# Patient Record
Sex: FEMALE | Race: WHITE | Employment: FULL TIME | URBAN - METROPOLITAN AREA
[De-identification: names, ages, dates, MRNs, and addresses within clinical notes are randomized per-mention and may not be internally consistent; named-entity substitution may affect disease eponyms.]

---

## 2018-07-12 ENCOUNTER — OFFICE VISIT (OUTPATIENT)
Dept: BARIATRICS/WEIGHT MGMT | Age: 38
End: 2018-07-12
Payer: COMMERCIAL

## 2018-07-12 VITALS
WEIGHT: 280 LBS | SYSTOLIC BLOOD PRESSURE: 118 MMHG | BODY MASS INDEX: 46.65 KG/M2 | HEART RATE: 72 BPM | DIASTOLIC BLOOD PRESSURE: 72 MMHG | HEIGHT: 65 IN | RESPIRATION RATE: 20 BRPM

## 2018-07-12 DIAGNOSIS — K21.9 GASTROESOPHAGEAL REFLUX DISEASE WITHOUT ESOPHAGITIS: Primary | ICD-10-CM

## 2018-07-12 DIAGNOSIS — E66.01 MORBID OBESITY (HCC): ICD-10-CM

## 2018-07-12 DIAGNOSIS — R06.83 SNORING: ICD-10-CM

## 2018-07-12 PROCEDURE — 99204 OFFICE O/P NEW MOD 45 MIN: CPT | Performed by: SURGERY

## 2018-07-15 NOTE — PROGRESS NOTES
today.      [] HIV Risk Factors (i.e.) intravenous drug abuser; at risk sexual behavior; received blood products    [] TB Risk Factors (i.e.) Medically underserved, institutional care, foreign born, endemic area; exposure to active case    [] Hepatitis B&C Risk Factors (i.e.) Received blood transfusion prior to 1992; recreational drug use; high risk sexual behaviors; tattoos or body piercings; contact with blood or needle sticks in the workplace    Comprehension    Ability to grasp concepts and respond to questions:   [x] High   [] Medium   [] Low    Motivation    [x] Asks Questions; eager to learn   [] Needs education   [] Extreme anxiety    [] uncooperative   [] Denies need for education    English Speaking Ability    [x] Speaks English well   [x] Reads English well   [x] Understands spoken Letty Anaya    [] Understands written English   [] No need for interpretive support      [] Might benefit from interpretive support   []  required for all services     REVIEW OF SYSTEMS:     Do you feel sleepy during the day? [x] Yes     [] No  Do you get short of breath when walking up two flights of stairs? [x] Yes     [] No  Do you get chest pains when walking up two flights of stairs? [] Yes     [x] No  Do you suffer from back pain? [x] Yes     [] No  Do you suffer from knee pain? [x] Yes     [] No    Do you or have you had any of the following?   Cardiovascular YES NO Respiratory YES NO   High Blood Pressure   []   [x] COPD   []   [x]   Heart Attack   []   [x] TB/Positive skin Test   []   [x]   Congestive Heart Failure   []   [x] Obstructive Sleep Apnea   []   [x]   Coronary Artery Disease   []   [x] Asthma   []   [x]   Circulation Problems   []   [x]      Activity Intolerance   [x]   [] Gastrointestinal YES NO   Peripheral Vascular Disease   []   [x] Gastric Problems   []   [x]        Colorectal problems   []   [x]   Hematological YES NO Ulcer disease   []   [x]   Bleeding Tendencies   [] [x] Liver disease   []   [x]   Blood Transfusion last 30d   []   [x] Gallstones   []   [x]   Anemia   []   [x] Refulx or Heartburn   [x]   []   Blood Clots   []   [x]      High Cholesterol   []   [x] Muscoloskeletal YES NO   High Triglycerides   []   [x] Joint Limitations   []   [x]      Muscle Weakness   []   [x]   Eyes, Ears, Nose, Throat YES NO Multiple Sclerosis   []   [x]   Cataracts   []   [x] Arthritis   []   [x]   Glasses   [x]   []      Blurred Vision   []   [x] Cancer   []   [x]   Hearing Aids   []   [x] Type:     Ringing in Ears   []   [x]      Difficulty Swallowing   []   [x] Encodrine YES NO      Diabetes   []   [x]   Neurological YES NO Thyroid   []   [x]   Stroke   []   [x]      Seizure   []   [x] Psychiatric Disorder YES NO   Dizziness/Blackouts/Fainting   []   [x] Depression   [x]   []   Memory Impairement   []   [x] Bipolar   []   [x]   Parkinson's   []   [x] Anxiety disorder   []   [x]           Genitourinary/Gyn YES NO Skin Intact   [x]   []   Urinary Infection   []   [x]      Stones   []   [x] Sleep   YES NO   Kidney Disease   []   [x] Excessive daytime sleepiness   [x]   []   Incontinent   []   [x] Snoring   [x]   []   Irregular menstrual cycles   [x]   [] Unrefreshed sleep   [x]   []   Possibly Pregnant? []     [x] Other:      Date of LMP:   Preferred location:       PREVIOUS ANESTHESIA  Has any family member had a problem with anesthesia in the past?  [x] No   [] Yes  If yes, describe:  Have you had a problem with anesthesia in the past?                 [x] No   [] Yes  If yes, describe:  Do you have any difficulty moving your head side-to-side? [x] No   [] Yes  Do you have difficulty opening or closing your jaw? [x] No   [] Yes       PRESENT ILLNESS:     Weight Parameters  Weight 280 lb (127 kg)   Height 5' 5\" (1.651 m)   BMI Body mass index is 46.59 kg/m².    IBW     EBW               FALLS ASSESSMENT    [x] LOW RISK FOR FALLS    [] MODERATE RISK FOR FALLS    [] Difficulty walking/selfcare    [] Falls in the past 2 months    [] Suspicion of Clinician    [] Other:      SMOKING CESSATION     [x] Not needed     [] Instructed to stop smoking    [] Pamphlet community resources given     VTE SCREEN    [] Family hx DVT/PE  /   [] Personal hx of DVT/PE    [x] Denies any family or personal hx of DVT/PE    Physician Review    [x] Past medical, family, & social history reviewed and discussed with patient. Review of surgery and post-surgical changes (by surgeon for surgical patients only)    [x] Lifelong diet expectations reviewed with patient    [x] Need for lifelong vitamin supplementation reviewed with patient    PHYSICAL EXAMINATION:      /72 (Site: Left Arm, Position: Sitting, Cuff Size: Large Adult)   Pulse 72   Resp 20   Ht 5' 5\" (1.651 m)   Wt 280 lb (127 kg)   BMI 46.59 kg/m²     Constitutional:  Vital signs are normal. The patient appears well-developed   HEENT:      Head: Normocephalic. Atraumatic     Eyes: pupils are equal and reactive. No scleral icterus is present. Neck: No mass and no thyromegaly present. Cardiovascular: Normal rate, regular rhythm, S1 normal and S2 normal.  Bilateral pulses present. Pulmonary/Chest: Effort normal and breath sounds normal. No retractions. Abdominal: Soft. Normal appearance. There is no organomegaly. No tenderness. There is no rigidity, no rebound, no guarding and no Stein's sign. Musculoskeletal:      Right lower leg: Normal. No tenderness and no edema. Left lower leg: Normal. No tenderness and no edema. Lymphadenopathy:     No cervical adenopathy, No Exrtemity Adenopathy. Neurological: The patient is alert and oriented. Moving all four extremities equally, sensation grossly intact bilateral.  Skin: Skin is warm, dry and intact. Psychiatric: The patient has a normal mood and affect.  Speech is normal and behavior is normal. Judgment and thought content normal. Cognition and memory are normal. RECOMMENDATIONS:     We spent a great deal of time discussing the risks and benefits of Heaven-en-Y Gastric Bypass, including but not limited to injury to intra-abdominal organs, breakdown of the gastric staple line, the need for re-operative therapy,  prolonged hospitalization,  mechanical ventilation,  and death. We discussed the possibility of bleeding, the need for blood transfusions, blood clots, hospital-acquired and intra-abdominal infection, anastomotic stricture, and worsening GERD. And we discussed the need for post-operative visit compliance, behavior modifications and diet changes, protein and vitamin supplementation, as well as routine scheduled and dedicated exercise. I instructed the patient to utilize the exercise log that will be given to them at their fist dietician appointment. We discussed the potential weight loss benefit of approximately 60-70% of her excess body weight at 12-18 months post-op, as well as the possibility of insufficient weight loss or weight gain after 2 years post-operative time. PLAN:       Diagnosis Orders   1. Gastroesophageal reflux disease without esophagitis  PTH, Intact    Magnesium    Lipid Panel    Iron and TIBC    Ferritin    Comprehensive Metabolic Panel    CBC Auto Differential    Hemoglobin A1C    Zinc    Vitamin B12 & Folate    Vitamin D 25 Hydroxy    Vitamin B1    Vitamin A    T4, Free    TSH without Reflex    Urine Drug Screen    Ambulatory referral to Psychiatry    Nicotine, Blood   2. Snoring  Comprehensive Metabolic Panel    CBC Auto Differential    Apnea Link Screening (THIS IS NOT A HOME SLEEP STUDY)   3.  Morbid obesity (HCC)  PTH, Intact    Magnesium    Lipid Panel    Iron and TIBC    Ferritin    Comprehensive Metabolic Panel    CBC Auto Differential    Hemoglobin A1C    Zinc    Vitamin B12 & Folate    Vitamin D 25 Hydroxy    Vitamin B1    Vitamin A    T4, Free    TSH without Reflex    Urine Drug Screen    Ambulatory referral to Psychiatry Nicotine, Blood    Apnea Link Screening (THIS IS NOT A HOME SLEEP STUDY)          Initial Testing     Primary Procedure: Heaven-en-Y Gastric Bypass     Labwork: Initial Pre-surgical Lab Tests (CMP, TSH, Fasting Lipid Profile, Mg, Zinc, Vit B1 (whole blood), Vit B12, 25-OH Vit D, Fe,  Ferritin,  Folate), Urine drug and alcohol screen  and Negative serum nicotine prior to submission for pre-auth    Endoscopic Studies: Upper GI Endoscopy for GERD which has been untreated. Psychological Assessment: Psychological Evaluation and Clearance    Nutrition Assessment: Bariatric Nutrition Assessment and Clearance    Pulmonary Evaluation: Obstructive Sleep Apnea Evaluation, Copy of Previous Sleep Study and CPAP Settings    Other  Consultations: PCP clearance    Physician Supervised Diet and Exercise required by the patients insurance company: 6 months.       Surgical Diet requirement:  2 weeks    Final Testing  Screening Chest Xray  and EKG within 6 months of date of surgery    Labwork:  Final Lab Tests  within 3 months of date of surgery (CBC, PT/PTT, BMP)    Electronically signed by Justice Nevarez DO on 7/15/2018 at 6:43 PM

## 2018-07-26 ENCOUNTER — NURSE ONLY (OUTPATIENT)
Dept: BARIATRICS/WEIGHT MGMT | Age: 38
End: 2018-07-26

## 2018-07-26 NOTE — PROGRESS NOTES
has participated in weight loss medications. Nutrition History  Patient is not lactose intolerant. Have you ever had problems tolerating a multivitamin or mineral supplement? Yes, when she was pregnant   Have you ever been diagnosed with a vitamin or mineral deficiency? no   Patient does not have food allergies. Patient does not have Worship/cultural food concerns. Patient dines out to a sit down restaurant 3-4 times per month. Patient dines out to a fast food restaurant 4 times per year. Patient does not have grazing. Patient occasionally have night eating. Patient does have a history of emotional eating. Patient occasionally  have a history of  eating out of boredom. Patient does not binge on foods  It does not take an unusually large amount of food for the patient to feel comfortably full. Most days the patient eats until female  is comfortable. Drinks throughout the day: regular pop (on occasion), coffee (1 cup), water, unsweetened tea (diet snapple / hot tea) and celi aid   Do you have regular meal times no     24 hour recall/food frequency: has been scanned into chart unless completed below. Surgery  Patient's greatest concern about having surgery is: dying, looking pale and sick. How will you know when you've been successful? Wants to be able to shop for clothes freely  Patient has attended an information session where the long term changes of MBS were presented. Rating on a scale of 0-10 with 0 being none and 10 being the highest you have ever felt rate each of the three areas:    ability How confident are you that you can change now? 10   willingness How important is it for you to change at this time? 10   Readiness How ready are you to change at this time? 10             Assessment:  Nutritional Needs:  Men: 1500kcal daily minimum     Women 1200kcal daily minimum.      60-80gm of protein daily  PES Statement:  Obesity related to a complex combination of decreased energy needs, disordered eating patterns, physical inactivity, and increased psychological/life stress as evidenced by BMI Body mass index is 46.26 kg/m². and inability to maintain a significant amount of weight loss through conventional weight loss interventions. Problem list:  [facility]  Medical Nutrition Therapy   Metabolic and Bariatric Surgery              []     Sugary non carbonated  beverages        []     Dining out        []     No activity plan        []     Supportive therapy indicated         []    Diet   Carbonated beverages       []      Sugary carbonated beverages        [x]    Erratic meal times        []   Excessive sugar intake        []       alcohol        []        smoking      []  Not attending appt. On time and as scheduled      [x]  Caffeine intake (coffee or tea)  Coffee and tea   Initial assessment date 7-12-18    Wt 280lb       Goals    All goals were planned with and agreed on by the patient. Goal Card  Name                                                                                                                           Humphrey Hamman Schober  1. I will read the entire patient educational binder provided to me prior to my second appointment at [facility]. 2. I will make my psychological evaluation appointment prior to my second appointment at [facility]. 3. I will bring this tracking tool to every appointment with a health care provider at [facility]. 4. I will eliminate all nicotine, tobacco and e-cigarettes prior to surgery. 5. I will limit alcoholic beverages prior to surgery to 1 mixed drink or glass of wine (4-6oz). 6. I will limit dining out including fast food to 3 times a week prior to surgery. 7. I will eliminate sugary beverages prior to surgery. 8. I will eliminate carbonated beverages prior to surgery. 9. I will eliminate drinking with a straw prior to surgery. 10. I will limit caffeinated beverages prior to my surgery to [address] daily. 11. I will eliminate cold cereals prepared with milk prior to surgery. 12. I will do a 5 minute reflection    13. I will food journal daily (If I dont find this helpful after one month I may discontinue the behavior with the understanding that it will be important to my health to do this for the first three months following surgery). 14. I will exercise daily for 10-30  minutes daily 24 days per month or more as tolerated. I will keep a daily log of my physical activity each day. 15. I will determine my optimal supplement plan. 16. I will purchase my supplements. 17. I will begin taking supplements according to my plan. 18. I will eat 8-11 servings of lean protein daily following the guidelines for meal planning in the patient educational binder provided to me. 19. I will eat every 3-5 hours for all meals for one day each week on a day of my choosing. 20. I will maintain my fluid intake of at least 64oz daily. 21. I will follow the 15/30/15 rule at least one day each week for all meals I am allowed to have a small 4oz beverage as needed at meal times. ( 15-30-15-do not drink 15minutes prior to a meal, take 30minutes to eat your meal and dont drink 15 minutes after your meal)    22. I will eat around my plate at all meals at least one day each week on a day of my choosing. Please write down the greatest motivator that brought you to us today  I want to manage my weight because                                appt # na oa What is your next step?   G# 1 2 3 4 5 6 7 8 9   0    1            0    2            0    3             x   4                5             x   6                7                8                9                10             x   11                12                13            0    14                15                16                17 18                19                20                21                22                23                24                 25                    Do you understand your goals? y    Do you have the information you need to achieve your goals? y    Do you have any questions  right now? n        Plan    Exercise for Health 15 easy exercises to do at home with 6 activity logs were provided to the patient with verbal and written instructions on how to carry this out. Goal number 14 was provided to the patient on this visit please see above. Will follow up each month and provide support as patient begins to add physical activity to life style. Monitor and review goals adjust as needed. Follow up monthly supervised diet and exercise.        Ricardo Rivera

## 2018-07-27 VITALS — WEIGHT: 278 LBS | BODY MASS INDEX: 46.26 KG/M2

## 2018-08-20 ENCOUNTER — OFFICE VISIT (OUTPATIENT)
Dept: BARIATRICS/WEIGHT MGMT | Age: 38
End: 2018-08-20
Payer: COMMERCIAL

## 2018-08-20 VITALS
HEIGHT: 65 IN | RESPIRATION RATE: 22 BRPM | WEIGHT: 281 LBS | HEART RATE: 68 BPM | SYSTOLIC BLOOD PRESSURE: 128 MMHG | BODY MASS INDEX: 46.82 KG/M2 | DIASTOLIC BLOOD PRESSURE: 80 MMHG

## 2018-08-20 DIAGNOSIS — G89.29 CHRONIC BACK PAIN, UNSPECIFIED BACK LOCATION, UNSPECIFIED BACK PAIN LATERALITY: ICD-10-CM

## 2018-08-20 DIAGNOSIS — Z86.59 HISTORY OF DEPRESSION: ICD-10-CM

## 2018-08-20 DIAGNOSIS — K21.9 GASTROESOPHAGEAL REFLUX DISEASE WITHOUT ESOPHAGITIS: ICD-10-CM

## 2018-08-20 DIAGNOSIS — M54.9 CHRONIC BACK PAIN, UNSPECIFIED BACK LOCATION, UNSPECIFIED BACK PAIN LATERALITY: ICD-10-CM

## 2018-08-20 DIAGNOSIS — E66.01 OBESITY, CLASS III, BMI 40-49.9 (MORBID OBESITY) (HCC): ICD-10-CM

## 2018-08-20 PROBLEM — E66.813 OBESITY, CLASS III, BMI 40-49.9 (MORBID OBESITY) (HCC): Status: ACTIVE | Noted: 2018-08-20

## 2018-08-20 PROCEDURE — 99213 OFFICE O/P EST LOW 20 MIN: CPT | Performed by: NURSE PRACTITIONER

## 2018-08-20 NOTE — PROGRESS NOTES
Medical Weight Management Progress Note    Subjective     Patient being seen for medically supervised weight loss for the chronic conditions of GERD, Chronic Back Pain, Hx Depression. She is working on the behavior changes discussed at the initial appointment. Patient continues on diet plan. Physical activity includes yoga, biking, and swimming. Weight gain of 1 lb since last visit. Needs to schedule/complete medical testing. No current issues. Working toward bariatric surgery:    [] Sleeve Gastrectomy                                                           [x] Heaven-en-Y Gastric Bypass    Allergies:  No Known Allergies    Past Medical History:     Past Medical History:   Diagnosis Date    Back pain     Depression     Migraines    . Past Surgical History:  Past Surgical History:   Procedure Laterality Date    PARTIAL HYSTERECTOMY         Family History:  Family History   Problem Relation Age of Onset    Arthritis Mother     Depression Mother     Heart Disease Father        Social History:  Social History     Social History    Marital status:      Spouse name: N/A    Number of children: N/A    Years of education: N/A     Occupational History    Not on file. Social History Main Topics    Smoking status: Never Smoker    Smokeless tobacco: Never Used    Alcohol use Yes    Drug use: Yes     Types: Marijuana      Comment: use marijuana sometimes     Sexual activity: Not on file     Other Topics Concern    Not on file     Social History Narrative    No narrative on file       Current Medications:  No current outpatient prescriptions on file. No current facility-administered medications for this visit. Vital Signs:  /80 (Site: Right Arm, Position: Sitting, Cuff Size: Large Adult)   Pulse 68   Resp 22   Ht 5' 5\" (1.651 m)   Wt 281 lb (127.5 kg)   BMI 46.76 kg/m²     BMI/Height/Weight:  Body mass index is 46.76 kg/m².       Review of Systems - A review of

## 2018-08-20 NOTE — PROGRESS NOTES
Medical Nutrition Therapy   Metabolic and Bariatric Surgery         Supervised diet and exercise preparation  Visit 1 out of 6  Pt reports:      Pt currently following structured meal plan 8pro/3veg/2fr/6 starch/3fat from education binder diet for weight management. Reviewed with pt. Vitals: Wt Readings from Last 3 Encounters:   08/20/18 281 lb (127.5 kg)   07/27/18 278 lb (126.1 kg)   07/12/18 280 lb (127 kg)     gained 1 lbs over one month      Nutrition Assessment:   PES: Knowledge deficit related to healthy behaviors that support weight management post weight loss surgery as evidenced by Body mass index is 46.76 kg/m². Nutrition Assessment of Goal Attainment:  TREATMENT GOALS:    1. Pt  Completed 4 out of 4 goals. 2.TREATMENT GOALS FOR UPCOMING WEEK: continue all previous goals and add: # 23    All goals were planned with and agreed on by the patient. Goal Card  Name                                                                                                                           Arlen Benne Schober  1. I will read the entire patient educational binder provided to me prior to my second appointment at [facility]. 2. I will make my psychological evaluation appointment prior to my second appointment at [facility]. 3. I will bring this tracking tool to every appointment with a health care provider at [facility]. 4. I will eliminate all nicotine, tobacco and e-cigarettes prior to surgery. 5. I will limit alcoholic beverages prior to surgery to 1 mixed drink or glass of wine (4-6oz). 6. I will limit dining out including fast food to 3 times a week prior to surgery. 7. I will eliminate sugary beverages prior to surgery. 8. I will eliminate carbonated beverages prior to surgery. 9. I will eliminate drinking with a straw prior to surgery. 10. I will limit caffeinated beverages prior to my surgery to [address] daily. 11. I will eliminate cold cereals prepared with milk prior to surgery. 12. I will do a 5 minute reflection    13. I will food journal daily (If I dont find this helpful after one month I may discontinue the behavior with the understanding that it will be important to my health to do this for the first three months following surgery). 14. I will exercise daily for 10-30  minutes daily 24 days per month or more as tolerated. I will keep a daily log of my physical activity each day. 15. I will determine my optimal supplement plan. 16. I will purchase my supplements. 17. I will begin taking supplements according to my plan. 18. I will eat 8-11 servings of lean protein daily following the guidelines for meal planning in the patient educational binder provided to me. 19. I will eat every 3-5 hours for all meals for one day each week on a day of my choosing. 20. I will maintain my fluid intake of at least 64oz daily. 21. I will follow the 15/30/15 rule at least one day each week for all meals I am allowed to have a small 4oz beverage as needed at meal times. ( 15-30-15-do not drink 15minutes prior to a meal, take 30minutes to eat your meal and dont drink 15 minutes after your meal)    22. I will eat around my plate at all meals at least one day each week on a day of my choosing. Please write down the greatest motivator that brought you to us today  I want to manage my weight because                                appt # na oa What is your next step?   G# 1 2 3 4 5 6 7 8 9   0  x  1 75           0  x  2 100           1    3 100            x   4             x   5             x   6             x   7             x   8                9             x   10             x   11                12                13            1    14 100               15                16                17                18                19                20

## 2018-08-27 DIAGNOSIS — R06.83 SNORING: ICD-10-CM

## 2018-08-27 DIAGNOSIS — G47.33 OSA (OBSTRUCTIVE SLEEP APNEA): Primary | ICD-10-CM

## 2018-08-27 DIAGNOSIS — E66.01 MORBID OBESITY (HCC): ICD-10-CM

## 2018-09-10 ENCOUNTER — OFFICE VISIT (OUTPATIENT)
Dept: BARIATRICS/WEIGHT MGMT | Age: 38
End: 2018-09-10
Payer: COMMERCIAL

## 2018-09-10 ENCOUNTER — NURSE ONLY (OUTPATIENT)
Dept: BARIATRICS/WEIGHT MGMT | Age: 38
End: 2018-09-10

## 2018-09-10 VITALS
BODY MASS INDEX: 47.15 KG/M2 | SYSTOLIC BLOOD PRESSURE: 120 MMHG | RESPIRATION RATE: 22 BRPM | DIASTOLIC BLOOD PRESSURE: 68 MMHG | WEIGHT: 283 LBS | HEART RATE: 74 BPM | HEIGHT: 65 IN

## 2018-09-10 DIAGNOSIS — G89.29 CHRONIC BACK PAIN, UNSPECIFIED BACK LOCATION, UNSPECIFIED BACK PAIN LATERALITY: ICD-10-CM

## 2018-09-10 DIAGNOSIS — E66.01 OBESITY, CLASS III, BMI 40-49.9 (MORBID OBESITY) (HCC): ICD-10-CM

## 2018-09-10 DIAGNOSIS — K21.9 GASTROESOPHAGEAL REFLUX DISEASE WITHOUT ESOPHAGITIS: Primary | ICD-10-CM

## 2018-09-10 DIAGNOSIS — Z86.59 HISTORY OF DEPRESSION: ICD-10-CM

## 2018-09-10 DIAGNOSIS — M54.9 CHRONIC BACK PAIN, UNSPECIFIED BACK LOCATION, UNSPECIFIED BACK PAIN LATERALITY: ICD-10-CM

## 2018-09-10 PROCEDURE — 99213 OFFICE O/P EST LOW 20 MIN: CPT | Performed by: NURSE PRACTITIONER

## 2018-09-10 RX ORDER — MELOXICAM 15 MG/1
TABLET ORAL
Status: ON HOLD | COMMUNITY
Start: 2012-05-31 | End: 2019-04-16 | Stop reason: HOSPADM

## 2018-09-10 RX ORDER — CYCLOBENZAPRINE HCL 10 MG
TABLET ORAL
COMMUNITY
Start: 2017-07-18 | End: 2019-04-22 | Stop reason: SDUPTHER

## 2018-09-10 RX ORDER — ALPRAZOLAM 0.5 MG/1
TABLET ORAL
COMMUNITY
Start: 2015-07-15 | End: 2020-06-05

## 2018-09-10 RX ORDER — TIZANIDINE HYDROCHLORIDE 4 MG/1
CAPSULE, GELATIN COATED ORAL
COMMUNITY
Start: 2015-12-09 | End: 2018-12-03

## 2018-09-10 NOTE — PROGRESS NOTES
12. I will do a 5 minute reflection    13. I will food journal daily (If I dont find this helpful after one month I may discontinue the behavior with the understanding that it will be important to my health to do this for the first three months following surgery). 14. I will exercise daily for 10-30  minutes daily 24 days per month or more as tolerated. I will keep a daily log of my physical activity each day. 15. I will determine my optimal supplement plan. 16. I will purchase my supplements. 17. I will begin taking supplements according to my plan. 18. I will eat 8-11 servings of lean protein daily following the guidelines for meal planning in the patient educational binder provided to me. 19. I will eat every 3-5 hours for all meals for one day each week on a day of my choosing. 20. I will maintain my fluid intake of at least 64oz daily. 21. I will follow the 15/30/15 rule at least one day each week for all meals I am allowed to have a small 4oz beverage as needed at meal times. ( 15-30-15-do not drink 15minutes prior to a meal, take 30minutes to eat your meal and dont drink 15 minutes after your meal)    22. I will eat around my plate at all meals at least one day each week on a day of my choosing. Please write down the greatest motivator that brought you to us today  I want to manage my weight because                                appt # na oa What is your next step?   G# 1 2 3 4 5 6 7 8 9   0  x  1 75 100          0  x  2 100           2    3 100 100           x   4             x   5             x   6             x   7             x   8                9             x   10             x   11                12                13            2    14 100 100          2    15                16                17                18                19                20

## 2018-09-10 NOTE — PROGRESS NOTES
medications for this visit. Vital Signs:  /68 (Site: Right Upper Arm, Position: Sitting, Cuff Size: Large Adult)   Pulse 74   Resp 22   Ht 5' 5\" (1.651 m)   Wt 283 lb (128.4 kg)   BMI 47.09 kg/m²     BMI/Height/Weight:  Body mass index is 47.09 kg/m². Review of Systems - A review of systems was performed. All was negative unless otherwise documented in HPI. Constitutional: Negative for fever, chills and diaphoresis. HENT: Negative for hearing loss and trouble swallowing. Eyes: Negative for photophobia and visual disturbance. Respiratory: Negative for cough, shortness of breath and wheezing. Cardiovascular: Negative for chest pain and palpitations. Gastrointestinal: Negative for nausea, vomiting, abdominal pain, diarrhea, constipation, blood in stool and abdominal distention. Endocrine: Negative for polydipsia, polyphagia and polyuria. Genitourinary: Negative for dysuria, frequency, hematuria and difficulty urinating. Musculoskeletal: Negative for myalgias, joint swelling. Skin: Negative for pallor and rash. Neurological: Negative for dizziness, tremors, light-headedness and headaches. Psychiatric/Behavioral: Negative for sleep disturbance and dysphoric mood. Objective:      Physical Exam   Vital signs reviewed. General: Well-developed and well-nourished. No acute distress. Skin: Warm, dry and intact. HEENT: Normocephalic. EOMs intact. Conjunctivae normal. Neck supple. Cardiovascular: Normal rate, regular rhythm. No murmur. Pulmonary/Chest: Normal effort. Lungs clear to auscultation. No rales, rhonchi or wheezing. Abdominal: Positive bowel sounds. Soft, nontender. Nondistended. Musculoskeletal: Movement x4. No edema. Neurological: Gait normal. Alert and oriented to person, place, and time. Psychiatric: Normal mood and affect. Speech and behavior normal. Judgment and thought content normal. Cognition and memory intact.      Assessment:       Diagnosis

## 2018-10-08 ENCOUNTER — OFFICE VISIT (OUTPATIENT)
Dept: BARIATRICS/WEIGHT MGMT | Age: 38
End: 2018-10-08
Payer: COMMERCIAL

## 2018-10-08 VITALS
WEIGHT: 285 LBS | RESPIRATION RATE: 20 BRPM | HEART RATE: 68 BPM | BODY MASS INDEX: 47.48 KG/M2 | SYSTOLIC BLOOD PRESSURE: 122 MMHG | DIASTOLIC BLOOD PRESSURE: 68 MMHG | HEIGHT: 65 IN

## 2018-10-08 DIAGNOSIS — K21.9 GASTROESOPHAGEAL REFLUX DISEASE WITHOUT ESOPHAGITIS: Primary | ICD-10-CM

## 2018-10-08 DIAGNOSIS — M54.9 CHRONIC BACK PAIN, UNSPECIFIED BACK LOCATION, UNSPECIFIED BACK PAIN LATERALITY: ICD-10-CM

## 2018-10-08 DIAGNOSIS — G89.29 CHRONIC BACK PAIN, UNSPECIFIED BACK LOCATION, UNSPECIFIED BACK PAIN LATERALITY: ICD-10-CM

## 2018-10-08 DIAGNOSIS — Z86.59 HISTORY OF DEPRESSION: ICD-10-CM

## 2018-10-08 DIAGNOSIS — E66.01 OBESITY, CLASS III, BMI 40-49.9 (MORBID OBESITY) (HCC): ICD-10-CM

## 2018-10-08 PROCEDURE — 99213 OFFICE O/P EST LOW 20 MIN: CPT | Performed by: NURSE PRACTITIONER

## 2018-10-15 ENCOUNTER — HOSPITAL ENCOUNTER (OUTPATIENT)
Dept: SLEEP CENTER | Facility: CLINIC | Age: 38
Discharge: HOME | End: 2018-10-17
Payer: COMMERCIAL

## 2018-10-15 DIAGNOSIS — G47.33 OSA (OBSTRUCTIVE SLEEP APNEA): ICD-10-CM

## 2018-10-15 PROCEDURE — 95810 POLYSOM 6/> YRS 4/> PARAM: CPT

## 2018-10-16 VITALS — RESPIRATION RATE: 20 BRPM | HEIGHT: 65 IN | HEART RATE: 75 BPM | BODY MASS INDEX: 46.65 KG/M2 | WEIGHT: 280 LBS

## 2018-10-16 ASSESSMENT — SLEEP AND FATIGUE QUESTIONNAIRES
HOW LIKELY ARE YOU TO NOD OFF OR FALL ASLEEP WHILE SITTING INACTIVE IN A PUBLIC PLACE: 0
HOW LIKELY ARE YOU TO NOD OFF OR FALL ASLEEP WHILE SITTING AND TALKING TO SOMEONE: 0
HOW LIKELY ARE YOU TO NOD OFF OR FALL ASLEEP WHEN YOU ARE A PASSENGER IN A CAR FOR AN HOUR WITHOUT A BREAK: 2
HOW LIKELY ARE YOU TO NOD OFF OR FALL ASLEEP WHILE WATCHING TV: 1
ESS TOTAL SCORE: 5
HOW LIKELY ARE YOU TO NOD OFF OR FALL ASLEEP IN A CAR, WHILE STOPPED FOR A FEW MINUTES IN TRAFFIC: 0
HOW LIKELY ARE YOU TO NOD OFF OR FALL ASLEEP WHILE SITTING QUIETLY AFTER LUNCH WITHOUT ALCOHOL: 0
NECK CIRCUMFERENCE (INCHES): 43
HOW LIKELY ARE YOU TO NOD OFF OR FALL ASLEEP WHILE SITTING AND READING: 1
HOW LIKELY ARE YOU TO NOD OFF OR FALL ASLEEP WHILE LYING DOWN TO REST IN THE AFTERNOON WHEN CIRCUMSTANCES PERMIT: 1

## 2018-10-23 LAB — STATUS: NORMAL

## 2018-11-12 ENCOUNTER — HOSPITAL ENCOUNTER (OUTPATIENT)
Dept: SLEEP CENTER | Facility: CLINIC | Age: 38
Discharge: HOME | End: 2018-11-14
Payer: COMMERCIAL

## 2018-11-12 DIAGNOSIS — G47.33 OSA (OBSTRUCTIVE SLEEP APNEA): ICD-10-CM

## 2018-11-12 PROCEDURE — 95811 POLYSOM 6/>YRS CPAP 4/> PARM: CPT

## 2018-11-13 VITALS
HEART RATE: 77 BPM | OXYGEN SATURATION: 97 % | RESPIRATION RATE: 11 BRPM | WEIGHT: 280 LBS | BODY MASS INDEX: 46.65 KG/M2 | HEIGHT: 65 IN

## 2018-11-15 LAB — STATUS: NORMAL

## 2018-11-16 ENCOUNTER — OFFICE VISIT (OUTPATIENT)
Dept: BARIATRICS/WEIGHT MGMT | Age: 38
End: 2018-11-16
Payer: COMMERCIAL

## 2018-11-16 VITALS
SYSTOLIC BLOOD PRESSURE: 122 MMHG | RESPIRATION RATE: 22 BRPM | BODY MASS INDEX: 47.48 KG/M2 | HEART RATE: 78 BPM | DIASTOLIC BLOOD PRESSURE: 82 MMHG | WEIGHT: 285 LBS | HEIGHT: 65 IN

## 2018-11-16 DIAGNOSIS — G47.33 OSA (OBSTRUCTIVE SLEEP APNEA): Primary | ICD-10-CM

## 2018-11-16 DIAGNOSIS — Z86.59 HISTORY OF DEPRESSION: ICD-10-CM

## 2018-11-16 DIAGNOSIS — G89.29 CHRONIC BACK PAIN, UNSPECIFIED BACK LOCATION, UNSPECIFIED BACK PAIN LATERALITY: ICD-10-CM

## 2018-11-16 DIAGNOSIS — G47.33 OSA (OBSTRUCTIVE SLEEP APNEA): ICD-10-CM

## 2018-11-16 DIAGNOSIS — K21.9 GASTROESOPHAGEAL REFLUX DISEASE WITHOUT ESOPHAGITIS: Primary | ICD-10-CM

## 2018-11-16 DIAGNOSIS — M54.9 CHRONIC BACK PAIN, UNSPECIFIED BACK LOCATION, UNSPECIFIED BACK PAIN LATERALITY: ICD-10-CM

## 2018-11-16 DIAGNOSIS — E66.01 OBESITY, CLASS III, BMI 40-49.9 (MORBID OBESITY) (HCC): ICD-10-CM

## 2018-11-16 PROCEDURE — 99213 OFFICE O/P EST LOW 20 MIN: CPT | Performed by: NURSE PRACTITIONER

## 2018-11-16 NOTE — PROGRESS NOTES
Medical Nutrition Therapy   Metabolic and Bariatric Surgery         Supervised diet and exercise preparation  Visit 4 out of 6  Pt reports:      Pt currently following structured meal plan low carbohydrate diet for weight management. Reviewed with pt. Vitals: Wt Readings from Last 3 Encounters:   11/16/18 285 lb (129.3 kg)   11/13/18 280 lb (127 kg)   10/16/18 280 lb (127 kg)     stable / unchanged      Nutrition Assessment:   PES: Knowledge deficit related to healthy behaviors that support weight management post weight loss surgery as evidenced by Body mass index is 47.43 kg/m². Nutrition Assessment of Goal Attainment:  TREATMENT GOALS:    1. Pt  Completed 3 out of 3 goals. 2.TREATMENT GOALS FOR UPCOMING WEEK: continue all previous goals and add: # 19, 22     All goals were planned with and agreed on by the patient. Goal Card  Name                                                                                                                           Stephan Bohr Schober  1. I will read the entire patient educational binder provided to me prior to my second appointment at [facility]. 2. I will make my psychological evaluation appointment prior to my second appointment at [facility]. 3. I will bring this tracking tool to every appointment with a health care provider at [facility]. 4. I will eliminate all nicotine, tobacco and e-cigarettes prior to surgery. 5. I will limit alcoholic beverages prior to surgery to 1 mixed drink or glass of wine (4-6oz). 6. I will limit dining out including fast food to 3 times a week prior to surgery. 7. I will eliminate sugary beverages prior to surgery. 8. I will eliminate carbonated beverages prior to surgery. 9. I will eliminate drinking with a straw prior to surgery. 10. I will limit caffeinated beverages prior to my surgery to [address] daily. 11. I will eliminate cold cereals prepared with milk prior to surgery.     12. I will do a 5 minute reflection
tablet as needed       No current facility-administered medications for this visit. Vital Signs:  /82 (Site: Right Upper Arm, Position: Sitting, Cuff Size: Large Adult)   Pulse 78   Resp 22   Ht 5' 5\" (1.651 m)   Wt 285 lb (129.3 kg)   BMI 47.43 kg/m²     BMI/Height/Weight:  Body mass index is 47.43 kg/m². Review of Systems - A review of systems was performed. All was negative unless otherwise documented in HPI. Constitutional: Negative for fever, chills and diaphoresis. HENT: Negative for hearing loss and trouble swallowing. Eyes: Negative for photophobia and visual disturbance. Respiratory: Negative for cough, shortness of breath and wheezing. Cardiovascular: Negative for chest pain and palpitations. Gastrointestinal: Negative for nausea, vomiting, abdominal pain, diarrhea, constipation, blood in stool and abdominal distention. Endocrine: Negative for polydipsia, polyphagia and polyuria. Genitourinary: Negative for dysuria, frequency, hematuria and difficulty urinating. Musculoskeletal: Negative for myalgias, joint swelling. Skin: Negative for pallor and rash. Neurological: Negative for dizziness, tremors, light-headedness and headaches. Psychiatric/Behavioral: Negative for sleep disturbance and dysphoric mood. Objective:      Physical Exam   Vital signs reviewed. General: Well-developed and well-nourished. No acute distress. Skin: Warm, dry and intact. HEENT: Normocephalic. EOMs intact. Conjunctivae normal. Neck supple. Cardiovascular: Normal rate, regular rhythm. No murmur. Pulmonary/Chest: Normal effort. Lungs clear to auscultation. No rales, rhonchi or wheezing. Abdominal: Positive bowel sounds. Soft, nontender. Nondistended. Musculoskeletal: Movement x4. No edema. Neurological: Gait normal. Alert and oriented to person, place, and time. Psychiatric: Normal mood and affect.  Speech and behavior normal. Judgment and thought content normal.

## 2018-11-23 ENCOUNTER — ANESTHESIA EVENT (OUTPATIENT)
Dept: ENDOSCOPY | Age: 38
End: 2018-11-23
Payer: COMMERCIAL

## 2018-11-23 ENCOUNTER — HOSPITAL ENCOUNTER (OUTPATIENT)
Age: 38
Setting detail: OUTPATIENT SURGERY
Discharge: HOME | End: 2018-11-23
Attending: SURGERY | Admitting: SURGERY
Payer: COMMERCIAL

## 2018-11-23 ENCOUNTER — ANESTHESIA (OUTPATIENT)
Dept: ENDOSCOPY | Age: 38
End: 2018-11-23
Payer: COMMERCIAL

## 2018-11-23 VITALS
TEMPERATURE: 97.7 F | DIASTOLIC BLOOD PRESSURE: 82 MMHG | OXYGEN SATURATION: 96 % | HEART RATE: 75 BPM | RESPIRATION RATE: 17 BRPM | SYSTOLIC BLOOD PRESSURE: 121 MMHG

## 2018-11-23 VITALS
SYSTOLIC BLOOD PRESSURE: 104 MMHG | OXYGEN SATURATION: 93 % | DIASTOLIC BLOOD PRESSURE: 54 MMHG | RESPIRATION RATE: 17 BRPM

## 2018-11-23 PROCEDURE — 7100000010 HC PHASE II RECOVERY - FIRST 15 MIN: Performed by: SURGERY

## 2018-11-23 PROCEDURE — 6360000002 HC RX W HCPCS: Performed by: NURSE ANESTHETIST, CERTIFIED REGISTERED

## 2018-11-23 PROCEDURE — 3700000001 HC ADD 15 MINUTES (ANESTHESIA): Performed by: SURGERY

## 2018-11-23 PROCEDURE — 87077 CULTURE AEROBIC IDENTIFY: CPT

## 2018-11-23 PROCEDURE — 2709999900 HC NON-CHARGEABLE SUPPLY: Performed by: SURGERY

## 2018-11-23 PROCEDURE — 7100000011 HC PHASE II RECOVERY - ADDTL 15 MIN: Performed by: SURGERY

## 2018-11-23 PROCEDURE — 3609012400 HC EGD TRANSORAL BIOPSY SINGLE/MULTIPLE: Performed by: SURGERY

## 2018-11-23 PROCEDURE — 2500000003 HC RX 250 WO HCPCS: Performed by: NURSE ANESTHETIST, CERTIFIED REGISTERED

## 2018-11-23 PROCEDURE — 3700000000 HC ANESTHESIA ATTENDED CARE: Performed by: SURGERY

## 2018-11-23 PROCEDURE — 43239 EGD BIOPSY SINGLE/MULTIPLE: CPT | Performed by: SURGERY

## 2018-11-23 PROCEDURE — 88305 TISSUE EXAM BY PATHOLOGIST: CPT

## 2018-11-23 PROCEDURE — 2580000003 HC RX 258: Performed by: SURGERY

## 2018-11-23 RX ORDER — SODIUM CHLORIDE 9 MG/ML
INJECTION, SOLUTION INTRAVENOUS CONTINUOUS
Status: DISCONTINUED | OUTPATIENT
Start: 2018-11-23 | End: 2018-11-23 | Stop reason: HOSPADM

## 2018-11-23 RX ORDER — OMEPRAZOLE 20 MG/1
20 CAPSULE, DELAYED RELEASE ORAL
Qty: 90 CAPSULE | Refills: 1 | Status: SHIPPED | OUTPATIENT
Start: 2018-11-23 | End: 2018-12-03

## 2018-11-23 RX ORDER — PROPOFOL 10 MG/ML
INJECTION, EMULSION INTRAVENOUS PRN
Status: DISCONTINUED | OUTPATIENT
Start: 2018-11-23 | End: 2018-11-23 | Stop reason: SDUPTHER

## 2018-11-23 RX ORDER — OMEPRAZOLE 20 MG/1
20 CAPSULE, DELAYED RELEASE ORAL
Qty: 90 CAPSULE | Refills: 1 | Status: SHIPPED | OUTPATIENT
Start: 2018-11-23 | End: 2018-11-23

## 2018-11-23 RX ORDER — LIDOCAINE HYDROCHLORIDE 10 MG/ML
INJECTION, SOLUTION EPIDURAL; INFILTRATION; INTRACAUDAL; PERINEURAL PRN
Status: DISCONTINUED | OUTPATIENT
Start: 2018-11-23 | End: 2018-11-23 | Stop reason: SDUPTHER

## 2018-11-23 RX ORDER — ONDANSETRON 2 MG/ML
INJECTION INTRAMUSCULAR; INTRAVENOUS PRN
Status: DISCONTINUED | OUTPATIENT
Start: 2018-11-23 | End: 2018-11-23 | Stop reason: SDUPTHER

## 2018-11-23 RX ADMIN — ONDANSETRON 4 MG: 2 INJECTION INTRAMUSCULAR; INTRAVENOUS at 10:24

## 2018-11-23 RX ADMIN — SODIUM CHLORIDE: 9 INJECTION, SOLUTION INTRAVENOUS at 09:22

## 2018-11-23 RX ADMIN — PROPOFOL 150 MG: 10 INJECTION, EMULSION INTRAVENOUS at 10:09

## 2018-11-23 RX ADMIN — LIDOCAINE HYDROCHLORIDE 50 MG: 10 INJECTION, SOLUTION EPIDURAL; INFILTRATION; INTRACAUDAL at 10:09

## 2018-11-23 RX ADMIN — PROPOFOL 350 MG: 10 INJECTION, EMULSION INTRAVENOUS at 10:10

## 2018-11-23 ASSESSMENT — PAIN SCALES - WONG BAKER: WONGBAKER_NUMERICALRESPONSE: 0

## 2018-11-23 ASSESSMENT — LIFESTYLE VARIABLES: SMOKING_STATUS: 0

## 2018-11-23 ASSESSMENT — ENCOUNTER SYMPTOMS: SHORTNESS OF BREATH: 0

## 2018-11-23 ASSESSMENT — PAIN SCALES - GENERAL
PAINLEVEL_OUTOF10: 0
PAINLEVEL_OUTOF10: 0

## 2018-11-23 NOTE — H&P
is no rigidity, no rebound, no guarding and no Stein's sign. Musculoskeletal:        Right lower leg: Normal. No tenderness and no edema. Left lower leg: Normal. No tenderness and no edema. Lymphadenopathy:     No cervical adenopathy, No Exrtemity Adenopathy. Neurological: The patient is alert and oriented. Skin: Skin is warm, dry and intact. Psychiatric: The patient has a normal mood and affect.  Speech is normal and behavior is normal. Judgment and thought content normal. Cognition and memory are normal.     Assessment     Patient Active Problem List   Diagnosis    Gastroesophageal reflux disease without esophagitis    History of depression    Obesity, Class III, BMI 40-49.9 (morbid obesity) ([facility] [address]    Chronic back pain    CURLY (obstructive sleep apnea)       Plan   EGD    [facility],   11/23/18

## 2018-11-24 LAB
DIRECT EXAM: NEGATIVE
Lab: NORMAL
SPECIMEN DESCRIPTION: NORMAL
STATUS: NORMAL

## 2018-11-26 LAB — SURGICAL PATHOLOGY REPORT: NORMAL

## 2018-12-03 ENCOUNTER — OFFICE VISIT (OUTPATIENT)
Dept: PULMONOLOGY | Age: 38
End: 2018-12-03
Payer: COMMERCIAL

## 2018-12-03 ENCOUNTER — APPOINTMENT (OUTPATIENT)
Dept: GENERAL RADIOLOGY | Age: 38
End: 2018-12-03
Payer: COMMERCIAL

## 2018-12-03 ENCOUNTER — HOSPITAL ENCOUNTER (EMERGENCY)
Age: 38
Discharge: HOME | End: 2018-12-03
Attending: EMERGENCY MEDICINE
Payer: COMMERCIAL

## 2018-12-03 VITALS
BODY MASS INDEX: 48.32 KG/M2 | HEIGHT: 65 IN | SYSTOLIC BLOOD PRESSURE: 151 MMHG | OXYGEN SATURATION: 96 % | TEMPERATURE: 98.4 F | DIASTOLIC BLOOD PRESSURE: 86 MMHG | RESPIRATION RATE: 16 BRPM | HEART RATE: 78 BPM | WEIGHT: 290 LBS

## 2018-12-03 VITALS
HEART RATE: 70 BPM | WEIGHT: 292.2 LBS | SYSTOLIC BLOOD PRESSURE: 116 MMHG | DIASTOLIC BLOOD PRESSURE: 78 MMHG | HEIGHT: 65 IN | OXYGEN SATURATION: 97 % | TEMPERATURE: 97.8 F | BODY MASS INDEX: 48.68 KG/M2 | RESPIRATION RATE: 14 BRPM

## 2018-12-03 DIAGNOSIS — S20.211A RIB CONTUSION, RIGHT, INITIAL ENCOUNTER: ICD-10-CM

## 2018-12-03 DIAGNOSIS — E66.01 MORBID OBESITY (HCC): ICD-10-CM

## 2018-12-03 DIAGNOSIS — G47.33 OSA (OBSTRUCTIVE SLEEP APNEA): Primary | ICD-10-CM

## 2018-12-03 DIAGNOSIS — M25.511 ACUTE PAIN OF RIGHT SHOULDER: ICD-10-CM

## 2018-12-03 DIAGNOSIS — S70.11XA CONTUSION OF RIGHT THIGH, INITIAL ENCOUNTER: ICD-10-CM
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
## 2018-12-03 PROCEDURE — 99204 OFFICE O/P NEW MOD 45 MIN: CPT | Performed by: INTERNAL MEDICINE

## 2018-12-03 PROCEDURE — 6360000002 HC RX W HCPCS: Performed by: PHYSICIAN ASSISTANT

## 2018-12-03 PROCEDURE — 99284 EMERGENCY DEPT VISIT MOD MDM: CPT

## 2018-12-03 PROCEDURE — 73030 X-RAY EXAM OF SHOULDER: CPT

## 2018-12-03 PROCEDURE — 96372 THER/PROPH/DIAG INJ SC/IM: CPT

## 2018-12-03 PROCEDURE — 71101 X-RAY EXAM UNILAT RIBS/CHEST: CPT

## 2018-12-03 RX ORDER — KETOROLAC TROMETHAMINE 30 MG/ML
30 INJECTION, SOLUTION INTRAMUSCULAR; INTRAVENOUS ONCE
Status: COMPLETED | OUTPATIENT
Start: 2018-12-03 | End: 2018-12-03

## 2018-12-03 RX ADMIN — KETOROLAC TROMETHAMINE 30 MG: 30 INJECTION, SOLUTION INTRAMUSCULAR at 19:15

## 2018-12-03 ASSESSMENT — SLEEP AND FATIGUE QUESTIONNAIRES
HOW LIKELY ARE YOU TO NOD OFF OR FALL ASLEEP WHEN YOU ARE A PASSENGER IN A CAR FOR AN HOUR WITHOUT A BREAK: 1
HOW LIKELY ARE YOU TO NOD OFF OR FALL ASLEEP WHILE SITTING QUIETLY AFTER LUNCH WITHOUT ALCOHOL: 0
HOW LIKELY ARE YOU TO NOD OFF OR FALL ASLEEP WHILE SITTING AND READING: 1
HOW LIKELY ARE YOU TO NOD OFF OR FALL ASLEEP WHILE SITTING INACTIVE IN A PUBLIC PLACE: 0
HOW LIKELY ARE YOU TO NOD OFF OR FALL ASLEEP WHILE LYING DOWN TO REST IN THE AFTERNOON WHEN CIRCUMSTANCES PERMIT: 1
HOW LIKELY ARE YOU TO NOD OFF OR FALL ASLEEP IN A CAR, WHILE STOPPED FOR A FEW MINUTES IN TRAFFIC: 0
HOW LIKELY ARE YOU TO NOD OFF OR FALL ASLEEP WHILE SITTING AND TALKING TO SOMEONE: 0
ESS TOTAL SCORE: 4
HOW LIKELY ARE YOU TO NOD OFF OR FALL ASLEEP WHILE WATCHING TV: 1

## 2018-12-03 ASSESSMENT — PAIN DESCRIPTION - ORIENTATION: ORIENTATION: RIGHT

## 2018-12-03 ASSESSMENT — PAIN SCALES - GENERAL
PAINLEVEL_OUTOF10: 2
PAINLEVEL_OUTOF10: 5

## 2018-12-03 ASSESSMENT — PAIN DESCRIPTION - LOCATION: LOCATION: BACK;SHOULDER;HIP
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
## 2018-12-21 ENCOUNTER — OFFICE VISIT (OUTPATIENT)
Dept: BARIATRICS/WEIGHT MGMT | Age: 38
End: 2018-12-21
Payer: COMMERCIAL

## 2018-12-21 VITALS
HEART RATE: 74 BPM | RESPIRATION RATE: 22 BRPM | DIASTOLIC BLOOD PRESSURE: 60 MMHG | BODY MASS INDEX: 47.98 KG/M2 | SYSTOLIC BLOOD PRESSURE: 128 MMHG | WEIGHT: 288 LBS | HEIGHT: 65 IN

## 2018-12-21 DIAGNOSIS — G47.33 OSA (OBSTRUCTIVE SLEEP APNEA): Primary | ICD-10-CM

## 2018-12-21 DIAGNOSIS — M54.9 CHRONIC BACK PAIN, UNSPECIFIED BACK LOCATION, UNSPECIFIED BACK PAIN LATERALITY: ICD-10-CM

## 2018-12-21 DIAGNOSIS — G89.29 CHRONIC BACK PAIN, UNSPECIFIED BACK LOCATION, UNSPECIFIED BACK PAIN LATERALITY: ICD-10-CM

## 2018-12-21 DIAGNOSIS — Z86.59 HISTORY OF DEPRESSION: ICD-10-CM

## 2018-12-21 DIAGNOSIS — K21.9 GERD WITHOUT ESOPHAGITIS: ICD-10-CM

## 2018-12-21 DIAGNOSIS — E66.01 OBESITY, CLASS III, BMI 40-49.9 (MORBID OBESITY) (HCC): ICD-10-CM

## 2018-12-21 PROCEDURE — 99213 OFFICE O/P EST LOW 20 MIN: CPT | Performed by: NURSE PRACTITIONER

## 2018-12-21 NOTE — PROGRESS NOTES
Medical Nutrition Therapy   Metabolic and Bariatric Surgery         Supervised diet and exercise preparation  Visit 5 out of 6  Pt reports: found her weight trending up on current diet structure but made some \"tweaks\" which has helped her weight start trending back down. Pt currently following structured meal plan low carbohydrate diet for weight management. Reviewed with pt. Vitals: Wt Readings from Last 3 Encounters:   12/21/18 288 lb (130.6 kg)   12/03/18 290 lb (131.5 kg)   12/03/18 292 lb 3.2 oz (132.5 kg)     gained 3 lbs over 1 month. Nutrition Assessment:   PES: Knowledge deficit related to healthy behaviors that support weight management post weight loss surgery as evidenced by Body mass index is 47.93 kg/m². Nutrition Assessment of Goal Attainment:  TREATMENT GOALS:    1. Pt  Completed 4 out of 4 goals. 2.TREATMENT GOALS FOR UPCOMING WEEK: continue all previous goals and add: # 0    All goals were planned with and agreed on by the patient. Goal Card  Name                                                                                                                           Noland Meiers Schober  1. I will read the entire patient educational binder provided to me prior to my second appointment at [facility]. 2. I will make my psychological evaluation appointment prior to my second appointment at [facility]. 3. I will bring this tracking tool to every appointment with a health care provider at [facility]. 4. I will eliminate all nicotine, tobacco and e-cigarettes prior to surgery. 5. I will limit alcoholic beverages prior to surgery to 1 mixed drink or glass of wine (4-6oz). 6. I will limit dining out including fast food to 3 times a week prior to surgery. 7. I will eliminate sugary beverages prior to surgery. 8. I will eliminate carbonated beverages prior to surgery. 9. I will eliminate drinking with a straw prior to surgery.    10. I will limit caffeinated beverages

## 2019-01-08 ENCOUNTER — HOSPITAL ENCOUNTER (OUTPATIENT)
Age: 39
Discharge: HOME | End: 2019-01-08
Payer: COMMERCIAL

## 2019-01-08 DIAGNOSIS — R06.83 SNORING: ICD-10-CM

## 2019-01-08 DIAGNOSIS — E66.01 MORBID OBESITY (HCC): ICD-10-CM

## 2019-01-08 DIAGNOSIS — K21.9 GASTROESOPHAGEAL REFLUX DISEASE WITHOUT ESOPHAGITIS: ICD-10-CM

## 2019-01-08 LAB
ABSOLUTE EOS #: 0.1 K/UL (ref 0–0.44)
ABSOLUTE IMMATURE GRANULOCYTE: <0.03 K/UL (ref 0–0.3)
ABSOLUTE LYMPH #: 2.4 K/UL (ref 1.1–3.7)
ABSOLUTE MONO #: 0.31 K/UL (ref 0.1–1.2)
ALBUMIN SERPL-MCNC: 4.1 G/DL (ref 3.5–5.2)
ALBUMIN/GLOBULIN RATIO: 1.4 (ref 1–2.5)
ALP BLD-CCNC: 51 U/L (ref 35–104)
ALT SERPL-CCNC: 31 U/L (ref 5–33)
ANION GAP SERPL CALCULATED.3IONS-SCNC: 17 MMOL/L (ref 9–17)
AST SERPL-CCNC: 18 U/L
BASOPHILS # BLD: 0 % (ref 0–2)
BASOPHILS ABSOLUTE: <0.03 K/UL (ref 0–0.2)
BILIRUB SERPL-MCNC: 0.37 MG/DL (ref 0.3–1.2)
BUN BLDV-MCNC: 14 MG/DL (ref 6–20)
BUN/CREAT BLD: ABNORMAL (ref 9–20)
CALCIUM SERPL-MCNC: 9.3 MG/DL (ref 8.6–10.4)
CHLORIDE BLD-SCNC: 106 MMOL/L (ref 98–107)
CHOLESTEROL/HDL RATIO: 5.2
CHOLESTEROL: 208 MG/DL
CO2: 20 MMOL/L (ref 20–31)
CREAT SERPL-MCNC: 0.57 MG/DL (ref 0.5–0.9)
DIFFERENTIAL TYPE: ABNORMAL
EOSINOPHILS RELATIVE PERCENT: 2 % (ref 1–4)
GFR AFRICAN AMERICAN: >60 ML/MIN
GFR NON-AFRICAN AMERICAN: >60 ML/MIN
GFR SERPL CREATININE-BSD FRML MDRD: ABNORMAL ML/MIN/{1.73_M2}
GFR SERPL CREATININE-BSD FRML MDRD: ABNORMAL ML/MIN/{1.73_M2}
GLUCOSE BLD-MCNC: 111 MG/DL (ref 70–99)
HCT VFR BLD CALC: 40.8 % (ref 36.3–47.1)
HDLC SERPL-MCNC: 40 MG/DL
HEMOGLOBIN: 13.2 G/DL (ref 11.9–15.1)
IMMATURE GRANULOCYTES: 0 %
LDL CHOLESTEROL: 147 MG/DL (ref 0–130)
LYMPHOCYTES # BLD: 36 % (ref 24–43)
MAGNESIUM: 2.2 MG/DL (ref 1.6–2.6)
MCH RBC QN AUTO: 28.8 PG (ref 25.2–33.5)
MCHC RBC AUTO-ENTMCNC: 32.4 G/DL (ref 28.4–34.8)
MCV RBC AUTO: 88.9 FL (ref 82.6–102.9)
MONOCYTES # BLD: 5 % (ref 3–12)
NRBC AUTOMATED: 0 PER 100 WBC
PDW BLD-RTO: 11.7 % (ref 11.8–14.4)
PLATELET # BLD: 283 K/UL (ref 138–453)
PLATELET ESTIMATE: ABNORMAL
PMV BLD AUTO: 9.8 FL (ref 8.1–13.5)
POTASSIUM SERPL-SCNC: 4.1 MMOL/L (ref 3.7–5.3)
PTH INTACT: 59.02 PG/ML (ref 15–65)
RBC # BLD: 4.59 M/UL (ref 3.95–5.11)
RBC # BLD: ABNORMAL 10*6/UL
SEG NEUTROPHILS: 57 % (ref 36–65)
SEGMENTED NEUTROPHILS ABSOLUTE COUNT: 3.8 K/UL (ref 1.5–8.1)
SODIUM BLD-SCNC: 143 MMOL/L (ref 135–144)
THYROXINE, FREE: 1.23 NG/DL (ref 0.93–1.7)
TOTAL PROTEIN: 7.1 G/DL (ref 6.4–8.3)
TRIGL SERPL-MCNC: 106 MG/DL
TSH SERPL DL<=0.05 MIU/L-ACNC: 1.29 MIU/L (ref 0.3–5)
VLDLC SERPL CALC-MCNC: ABNORMAL MG/DL (ref 1–30)
WBC # BLD: 6.6 K/UL (ref 3.5–11.3)
WBC # BLD: ABNORMAL 10*3/UL

## 2019-01-08 PROCEDURE — 82728 ASSAY OF FERRITIN: CPT

## 2019-01-08 PROCEDURE — 83735 ASSAY OF MAGNESIUM: CPT

## 2019-01-08 PROCEDURE — 82306 VITAMIN D 25 HYDROXY: CPT

## 2019-01-08 PROCEDURE — 80061 LIPID PANEL: CPT

## 2019-01-08 PROCEDURE — 83036 HEMOGLOBIN GLYCOSYLATED A1C: CPT

## 2019-01-08 PROCEDURE — 84439 ASSAY OF FREE THYROXINE: CPT

## 2019-01-08 PROCEDURE — 84630 ASSAY OF ZINC: CPT

## 2019-01-08 PROCEDURE — 82746 ASSAY OF FOLIC ACID SERUM: CPT

## 2019-01-08 PROCEDURE — 82607 VITAMIN B-12: CPT

## 2019-01-08 PROCEDURE — 83970 ASSAY OF PARATHORMONE: CPT

## 2019-01-08 PROCEDURE — 80053 COMPREHEN METABOLIC PANEL: CPT

## 2019-01-08 PROCEDURE — 84590 ASSAY OF VITAMIN A: CPT

## 2019-01-08 PROCEDURE — G0480 DRUG TEST DEF 1-7 CLASSES: HCPCS

## 2019-01-08 PROCEDURE — 83540 ASSAY OF IRON: CPT

## 2019-01-08 PROCEDURE — 85025 COMPLETE CBC W/AUTO DIFF WBC: CPT

## 2019-01-08 PROCEDURE — 83550 IRON BINDING TEST: CPT

## 2019-01-08 PROCEDURE — 84443 ASSAY THYROID STIM HORMONE: CPT

## 2019-01-08 PROCEDURE — 36415 COLL VENOUS BLD VENIPUNCTURE: CPT

## 2019-01-08 PROCEDURE — 84425 ASSAY OF VITAMIN B-1: CPT

## 2019-01-09 LAB
ESTIMATED AVERAGE GLUCOSE: 100 MG/DL
FERRITIN: 112 UG/L (ref 13–150)
FOLATE: 9.7 NG/ML
HBA1C MFR BLD: 5.1 % (ref 4–6)
IRON SATURATION: 25 % (ref 20–55)
IRON: 76 UG/DL (ref 37–145)
TOTAL IRON BINDING CAPACITY: 309 UG/DL (ref 250–450)
UNSATURATED IRON BINDING CAPACITY: 233 UG/DL (ref 112–347)
VITAMIN B-12: 379 PG/ML (ref 232–1245)
VITAMIN D 25-HYDROXY: 12.3 NG/ML (ref 30–100)

## 2019-01-11 LAB
3-OH-COTININE: <2 NG/ML
COTININE: <2 NG/ML
NICOTINE: <2 NG/ML
RETINYL PALMITATE: 0.02 MG/L (ref 0–0.1)
VITAMIN A LEVEL: 0.54 MG/L (ref 0.3–1.2)
VITAMIN A, INTERP: NORMAL

## 2019-01-12 LAB — VITAMIN B1 WHOLE BLOOD: 88 NMOL/L (ref 70–180)

## 2019-01-13 LAB — ZINC: 72 UG/DL (ref 60–120)

## 2019-01-18 ENCOUNTER — OFFICE VISIT (OUTPATIENT)
Dept: BARIATRICS/WEIGHT MGMT | Age: 39
End: 2019-01-18
Payer: COMMERCIAL

## 2019-01-18 VITALS
WEIGHT: 290 LBS | SYSTOLIC BLOOD PRESSURE: 126 MMHG | DIASTOLIC BLOOD PRESSURE: 72 MMHG | HEART RATE: 80 BPM | HEIGHT: 65 IN | RESPIRATION RATE: 22 BRPM | BODY MASS INDEX: 48.32 KG/M2

## 2019-01-18 DIAGNOSIS — G89.29 CHRONIC BACK PAIN, UNSPECIFIED BACK LOCATION, UNSPECIFIED BACK PAIN LATERALITY: ICD-10-CM

## 2019-01-18 DIAGNOSIS — K21.9 GERD WITHOUT ESOPHAGITIS: ICD-10-CM

## 2019-01-18 DIAGNOSIS — G47.33 OSA (OBSTRUCTIVE SLEEP APNEA): Primary | ICD-10-CM

## 2019-01-18 DIAGNOSIS — E55.9 VITAMIN D DEFICIENCY: ICD-10-CM

## 2019-01-18 DIAGNOSIS — Z86.59 HISTORY OF DEPRESSION: ICD-10-CM

## 2019-01-18 DIAGNOSIS — M54.9 CHRONIC BACK PAIN, UNSPECIFIED BACK LOCATION, UNSPECIFIED BACK PAIN LATERALITY: ICD-10-CM

## 2019-01-18 DIAGNOSIS — E66.01 OBESITY, CLASS III, BMI 40-49.9 (MORBID OBESITY) (HCC): ICD-10-CM

## 2019-01-18 PROCEDURE — 99213 OFFICE O/P EST LOW 20 MIN: CPT | Performed by: NURSE PRACTITIONER

## 2019-01-18 RX ORDER — ERGOCALCIFEROL 1.25 MG/1
50000 CAPSULE ORAL WEEKLY
Qty: 8 CAPSULE | Refills: 0 | Status: ON HOLD | OUTPATIENT
Start: 2019-01-18 | End: 2019-04-16 | Stop reason: HOSPADM

## 2019-02-19 ENCOUNTER — OFFICE VISIT (OUTPATIENT)
Dept: BARIATRICS/WEIGHT MGMT | Age: 39
End: 2019-02-19
Payer: COMMERCIAL

## 2019-02-19 VITALS — BODY MASS INDEX: 46.59 KG/M2 | WEIGHT: 280 LBS

## 2019-02-19 DIAGNOSIS — E66.01 MORBID OBESITY WITH BMI OF 40.0-44.9, ADULT (HCC): Primary | ICD-10-CM

## 2019-02-19 PROCEDURE — 99211 OFF/OP EST MAY X REQ PHY/QHP: CPT | Performed by: SURGERY

## 2019-02-22 ENCOUNTER — TELEPHONE (OUTPATIENT)
Dept: BARIATRICS/WEIGHT MGMT | Age: 39
End: 2019-02-22

## 2019-02-22 NOTE — TELEPHONE ENCOUNTER
Patient's record has been submitted to the insurance company on 2/22/19 for authorization to schedule surgery. Instructions to patient: if patient calls for status on authorization to schedule surgery please instruct patient that it could take up to 30 days for an authorization. Once authorization is received the insurance specialists will contact the patient to schedule their procedure.       Program fee paid in full yes

## 2019-04-01 ENCOUNTER — NURSE ONLY (OUTPATIENT)
Dept: BARIATRICS/WEIGHT MGMT | Age: 39
End: 2019-04-01

## 2019-04-01 NOTE — PROGRESS NOTES
Pt attended Bariatric Surgery pre-op class. Pt verbalized understanding and signed Surgical Patient Agreement. 2 week pre-op diet reviewed. Vitamin, calcium and protein shake use reviewed. Post- op diet Phases 1 to 4 covered. Pt allowed the opportunity to ask questions.       Instructions on ERAS protocol included

## 2019-04-02 ENCOUNTER — HOSPITAL ENCOUNTER (OUTPATIENT)
Dept: PREADMISSION TESTING | Age: 39
Discharge: HOME | End: 2019-04-06
Payer: COMMERCIAL

## 2019-04-02 ENCOUNTER — HOSPITAL ENCOUNTER (OUTPATIENT)
Dept: GENERAL RADIOLOGY | Age: 39
Discharge: HOME | End: 2019-04-04
Payer: COMMERCIAL

## 2019-04-02 VITALS
HEIGHT: 65 IN | WEIGHT: 291.01 LBS | BODY MASS INDEX: 48.48 KG/M2 | RESPIRATION RATE: 16 BRPM | OXYGEN SATURATION: 98 % | DIASTOLIC BLOOD PRESSURE: 71 MMHG | TEMPERATURE: 98.3 F | HEART RATE: 74 BPM | SYSTOLIC BLOOD PRESSURE: 115 MMHG

## 2019-04-02 LAB
ALP BLD-CCNC: 59 U/L (ref 35–104)
ALT SERPL-CCNC: 17 U/L (ref 5–33)
ANION GAP SERPL CALCULATED.3IONS-SCNC: 12 MMOL/L (ref 9–17)
BUN BLDV-MCNC: 15 MG/DL (ref 6–20)
BUN/CREAT BLD: ABNORMAL (ref 9–20)
CALCIUM SERPL-MCNC: 9.2 MG/DL (ref 8.6–10.4)
CHLORIDE BLD-SCNC: 105 MMOL/L (ref 98–107)
CO2: 23 MMOL/L (ref 20–31)
CREAT SERPL-MCNC: 0.49 MG/DL (ref 0.5–0.9)
GFR AFRICAN AMERICAN: >60 ML/MIN
GFR NON-AFRICAN AMERICAN: >60 ML/MIN
GFR SERPL CREATININE-BSD FRML MDRD: ABNORMAL ML/MIN/{1.73_M2}
GFR SERPL CREATININE-BSD FRML MDRD: ABNORMAL ML/MIN/{1.73_M2}
GLUCOSE BLD-MCNC: 106 MG/DL (ref 70–99)
HCT VFR BLD CALC: 39.4 % (ref 36.3–47.1)
HEMOGLOBIN: 13.2 G/DL (ref 11.9–15.1)
INR BLD: 0.9
MCH RBC QN AUTO: 29.5 PG (ref 25.2–33.5)
MCHC RBC AUTO-ENTMCNC: 33.5 G/DL (ref 28.4–34.8)
MCV RBC AUTO: 88.1 FL (ref 82.6–102.9)
NRBC AUTOMATED: 0 PER 100 WBC
PARTIAL THROMBOPLASTIN TIME: 23.9 SEC (ref 20.5–30.5)
PDW BLD-RTO: 12 % (ref 11.8–14.4)
PLATELET # BLD: 284 K/UL (ref 138–453)
PMV BLD AUTO: 9.6 FL (ref 8.1–13.5)
POTASSIUM SERPL-SCNC: 4.1 MMOL/L (ref 3.7–5.3)
PROTHROMBIN TIME: 9.5 SEC (ref 9–12)
RBC # BLD: 4.47 M/UL (ref 3.95–5.11)
SODIUM BLD-SCNC: 140 MMOL/L (ref 135–144)
WBC # BLD: 7 K/UL (ref 3.5–11.3)

## 2019-04-02 PROCEDURE — 85027 COMPLETE CBC AUTOMATED: CPT

## 2019-04-02 PROCEDURE — 85610 PROTHROMBIN TIME: CPT

## 2019-04-02 PROCEDURE — 36415 COLL VENOUS BLD VENIPUNCTURE: CPT

## 2019-04-02 PROCEDURE — G0480 DRUG TEST DEF 1-7 CLASSES: HCPCS

## 2019-04-02 PROCEDURE — 85730 THROMBOPLASTIN TIME PARTIAL: CPT

## 2019-04-02 PROCEDURE — 84460 ALANINE AMINO (ALT) (SGPT): CPT

## 2019-04-02 PROCEDURE — 71046 X-RAY EXAM CHEST 2 VIEWS: CPT

## 2019-04-02 PROCEDURE — 80048 BASIC METABOLIC PNL TOTAL CA: CPT

## 2019-04-02 PROCEDURE — 93005 ELECTROCARDIOGRAM TRACING: CPT

## 2019-04-02 PROCEDURE — 84075 ASSAY ALKALINE PHOSPHATASE: CPT

## 2019-04-02 RX ORDER — SODIUM CHLORIDE, SODIUM LACTATE, POTASSIUM CHLORIDE, CALCIUM CHLORIDE 600; 310; 30; 20 MG/100ML; MG/100ML; MG/100ML; MG/100ML
1000 INJECTION, SOLUTION INTRAVENOUS CONTINUOUS
Status: CANCELLED | OUTPATIENT
Start: 2019-04-02

## 2019-04-02 NOTE — H&P
History and Physical    Pt Name: Yumiko Blandon  MRN: [id number]  YOB: 1980  Date of evaluation: 4/2/2019  Primary Care Physician: Cristina Kee MD  Patient evaluated at the request of  Dr. Gela Novak    Reason for evaluation:   Morbid  obesity    SUBJECTIVE:   History of Chief Complaint:    Monik Keita is a 45 y.o. female who has struggled with her weight for many years Her highest weight has Been 310 lbs approx ,  her lowest weight in the last 5 yrs has been 255 lbs ,  she has tried diet and exercise , yet unable to achieve adequate weight removal .         Past Medical History      has a past medical history of Back pain, Depression, Migraines, and Sleep apnea. Past Surgical History     has a past surgical history that includes partial hysterectomy (cervix not removed) and Upper gastrointestinal endoscopy (N/A, 11/23/2018). Medications  Current Meds:   Current Outpatient Rx   Medication Sig Dispense Refill    vitamin D (ERGOCALCIFEROL) 81141 units CAPS capsule Take 1 capsule by mouth once a week for 8 doses 8 capsule 0    cyclobenzaprine (FLEXERIL) 10 MG tablet 1 tablet as needed      meloxicam (MOBIC) 15 MG tablet 1 tablet(s)      ALPRAZolam (XANAX) 0.5 MG tablet 1 tablet as needed       Continuous Infusions:  PRN Meds:. Allergies  has No Known Allergies. Family History  family history includes Arthritis in her mother; Depression in her mother; Heart Disease in her father.     Family Status   Relation Name Status    Mother  (Not Specified)    Father  (Not Specified)         Social History  Social History     Socioeconomic History    Marital status:      Spouse name: Not on file    Number of children: Not on file    Years of education: Not on file    Highest education level: Not on file   Occupational History    Not on file   Social Needs    Financial resource strain: Not on file    Food insecurity:     Worry: Not on file Dorsal pedal/posterior tibial pulses palpable: Yes     Strength : Normal           Patient Active Problem List   Diagnosis    GERD without esophagitis    History of depression    Obesity, Class III, BMI 40-49.9 (morbid obesity) (HCC)    Chronic back pain    CURLY (obstructive sleep apnea)    Vitamin D deficiency                   Scheduled for: CHARLA Ashby laparoscopic gastric bypass roun-en-Y, liver bx , EGD         FELICITAS Higgins PAC  Electronically signed 4/2/2019 at 8:11 AM       :

## 2019-04-02 NOTE — H&P (VIEW-ONLY)
History and Physical    Pt Name: Josephine Montano  MRN: [id number]  YOB: 1980  Date of evaluation: 4/2/2019  Primary Care Physician: Brenda Ortega MD  Patient evaluated at the request of  Dr. Bj Bacon    Reason for evaluation:   Morbid  obesity    SUBJECTIVE:   History of Chief Complaint:    Yoni Muñoz is a 45 y.o. female who has struggled with her weight for many years Her highest weight has Been 310 lbs approx ,  her lowest weight in the last 5 yrs has been 255 lbs ,  she has tried diet and exercise , yet unable to achieve adequate weight removal .         Past Medical History      has a past medical history of Back pain, Depression, Migraines, and Sleep apnea. Past Surgical History     has a past surgical history that includes partial hysterectomy (cervix not removed) and Upper gastrointestinal endoscopy (N/A, 11/23/2018). Medications  Current Meds:   Current Outpatient Rx   Medication Sig Dispense Refill    vitamin D (ERGOCALCIFEROL) 42026 units CAPS capsule Take 1 capsule by mouth once a week for 8 doses 8 capsule 0    cyclobenzaprine (FLEXERIL) 10 MG tablet 1 tablet as needed      meloxicam (MOBIC) 15 MG tablet 1 tablet(s)      ALPRAZolam (XANAX) 0.5 MG tablet 1 tablet as needed       Continuous Infusions:  PRN Meds:. Allergies  has No Known Allergies. Family History  family history includes Arthritis in her mother; Depression in her mother; Heart Disease in her father.     Family Status   Relation Name Status    Mother  (Not Specified)    Father  (Not Specified)         Social History  Social History     Socioeconomic History    Marital status:      Spouse name: Not on file    Number of children: Not on file    Years of education: Not on file    Highest education level: Not on file   Occupational History    Not on file   Social Needs    Financial resource strain: Not on file    Food insecurity:     Worry: Not on file Inability: Not on file    Transportation needs:     Medical: Not on file     Non-medical: Not on file   Tobacco Use    Smoking status: Never Smoker    Smokeless tobacco: Never Used   Substance and Sexual Activity    Alcohol use: Yes    Drug use: Yes     Types: Marijuana     Comment: use marijuana sometimes     Sexual activity: Not on file   Lifestyle    Physical activity:     Days per week: Not on file     Minutes per session: Not on file    Stress: Not on file   Relationships    Social connections:     Talks on phone: Not on file     Gets together: Not on file     Attends [employer] service: Not on file     Active member of club or organization: Not on file     Attends meetings of clubs or organizations: Not on file     Relationship status: Not on file    Intimate partner violence:     Fear of current or ex partner: Not on file     Emotionally abused: Not on file     Physically abused: Not on file     Forced sexual activity: Not on file   Other Topics Concern    Not on file   Social History Narrative    Not on file                 Hobbies :   [employer] , arts and crafts   OBJECTIVE:   VITALS:  vitals were not taken for this visit. CONSTITUTIONAL: Alert and oriented times 3, no acute distress and cooperative to examination. Friendly and pleasant     SKIN: rash No    HEENT: Head is normocephalic, atraumatic. EOMI, PERRLA    Oral air way :slightly narrow Yes    NECK: neck supple, no lymphadenopathy noted, trachea midline and straight      2+ carotid, no bruit    LUNGS: Chest expands equally bilaterally upon respiration, no accessory muscle used. Ausculation reveals no adventitious breath sounds. CARDIOVASCULAR: \"Heart sounds are normal.  Regular rate and rhythm without murmur,    ABDOMEN: Bowel sounds are present in all four quadrants, morbid obesity      GENATALIA:Deferred. NEUROLOGIC: \"CN II-XII are grossly intact.      EXTREMITIES: Pitting edema:  Slight     Varicose veins: No Dorsal pedal/posterior tibial pulses palpable: Yes     Strength : Normal           Patient Active Problem List   Diagnosis    GERD without esophagitis    History of depression    Obesity, Class III, BMI 40-49.9 (morbid obesity) (HCC)    Chronic back pain    CURLY (obstructive sleep apnea)    Vitamin D deficiency                   Scheduled for: CHARLA Ashby laparoscopic gastric bypass roun-en-Y, liver bx , EGD         FELICITAS Russell PAC  Electronically signed 4/2/2019 at 8:11 AM       :

## 2019-04-02 NOTE — H&P
Anesthesia Focused Assessment    STOP-BANG Sleep Apnea Questionnaire    Obstructive Sleep Apnea:                                                                 Yes     Machine used: Yes            SNORE loudly (heard through closed doors)? Yes      TIRED, fatigued, sleepy during daytime? Yes      OBSERVED stopping breathing during sleep? Yes      High blood PRESSURE being treated? No      BMI over 35? Yes  AGE over 48? No  NECK circumference over 16\"? Yes  GENDER (male)? No                High risk 5-8  Intermediate risk 3-4  Low risk 0-2    Total 3  High risk 5-8  Intermediate risk 3-4  Low risk 0-2      Type 1 DM:                                                                                        No    TYPE 2:                                                                                             No    If yes, insulin dependent? No    Coronary Artery Disease :                                                                No        Active smoker                                                                                   No    Drinks Alcohol                                                                                   No    Dentition: Dentures                                                                            No              Partial                                                                                                 No    Any loose or missing teeth                                                                 No    Defib / AICD / Pacemaker:                                                               No    Renal Failure: No    If Yes, On dialysis?       Hx of anesthesia complications with Patient                               No    Hx of anesthesia complications with family

## 2019-04-03 LAB
EKG ATRIAL RATE: 74 BPM
EKG P AXIS: 44 DEGREES
EKG P-R INTERVAL: 150 MS
EKG Q-T INTERVAL: 390 MS
EKG QRS DURATION: 90 MS
EKG QTC CALCULATION (BAZETT): 432 MS
EKG R AXIS: 56 DEGREES
EKG T AXIS: 51 DEGREES
EKG VENTRICULAR RATE: 74 BPM

## 2019-04-07 LAB
3-OH-COTININE: <2 NG/ML
COTININE: <2 NG/ML
NICOTINE: <2 NG/ML

## 2019-04-12 ENCOUNTER — TELEPHONE (OUTPATIENT)
Dept: BARIATRICS/WEIGHT MGMT | Age: 39
End: 2019-04-12

## 2019-04-12 NOTE — TELEPHONE ENCOUNTER
Pt was scheduled to do consent signing and get neurontin rx. Due to Dr. Drew Perkins being called to surgery pt will sign consents day of surgery. Can you please send Rx for neurontin to patient pharmacy?  For the ERAS protocol

## 2019-04-15 ENCOUNTER — ANESTHESIA EVENT (OUTPATIENT)
Dept: OPERATING ROOM | Age: 39
DRG: 621 | End: 2019-04-15
Payer: COMMERCIAL

## 2019-04-15 NOTE — TELEPHONE ENCOUNTER
Per dr. Skylar Beard please call in Neurontin 300mg 1 tab daily for 2 days, qty 2 no refills to patient pharmacy.

## 2019-04-16 ENCOUNTER — ANESTHESIA (OUTPATIENT)
Dept: OPERATING ROOM | Age: 39
DRG: 621 | End: 2019-04-16
Payer: COMMERCIAL

## 2019-04-16 ENCOUNTER — HOSPITAL ENCOUNTER (INPATIENT)
Age: 39
LOS: 2 days | Discharge: HOME | DRG: 621 | End: 2019-04-18
Attending: SURGERY | Admitting: SURGERY
Payer: COMMERCIAL

## 2019-04-16 VITALS — DIASTOLIC BLOOD PRESSURE: 74 MMHG | TEMPERATURE: 96.4 F | OXYGEN SATURATION: 100 % | SYSTOLIC BLOOD PRESSURE: 136 MMHG

## 2019-04-16 DIAGNOSIS — G89.18 PAIN ASSOCIATED WITH SURGICAL PROCEDURE: Primary | ICD-10-CM

## 2019-04-16 PROBLEM — Z98.84 STATUS POST GASTRIC BYPASS FOR OBESITY: Status: ACTIVE | Noted: 2019-04-16

## 2019-04-16 LAB
ANION GAP SERPL CALCULATED.3IONS-SCNC: 16 MMOL/L (ref 9–17)
BUN BLDV-MCNC: 14 MG/DL (ref 6–20)
BUN/CREAT BLD: ABNORMAL (ref 9–20)
CALCIUM SERPL-MCNC: 8.9 MG/DL (ref 8.6–10.4)
CHLORIDE BLD-SCNC: 105 MMOL/L (ref 98–107)
CO2: 18 MMOL/L (ref 20–31)
CREAT SERPL-MCNC: 0.58 MG/DL (ref 0.5–0.9)
GFR AFRICAN AMERICAN: >60 ML/MIN
GFR NON-AFRICAN AMERICAN: >60 ML/MIN
GFR SERPL CREATININE-BSD FRML MDRD: ABNORMAL ML/MIN/{1.73_M2}
GFR SERPL CREATININE-BSD FRML MDRD: ABNORMAL ML/MIN/{1.73_M2}
GLUCOSE BLD-MCNC: 184 MG/DL (ref 70–99)
HCT VFR BLD CALC: 39.9 % (ref 36.3–47.1)
HEMOGLOBIN: 12.9 G/DL (ref 11.9–15.1)
MCH RBC QN AUTO: 29.9 PG (ref 25.2–33.5)
MCHC RBC AUTO-ENTMCNC: 32.3 G/DL (ref 28.4–34.8)
MCV RBC AUTO: 92.6 FL (ref 82.6–102.9)
NRBC AUTOMATED: 0 PER 100 WBC
PDW BLD-RTO: 12.2 % (ref 11.8–14.4)
PLATELET # BLD: 231 K/UL (ref 138–453)
PMV BLD AUTO: 10.2 FL (ref 8.1–13.5)
POTASSIUM SERPL-SCNC: 3.6 MMOL/L (ref 3.7–5.3)
RBC # BLD: 4.31 M/UL (ref 3.95–5.11)
SODIUM BLD-SCNC: 139 MMOL/L (ref 135–144)
WBC # BLD: 15.5 K/UL (ref 3.5–11.3)

## 2019-04-16 PROCEDURE — 8E0W4CZ ROBOTIC ASSISTED PROCEDURE OF TRUNK REGION, PERCUTANEOUS ENDOSCOPIC APPROACH: ICD-10-PCS | Performed by: SURGERY

## 2019-04-16 PROCEDURE — 2580000003 HC RX 258: Performed by: SURGERY

## 2019-04-16 PROCEDURE — 3600000019 HC SURGERY ROBOT ADDTL 15MIN: Performed by: SURGERY

## 2019-04-16 PROCEDURE — 2500000003 HC RX 250 WO HCPCS: Performed by: SURGERY

## 2019-04-16 PROCEDURE — 7100000000 HC PACU RECOVERY - FIRST 15 MIN: Performed by: SURGERY

## 2019-04-16 PROCEDURE — 43644 LAP GASTRIC BYPASS/ROUX-EN-Y: CPT | Performed by: SURGERY

## 2019-04-16 PROCEDURE — 36415 COLL VENOUS BLD VENIPUNCTURE: CPT

## 2019-04-16 PROCEDURE — 6360000002 HC RX W HCPCS: Performed by: SURGERY

## 2019-04-16 PROCEDURE — 85027 COMPLETE CBC AUTOMATED: CPT

## 2019-04-16 PROCEDURE — S2900 ROBOTIC SURGICAL SYSTEM: HCPCS | Performed by: SURGERY

## 2019-04-16 PROCEDURE — 6360000002 HC RX W HCPCS: Performed by: NURSE ANESTHETIST, CERTIFIED REGISTERED

## 2019-04-16 PROCEDURE — 0D164ZA BYPASS STOMACH TO JEJUNUM, PERCUTANEOUS ENDOSCOPIC APPROACH: ICD-10-PCS | Performed by: SURGERY

## 2019-04-16 PROCEDURE — 3700000000 HC ANESTHESIA ATTENDED CARE: Performed by: SURGERY

## 2019-04-16 PROCEDURE — 7100000001 HC PACU RECOVERY - ADDTL 15 MIN: Performed by: SURGERY

## 2019-04-16 PROCEDURE — 2709999900 HC NON-CHARGEABLE SUPPLY: Performed by: SURGERY

## 2019-04-16 PROCEDURE — 0DJ08ZZ INSPECTION OF UPPER INTESTINAL TRACT, VIA NATURAL OR ARTIFICIAL OPENING ENDOSCOPIC: ICD-10-PCS | Performed by: SURGERY

## 2019-04-16 PROCEDURE — 6360000002 HC RX W HCPCS: Performed by: ANESTHESIOLOGY

## 2019-04-16 PROCEDURE — 80048 BASIC METABOLIC PNL TOTAL CA: CPT

## 2019-04-16 PROCEDURE — 3700000001 HC ADD 15 MINUTES (ANESTHESIA): Performed by: SURGERY

## 2019-04-16 PROCEDURE — 94760 N-INVAS EAR/PLS OXIMETRY 1: CPT

## 2019-04-16 PROCEDURE — 1200000000 HC SEMI PRIVATE

## 2019-04-16 PROCEDURE — 2720000010 HC SURG SUPPLY STERILE: Performed by: SURGERY

## 2019-04-16 PROCEDURE — 6360000002 HC RX W HCPCS

## 2019-04-16 PROCEDURE — 2700000000 HC OXYGEN THERAPY PER DAY

## 2019-04-16 PROCEDURE — 2500000003 HC RX 250 WO HCPCS: Performed by: NURSE ANESTHETIST, CERTIFIED REGISTERED

## 2019-04-16 PROCEDURE — 2580000003 HC RX 258: Performed by: ANESTHESIOLOGY

## 2019-04-16 PROCEDURE — 6370000000 HC RX 637 (ALT 250 FOR IP): Performed by: SURGERY

## 2019-04-16 PROCEDURE — 3600000009 HC SURGERY ROBOT BASE: Performed by: SURGERY

## 2019-04-16 PROCEDURE — 76937 US GUIDE VASCULAR ACCESS: CPT

## 2019-04-16 RX ORDER — FENTANYL CITRATE 50 UG/ML
50 INJECTION, SOLUTION INTRAMUSCULAR; INTRAVENOUS EVERY 5 MIN PRN
Status: COMPLETED | OUTPATIENT
Start: 2019-04-16 | End: 2019-04-16

## 2019-04-16 RX ORDER — MIDAZOLAM HYDROCHLORIDE 1 MG/ML
1 INJECTION INTRAMUSCULAR; INTRAVENOUS EVERY 10 MIN PRN
Status: DISCONTINUED | OUTPATIENT
Start: 2019-04-16 | End: 2019-04-16 | Stop reason: HOSPADM

## 2019-04-16 RX ORDER — GLYCOPYRROLATE 1 MG/5 ML
SYRINGE (ML) INTRAVENOUS PRN
Status: DISCONTINUED | OUTPATIENT
Start: 2019-04-16 | End: 2019-04-16 | Stop reason: SDUPTHER

## 2019-04-16 RX ORDER — OXYCODONE HYDROCHLORIDE AND ACETAMINOPHEN 5; 325 MG/1; MG/1
2 TABLET ORAL EVERY 4 HOURS PRN
Status: DISCONTINUED | OUTPATIENT
Start: 2019-04-16 | End: 2019-04-16

## 2019-04-16 RX ORDER — FENTANYL CITRATE 50 UG/ML
25 INJECTION, SOLUTION INTRAMUSCULAR; INTRAVENOUS EVERY 5 MIN PRN
Status: DISCONTINUED | OUTPATIENT
Start: 2019-04-16 | End: 2019-04-16 | Stop reason: HOSPADM

## 2019-04-16 RX ORDER — SCOLOPAMINE TRANSDERMAL SYSTEM 1 MG/1
1 PATCH, EXTENDED RELEASE TRANSDERMAL
Status: DISCONTINUED | OUTPATIENT
Start: 2019-04-16 | End: 2019-04-18 | Stop reason: HOSPADM

## 2019-04-16 RX ORDER — CEFAZOLIN SODIUM 1 G/3ML
INJECTION, POWDER, FOR SOLUTION INTRAMUSCULAR; INTRAVENOUS PRN
Status: DISCONTINUED | OUTPATIENT
Start: 2019-04-16 | End: 2019-04-16 | Stop reason: SDUPTHER

## 2019-04-16 RX ORDER — ONDANSETRON 2 MG/ML
4 INJECTION INTRAMUSCULAR; INTRAVENOUS EVERY 6 HOURS PRN
Status: DISCONTINUED | OUTPATIENT
Start: 2019-04-16 | End: 2019-04-18 | Stop reason: HOSPADM

## 2019-04-16 RX ORDER — MIDAZOLAM HYDROCHLORIDE 1 MG/ML
2 INJECTION INTRAMUSCULAR; INTRAVENOUS
Status: CANCELLED | OUTPATIENT
Start: 2019-04-16 | End: 2019-04-16

## 2019-04-16 RX ORDER — HYDROCODONE BITARTRATE AND ACETAMINOPHEN 5; 325 MG/1; MG/1
2 TABLET ORAL EVERY 4 HOURS PRN
Status: DISCONTINUED | OUTPATIENT
Start: 2019-04-16 | End: 2019-04-18 | Stop reason: HOSPADM

## 2019-04-16 RX ORDER — SODIUM CHLORIDE 0.9 % (FLUSH) 0.9 %
10 SYRINGE (ML) INJECTION PRN
Status: DISCONTINUED | OUTPATIENT
Start: 2019-04-16 | End: 2019-04-16 | Stop reason: HOSPADM

## 2019-04-16 RX ORDER — DEXAMETHASONE SODIUM PHOSPHATE 10 MG/ML
INJECTION INTRAMUSCULAR; INTRAVENOUS PRN
Status: DISCONTINUED | OUTPATIENT
Start: 2019-04-16 | End: 2019-04-16 | Stop reason: SDUPTHER

## 2019-04-16 RX ORDER — SODIUM CHLORIDE, SODIUM LACTATE, POTASSIUM CHLORIDE, CALCIUM CHLORIDE 600; 310; 30; 20 MG/100ML; MG/100ML; MG/100ML; MG/100ML
INJECTION, SOLUTION INTRAVENOUS CONTINUOUS
Status: CANCELLED | OUTPATIENT
Start: 2019-04-16

## 2019-04-16 RX ORDER — HEPARIN SODIUM 5000 [USP'U]/ML
INJECTION, SOLUTION INTRAVENOUS; SUBCUTANEOUS
Status: COMPLETED
Start: 2019-04-16 | End: 2019-04-16

## 2019-04-16 RX ORDER — BUPIVACAINE HYDROCHLORIDE 5 MG/ML
INJECTION, SOLUTION EPIDURAL; INTRACAUDAL PRN
Status: DISCONTINUED | OUTPATIENT
Start: 2019-04-16 | End: 2019-04-16 | Stop reason: ALTCHOICE

## 2019-04-16 RX ORDER — ONDANSETRON 2 MG/ML
4 INJECTION INTRAMUSCULAR; INTRAVENOUS ONCE
Status: CANCELLED | OUTPATIENT
Start: 2019-04-16 | End: 2019-04-16

## 2019-04-16 RX ORDER — MAGNESIUM HYDROXIDE 1200 MG/15ML
LIQUID ORAL CONTINUOUS PRN
Status: COMPLETED | OUTPATIENT
Start: 2019-04-16 | End: 2019-04-16

## 2019-04-16 RX ORDER — LIDOCAINE HYDROCHLORIDE 10 MG/ML
1 INJECTION, SOLUTION EPIDURAL; INFILTRATION; INTRACAUDAL; PERINEURAL
Status: DISCONTINUED | OUTPATIENT
Start: 2019-04-16 | End: 2019-04-16 | Stop reason: HOSPADM

## 2019-04-16 RX ORDER — HEPARIN SODIUM 5000 [USP'U]/ML
5000 INJECTION, SOLUTION INTRAVENOUS; SUBCUTANEOUS ONCE
Status: COMPLETED | OUTPATIENT
Start: 2019-04-16 | End: 2019-04-16

## 2019-04-16 RX ORDER — SODIUM CHLORIDE 0.9 % (FLUSH) 0.9 %
10 SYRINGE (ML) INJECTION EVERY 12 HOURS SCHEDULED
Status: DISCONTINUED | OUTPATIENT
Start: 2019-04-16 | End: 2019-04-16 | Stop reason: HOSPADM

## 2019-04-16 RX ORDER — KETAMINE HYDROCHLORIDE 10 MG/ML
INJECTION, SOLUTION INTRAMUSCULAR; INTRAVENOUS PRN
Status: DISCONTINUED | OUTPATIENT
Start: 2019-04-16 | End: 2019-04-16 | Stop reason: SDUPTHER

## 2019-04-16 RX ORDER — LIDOCAINE HYDROCHLORIDE ANHYDROUS AND DEXTROSE MONOHYDRATE .4; 5 G/100ML; G/100ML
1 INJECTION, SOLUTION INTRAVENOUS CONTINUOUS
Status: DISCONTINUED | OUTPATIENT
Start: 2019-04-16 | End: 2019-04-16

## 2019-04-16 RX ORDER — LIDOCAINE HYDROCHLORIDE 10 MG/ML
INJECTION, SOLUTION EPIDURAL; INFILTRATION; INTRACAUDAL; PERINEURAL PRN
Status: DISCONTINUED | OUTPATIENT
Start: 2019-04-16 | End: 2019-04-16 | Stop reason: SDUPTHER

## 2019-04-16 RX ORDER — SODIUM CHLORIDE 0.9 % (FLUSH) 0.9 %
10 SYRINGE (ML) INJECTION PRN
Status: CANCELLED | OUTPATIENT
Start: 2019-04-16

## 2019-04-16 RX ORDER — SODIUM CHLORIDE, SODIUM LACTATE, POTASSIUM CHLORIDE, CALCIUM CHLORIDE 600; 310; 30; 20 MG/100ML; MG/100ML; MG/100ML; MG/100ML
INJECTION, SOLUTION INTRAVENOUS CONTINUOUS
Status: DISCONTINUED | OUTPATIENT
Start: 2019-04-16 | End: 2019-04-18 | Stop reason: HOSPADM

## 2019-04-16 RX ORDER — SODIUM CHLORIDE 0.9 % (FLUSH) 0.9 %
10 SYRINGE (ML) INJECTION EVERY 12 HOURS SCHEDULED
Status: CANCELLED | OUTPATIENT
Start: 2019-04-16

## 2019-04-16 RX ORDER — HEPARIN SODIUM 5000 [USP'U]/ML
5000 INJECTION, SOLUTION INTRAVENOUS; SUBCUTANEOUS EVERY 8 HOURS SCHEDULED
Status: DISCONTINUED | OUTPATIENT
Start: 2019-04-16 | End: 2019-04-18 | Stop reason: HOSPADM

## 2019-04-16 RX ORDER — KETOROLAC TROMETHAMINE 15 MG/ML
15 INJECTION, SOLUTION INTRAMUSCULAR; INTRAVENOUS ONCE
Status: COMPLETED | OUTPATIENT
Start: 2019-04-16 | End: 2019-04-16

## 2019-04-16 RX ORDER — SODIUM CHLORIDE 0.9 % (FLUSH) 0.9 %
10 SYRINGE (ML) INJECTION PRN
Status: DISCONTINUED | OUTPATIENT
Start: 2019-04-16 | End: 2019-04-18 | Stop reason: HOSPADM

## 2019-04-16 RX ORDER — ONDANSETRON 2 MG/ML
4 INJECTION INTRAMUSCULAR; INTRAVENOUS
Status: DISCONTINUED | OUTPATIENT
Start: 2019-04-16 | End: 2019-04-16 | Stop reason: HOSPADM

## 2019-04-16 RX ORDER — ROCURONIUM BROMIDE 10 MG/ML
INJECTION, SOLUTION INTRAVENOUS PRN
Status: DISCONTINUED | OUTPATIENT
Start: 2019-04-16 | End: 2019-04-16 | Stop reason: SDUPTHER

## 2019-04-16 RX ORDER — PROMETHAZINE HYDROCHLORIDE 12.5 MG/1
12.5 TABLET ORAL EVERY 6 HOURS PRN
Qty: 20 TABLET | Refills: 0 | Status: SHIPPED | OUTPATIENT
Start: 2019-04-16 | End: 2020-06-05

## 2019-04-16 RX ORDER — PROPOFOL 10 MG/ML
INJECTION, EMULSION INTRAVENOUS PRN
Status: DISCONTINUED | OUTPATIENT
Start: 2019-04-16 | End: 2019-04-16 | Stop reason: SDUPTHER

## 2019-04-16 RX ORDER — PROMETHAZINE HYDROCHLORIDE 25 MG/ML
12.5 INJECTION, SOLUTION INTRAMUSCULAR; INTRAVENOUS EVERY 6 HOURS PRN
Status: DISCONTINUED | OUTPATIENT
Start: 2019-04-16 | End: 2019-04-18 | Stop reason: HOSPADM

## 2019-04-16 RX ORDER — HYDROCODONE BITARTRATE AND ACETAMINOPHEN 5; 325 MG/1; MG/1
1 TABLET ORAL EVERY 4 HOURS PRN
Status: DISCONTINUED | OUTPATIENT
Start: 2019-04-16 | End: 2019-04-18 | Stop reason: HOSPADM

## 2019-04-16 RX ORDER — SODIUM CHLORIDE, SODIUM LACTATE, POTASSIUM CHLORIDE, CALCIUM CHLORIDE 600; 310; 30; 20 MG/100ML; MG/100ML; MG/100ML; MG/100ML
INJECTION, SOLUTION INTRAVENOUS CONTINUOUS
Status: DISCONTINUED | OUTPATIENT
Start: 2019-04-16 | End: 2019-04-16

## 2019-04-16 RX ORDER — SODIUM CHLORIDE 0.9 % (FLUSH) 0.9 %
10 SYRINGE (ML) INJECTION EVERY 12 HOURS SCHEDULED
Status: DISCONTINUED | OUTPATIENT
Start: 2019-04-16 | End: 2019-04-18 | Stop reason: HOSPADM

## 2019-04-16 RX ORDER — SODIUM CHLORIDE, SODIUM LACTATE, POTASSIUM CHLORIDE, CALCIUM CHLORIDE 600; 310; 30; 20 MG/100ML; MG/100ML; MG/100ML; MG/100ML
1000 INJECTION, SOLUTION INTRAVENOUS CONTINUOUS
Status: DISCONTINUED | OUTPATIENT
Start: 2019-04-16 | End: 2019-04-16

## 2019-04-16 RX ORDER — NEOSTIGMINE METHYLSULFATE 5 MG/5 ML
SYRINGE (ML) INTRAVENOUS PRN
Status: DISCONTINUED | OUTPATIENT
Start: 2019-04-16 | End: 2019-04-16 | Stop reason: SDUPTHER

## 2019-04-16 RX ORDER — DIPHENHYDRAMINE HYDROCHLORIDE 50 MG/ML
INJECTION INTRAMUSCULAR; INTRAVENOUS PRN
Status: DISCONTINUED | OUTPATIENT
Start: 2019-04-16 | End: 2019-04-16 | Stop reason: SDUPTHER

## 2019-04-16 RX ORDER — ONDANSETRON 2 MG/ML
INJECTION INTRAMUSCULAR; INTRAVENOUS PRN
Status: DISCONTINUED | OUTPATIENT
Start: 2019-04-16 | End: 2019-04-16 | Stop reason: SDUPTHER

## 2019-04-16 RX ORDER — OXYCODONE HYDROCHLORIDE AND ACETAMINOPHEN 5; 325 MG/1; MG/1
1 TABLET ORAL EVERY 4 HOURS PRN
Qty: 40 TABLET | Refills: 0 | Status: SHIPPED | OUTPATIENT
Start: 2019-04-16 | End: 2019-04-17 | Stop reason: HOSPADM

## 2019-04-16 RX ORDER — MIDAZOLAM HYDROCHLORIDE 1 MG/ML
INJECTION INTRAMUSCULAR; INTRAVENOUS PRN
Status: DISCONTINUED | OUTPATIENT
Start: 2019-04-16 | End: 2019-04-16 | Stop reason: SDUPTHER

## 2019-04-16 RX ORDER — OXYCODONE HYDROCHLORIDE AND ACETAMINOPHEN 5; 325 MG/1; MG/1
1 TABLET ORAL EVERY 4 HOURS PRN
Status: DISCONTINUED | OUTPATIENT
Start: 2019-04-16 | End: 2019-04-16

## 2019-04-16 RX ADMIN — PHENYLEPHRINE HYDROCHLORIDE 200 MCG: 10 INJECTION INTRAVENOUS at 09:12

## 2019-04-16 RX ADMIN — LIDOCAINE HYDROCHLORIDE: 10 INJECTION, SOLUTION EPIDURAL; INFILTRATION; INTRACAUDAL; PERINEURAL at 11:30

## 2019-04-16 RX ADMIN — FENTANYL CITRATE 50 MCG: 50 INJECTION, SOLUTION INTRAMUSCULAR; INTRAVENOUS at 11:33

## 2019-04-16 RX ADMIN — PHENYLEPHRINE HYDROCHLORIDE 100 MCG: 10 INJECTION INTRAVENOUS at 07:54

## 2019-04-16 RX ADMIN — PHENYLEPHRINE HYDROCHLORIDE 100 MCG: 10 INJECTION INTRAVENOUS at 07:51

## 2019-04-16 RX ADMIN — PHENYLEPHRINE HYDROCHLORIDE 100 MCG: 10 INJECTION INTRAVENOUS at 08:33

## 2019-04-16 RX ADMIN — SODIUM CHLORIDE, POTASSIUM CHLORIDE, SODIUM LACTATE AND CALCIUM CHLORIDE: 600; 310; 30; 20 INJECTION, SOLUTION INTRAVENOUS at 06:32

## 2019-04-16 RX ADMIN — PROMETHAZINE HYDROCHLORIDE 12.5 MG: 25 INJECTION INTRAMUSCULAR; INTRAVENOUS at 13:31

## 2019-04-16 RX ADMIN — PHENYLEPHRINE HYDROCHLORIDE 100 MCG: 10 INJECTION INTRAVENOUS at 08:18

## 2019-04-16 RX ADMIN — FAMOTIDINE 20 MG: 10 INJECTION, SOLUTION INTRAVENOUS at 13:38

## 2019-04-16 RX ADMIN — PHENYLEPHRINE HYDROCHLORIDE 200 MCG: 10 INJECTION INTRAVENOUS at 07:57

## 2019-04-16 RX ADMIN — Medication 2 G: at 07:29

## 2019-04-16 RX ADMIN — PHENYLEPHRINE HYDROCHLORIDE 200 MCG: 10 INJECTION INTRAVENOUS at 10:01

## 2019-04-16 RX ADMIN — PHENYLEPHRINE HYDROCHLORIDE 200 MCG: 10 INJECTION INTRAVENOUS at 08:39

## 2019-04-16 RX ADMIN — Medication 12.5 MG: at 08:03

## 2019-04-16 RX ADMIN — KETAMINE HYDROCHLORIDE 65 MG: 10 INJECTION INTRAMUSCULAR; INTRAVENOUS at 07:12

## 2019-04-16 RX ADMIN — KETOROLAC TROMETHAMINE 15 MG: 15 INJECTION, SOLUTION INTRAMUSCULAR; INTRAVENOUS at 13:51

## 2019-04-16 RX ADMIN — PHENYLEPHRINE HYDROCHLORIDE 200 MCG: 10 INJECTION INTRAVENOUS at 08:51

## 2019-04-16 RX ADMIN — HEPARIN SODIUM 5000 UNITS: 5000 INJECTION, SOLUTION INTRAVENOUS; SUBCUTANEOUS at 06:28

## 2019-04-16 RX ADMIN — ONDANSETRON 4 MG: 2 INJECTION, SOLUTION INTRAMUSCULAR; INTRAVENOUS at 10:07

## 2019-04-16 RX ADMIN — CEFAZOLIN 1000 MG: 330 INJECTION, POWDER, FOR SOLUTION INTRAMUSCULAR; INTRAVENOUS at 07:30

## 2019-04-16 RX ADMIN — PROMETHAZINE HYDROCHLORIDE 12.5 MG: 25 INJECTION INTRAMUSCULAR; INTRAVENOUS at 21:24

## 2019-04-16 RX ADMIN — SODIUM CHLORIDE, POTASSIUM CHLORIDE, SODIUM LACTATE AND CALCIUM CHLORIDE: 600; 310; 30; 20 INJECTION, SOLUTION INTRAVENOUS at 08:54

## 2019-04-16 RX ADMIN — MIDAZOLAM HYDROCHLORIDE 2 MG: 1 INJECTION, SOLUTION INTRAMUSCULAR; INTRAVENOUS at 07:10

## 2019-04-16 RX ADMIN — Medication 3 G: at 13:52

## 2019-04-16 RX ADMIN — ROCURONIUM BROMIDE 50 MG: 10 INJECTION INTRAVENOUS at 07:12

## 2019-04-16 RX ADMIN — DEXAMETHASONE SODIUM PHOSPHATE 10 MG: 10 INJECTION INTRAMUSCULAR; INTRAVENOUS at 07:38

## 2019-04-16 RX ADMIN — Medication 3 G: at 21:24

## 2019-04-16 RX ADMIN — HYDROMORPHONE HYDROCHLORIDE 0.5 MG: 1 INJECTION, SOLUTION INTRAMUSCULAR; INTRAVENOUS; SUBCUTANEOUS at 13:30

## 2019-04-16 RX ADMIN — FENTANYL CITRATE 50 MCG: 50 INJECTION, SOLUTION INTRAMUSCULAR; INTRAVENOUS at 11:45

## 2019-04-16 RX ADMIN — FENTANYL CITRATE 50 MCG: 50 INJECTION, SOLUTION INTRAMUSCULAR; INTRAVENOUS at 10:55

## 2019-04-16 RX ADMIN — PHENYLEPHRINE HYDROCHLORIDE 200 MCG: 10 INJECTION INTRAVENOUS at 09:43

## 2019-04-16 RX ADMIN — HYDROMORPHONE HYDROCHLORIDE 1 MG: 1 INJECTION, SOLUTION INTRAMUSCULAR; INTRAVENOUS; SUBCUTANEOUS at 21:24

## 2019-04-16 RX ADMIN — LIDOCAINE HYDROCHLORIDE 30 ML/HR: 10 INJECTION, SOLUTION EPIDURAL; INFILTRATION; INTRACAUDAL; PERINEURAL at 07:22

## 2019-04-16 RX ADMIN — ROCURONIUM BROMIDE 10 MG: 10 INJECTION INTRAVENOUS at 09:28

## 2019-04-16 RX ADMIN — METRONIDAZOLE 500 MG: 500 INJECTION, SOLUTION INTRAVENOUS at 07:33

## 2019-04-16 RX ADMIN — ONDANSETRON 4 MG: 2 INJECTION INTRAMUSCULAR; INTRAVENOUS at 17:10

## 2019-04-16 RX ADMIN — HEPARIN SODIUM 5000 UNITS: 5000 INJECTION INTRAVENOUS; SUBCUTANEOUS at 21:25

## 2019-04-16 RX ADMIN — PROPOFOL 200 MG: 10 INJECTION, EMULSION INTRAVENOUS at 07:12

## 2019-04-16 RX ADMIN — ROCURONIUM BROMIDE 10 MG: 10 INJECTION INTRAVENOUS at 08:46

## 2019-04-16 RX ADMIN — PROPOFOL 100 MG: 10 INJECTION, EMULSION INTRAVENOUS at 07:14

## 2019-04-16 RX ADMIN — PHENYLEPHRINE HYDROCHLORIDE 200 MCG: 10 INJECTION INTRAVENOUS at 08:09

## 2019-04-16 RX ADMIN — HEPARIN SODIUM 5000 UNITS: 5000 INJECTION INTRAVENOUS; SUBCUTANEOUS at 06:28

## 2019-04-16 RX ADMIN — LIDOCAINE HYDROCHLORIDE 50 MG: 10 INJECTION, SOLUTION EPIDURAL; INFILTRATION; INTRACAUDAL; PERINEURAL at 07:12

## 2019-04-16 RX ADMIN — FENTANYL CITRATE 50 MCG: 50 INJECTION, SOLUTION INTRAMUSCULAR; INTRAVENOUS at 11:10

## 2019-04-16 RX ADMIN — HYDROMORPHONE HYDROCHLORIDE 1 MG: 1 INJECTION, SOLUTION INTRAMUSCULAR; INTRAVENOUS; SUBCUTANEOUS at 17:11

## 2019-04-16 RX ADMIN — Medication 5 MG: at 10:08

## 2019-04-16 RX ADMIN — ROCURONIUM BROMIDE 10 MG: 10 INJECTION INTRAVENOUS at 08:08

## 2019-04-16 RX ADMIN — FAMOTIDINE 20 MG: 10 INJECTION, SOLUTION INTRAVENOUS at 21:25

## 2019-04-16 RX ADMIN — Medication 0.8 MG: at 10:08

## 2019-04-16 RX ADMIN — KETAMINE HYDROCHLORIDE 5 MCG/KG/MIN: 50 INJECTION INTRAMUSCULAR; INTRAVENOUS at 07:22

## 2019-04-16 ASSESSMENT — PAIN SCALES - GENERAL
PAINLEVEL_OUTOF10: 10
PAINLEVEL_OUTOF10: 9
PAINLEVEL_OUTOF10: 10
PAINLEVEL_OUTOF10: 5
PAINLEVEL_OUTOF10: 10
PAINLEVEL_OUTOF10: 6
PAINLEVEL_OUTOF10: 4
PAINLEVEL_OUTOF10: 7
PAINLEVEL_OUTOF10: 5
PAINLEVEL_OUTOF10: 10

## 2019-04-16 ASSESSMENT — PULMONARY FUNCTION TESTS
PIF_VALUE: 32
PIF_VALUE: 24
PIF_VALUE: 32
PIF_VALUE: 23
PIF_VALUE: 32
PIF_VALUE: 31
PIF_VALUE: 32
PIF_VALUE: 33
PIF_VALUE: 23
PIF_VALUE: 33
PIF_VALUE: 13
PIF_VALUE: 31
PIF_VALUE: 33
PIF_VALUE: 30
PIF_VALUE: 21
PIF_VALUE: 31
PIF_VALUE: 32
PIF_VALUE: 32
PIF_VALUE: 24
PIF_VALUE: 36
PIF_VALUE: 5
PIF_VALUE: 32
PIF_VALUE: 32
PIF_VALUE: 27
PIF_VALUE: 23
PIF_VALUE: 23
PIF_VALUE: 32
PIF_VALUE: 30
PIF_VALUE: 3
PIF_VALUE: 32
PIF_VALUE: 23
PIF_VALUE: 35
PIF_VALUE: 32
PIF_VALUE: 33
PIF_VALUE: 7
PIF_VALUE: 33
PIF_VALUE: 33
PIF_VALUE: 32
PIF_VALUE: 33
PIF_VALUE: 32
PIF_VALUE: 34
PIF_VALUE: 32
PIF_VALUE: 32
PIF_VALUE: 33
PIF_VALUE: 31
PIF_VALUE: 25
PIF_VALUE: 33
PIF_VALUE: 32
PIF_VALUE: 32
PIF_VALUE: 31
PIF_VALUE: 17
PIF_VALUE: 34
PIF_VALUE: 33
PIF_VALUE: 32
PIF_VALUE: 31
PIF_VALUE: 31
PIF_VALUE: 23
PIF_VALUE: 33
PIF_VALUE: 32
PIF_VALUE: 34
PIF_VALUE: 33
PIF_VALUE: 32
PIF_VALUE: 33
PIF_VALUE: 32
PIF_VALUE: 34
PIF_VALUE: 32
PIF_VALUE: 15
PIF_VALUE: 22
PIF_VALUE: 32
PIF_VALUE: 33
PIF_VALUE: 24
PIF_VALUE: 34
PIF_VALUE: 32
PIF_VALUE: 24
PIF_VALUE: 2
PIF_VALUE: 32
PIF_VALUE: 32
PIF_VALUE: 24
PIF_VALUE: 13
PIF_VALUE: 33
PIF_VALUE: 25
PIF_VALUE: 26
PIF_VALUE: 30
PIF_VALUE: 32
PIF_VALUE: 33
PIF_VALUE: 33
PIF_VALUE: 16
PIF_VALUE: 23
PIF_VALUE: 33
PIF_VALUE: 32
PIF_VALUE: 32
PIF_VALUE: 31
PIF_VALUE: 32
PIF_VALUE: 0
PIF_VALUE: 32
PIF_VALUE: 32
PIF_VALUE: 24
PIF_VALUE: 32
PIF_VALUE: 24
PIF_VALUE: 31
PIF_VALUE: 32
PIF_VALUE: 33
PIF_VALUE: 33
PIF_VALUE: 32
PIF_VALUE: 23
PIF_VALUE: 33
PIF_VALUE: 4
PIF_VALUE: 33
PIF_VALUE: 5
PIF_VALUE: 33
PIF_VALUE: 23
PIF_VALUE: 32
PIF_VALUE: 32
PIF_VALUE: 31
PIF_VALUE: 30
PIF_VALUE: 24
PIF_VALUE: 32
PIF_VALUE: 32
PIF_VALUE: 33
PIF_VALUE: 16
PIF_VALUE: 31
PIF_VALUE: 33
PIF_VALUE: 32
PIF_VALUE: 23
PIF_VALUE: 32
PIF_VALUE: 33
PIF_VALUE: 32
PIF_VALUE: 31
PIF_VALUE: 34
PIF_VALUE: 25
PIF_VALUE: 33
PIF_VALUE: 31
PIF_VALUE: 32
PIF_VALUE: 33
PIF_VALUE: 35
PIF_VALUE: 23
PIF_VALUE: 22
PIF_VALUE: 10
PIF_VALUE: 32
PIF_VALUE: 34
PIF_VALUE: 32
PIF_VALUE: 33
PIF_VALUE: 32
PIF_VALUE: 23
PIF_VALUE: 32
PIF_VALUE: 25
PIF_VALUE: 17
PIF_VALUE: 5
PIF_VALUE: 33
PIF_VALUE: 31
PIF_VALUE: 28
PIF_VALUE: 24
PIF_VALUE: 23
PIF_VALUE: 29
PIF_VALUE: 34
PIF_VALUE: 23
PIF_VALUE: 31
PIF_VALUE: 23
PIF_VALUE: 24
PIF_VALUE: 24
PIF_VALUE: 31
PIF_VALUE: 23
PIF_VALUE: 32
PIF_VALUE: 24
PIF_VALUE: 23
PIF_VALUE: 33
PIF_VALUE: 24
PIF_VALUE: 32
PIF_VALUE: 33
PIF_VALUE: 32
PIF_VALUE: 30
PIF_VALUE: 33
PIF_VALUE: 32
PIF_VALUE: 35
PIF_VALUE: 23
PIF_VALUE: 32
PIF_VALUE: 35
PIF_VALUE: 7
PIF_VALUE: 32
PIF_VALUE: 30
PIF_VALUE: 32
PIF_VALUE: 23
PIF_VALUE: 32
PIF_VALUE: 32
PIF_VALUE: 24
PIF_VALUE: 32
PIF_VALUE: 27
PIF_VALUE: 32
PIF_VALUE: 33
PIF_VALUE: 32
PIF_VALUE: 32

## 2019-04-16 ASSESSMENT — ENCOUNTER SYMPTOMS: SHORTNESS OF BREATH: 0

## 2019-04-16 ASSESSMENT — PAIN DESCRIPTION - LOCATION
LOCATION: ABDOMEN
LOCATION: ABDOMEN

## 2019-04-16 ASSESSMENT — PAIN - FUNCTIONAL ASSESSMENT: PAIN_FUNCTIONAL_ASSESSMENT: 0-10

## 2019-04-16 ASSESSMENT — PAIN DESCRIPTION - DESCRIPTORS
DESCRIPTORS: PRESSURE
DESCRIPTORS: CONSTANT

## 2019-04-16 ASSESSMENT — LIFESTYLE VARIABLES: SMOKING_STATUS: 0

## 2019-04-16 ASSESSMENT — PAIN DESCRIPTION - PAIN TYPE
TYPE: SURGICAL PAIN
TYPE: ACUTE PAIN;SURGICAL PAIN

## 2019-04-16 ASSESSMENT — PAIN DESCRIPTION - ORIENTATION: ORIENTATION: ANTERIOR

## 2019-04-16 ASSESSMENT — PAIN DESCRIPTION - FREQUENCY
FREQUENCY: CONTINUOUS
FREQUENCY: CONTINUOUS

## 2019-04-16 ASSESSMENT — PAIN DESCRIPTION - ONSET
ONSET: ON-GOING
ONSET: ON-GOING

## 2019-04-16 NOTE — CARE COORDINATION
Case Management Initial Discharge Plan  Imelda Darrius Susan,             Met with:patient to discuss discharge plans. Information verified: address, contacts, phone number, , insurance Yes  PCP: Minnie Delgadillo MD  Date of last visit:     Insurance Provider: [employer] [facility]    Discharge Planning    Living Arrangements:  Spouse/Significant Other   Support Systems:  Family Members, Spouse/Significant Other    Home has 1 stories  4 stairs to climb to get into front door, 0stairs to climb to reach second floor  Location of bedroom/bathroom in home     Patient able to perform ADL's:Independent    Current Services (outpatient & in home)   DME equipment:   DME provider:     Pharmacy: [facility] Assistance Purchasing Medications:  No  Does patient want to participate in local refill/ meds to beds program?  Yes    Potential Assistance Needed:  N/A    Patient agreeable to home care: No  [place] of choice provided:  n/a    Prior SNF/Rehab Placement and Facility:   Agreeable to SNF/Rehab: No  [place] of choice provided: n/a   Evaluation: n/a    Expected Discharge date:  19  Patient expects to be discharged to: Independent  Follow Up Appointment: Best Day/ Time: Monday AM    Transportation provider: Family  Transportation arrangements needed for discharge: No    Readmission Risk              Risk of Unplanned Readmission:        7             Does patient have a readmission risk score greater than 14?: No  If yes, follow-up appointment must be made within 7 days of discharge.      Discharge Plan: Home independent with spouse          Electronically signed by Sarah Will RN on 19 at 2:49 PM

## 2019-04-16 NOTE — OP NOTE
4/16/2019     Procedure:  1.  Laparoscopic Heaven-en-Y gastric bypass - Robotic Assisted  2.  Upper GI endoscopy     Preoperative diagnoses:  Obstructive Sleep Apnea  GERD  Morbid Obesity, BMI 47 kg/m2     Postoperative diagnoses:  Same     Anesthesia: General     Surgeon: Anjum Yanes     Assistant:  Pablo Porter DO     Estimated blood loss:  17 cc     Preoperative medications: Heparin, SCD's, Ancef/Flagyl     Indications for procedure:     The patient is a pleasant 38 y. o. female with morbid obesity. Florence Griffiths have a BMI of Body mass index is 47 kg/m².  They've completed medical risk stratification and are scheduled for a Heaven-en-Y gastric bypass.     Consent: The patient was seen and evaluated in the office setting where the procedure was explained to the patient and all questions were answered.  Discussion was held between Dr. Oumou Lennon and the patient. Hollace Duverney alternatives to the proposed procedure including but not limited to medical observation under the care of a physician exercise programs and other weight reductive operative procedures were explained to the patient.  Risks of the proposed procedure including but not limited to hemorrhage requiring transfusion, infection, nerve injury, conversion to open procedure, need for further surgery, anastomotic disruption or leak, anastomotic stricture, pneumonia, pulmonary embolus, airway complications and death were explained to the patient.  The patient understands the above alternatives and risks and has electively chosen laparoscopic Heaven-en-Y gastric bypass and wishes to proceed with the procedure.     Description of procedure:   The patient was taken to the operating room and placed in supine position.  After successful induction of general endotracheal anesthesia by the anesthesia department a Paez catheter and orogastric tube was placed to decompress the bladder and stomach respectively.  The patient was placed in split leg lithotomy position and care was taken to pad the exposed extremities.  Then was prepped and draped in normal sterile fashion.  A 12 mm Optiview trocar was placed in the left sub-costal position in the midclavicular line and access to the abdominal cavity was obtained under visualization.  Pneumoperitoneum was then established without complications.  After evaluation of the abdominal contents from this trocar position 5 other ports were placed under direct visualization.  One at the umbilicus, one 2-3 cm above the umbilicus, one at the subxiphoid area, one in the right subcostal margin in the midclavicular line, and the last one further lateral to the original Optiview port.  A solution of 0.5% Marcaine was used to infiltrate the subcutaneous tissues prior to trocar placement.  The robot docked. Jac Cerise was no hiatal hernia.      After decompression of the stomach with the orogastric tube, the orogastric tube and any esophageal probes were removed from the patient.  This was reviewed with the anesthesia team.  The stomach was grasped using a forceps and retracted caudally to expose the phrenoesophageal ligament.  This was taken down with hook cautery.  A blunt palpation probe was used to expose the esophagus and left bundle of the right alistair.  At this point we released our retraction on the stomach and measured for 5-7 cm from the angle of His along the lesser curvature.  We made a window along the lesser curvature through the pars flaccida with the Vessel Sealer and began the dissection.  This allowed entry into the lesser sac.  Once the lesser sac was entered a 60 mm x 2.5 mm KAYLA stapler was used to come across the neurovascular bundle.  A 60 mm x 3.5 mm stapler then passed across the stomach and fired.  60 mm × 3.5 mm staple loads were used to progress cephalad toward the angle of His.   A 30 mL gastric pouch was created.  After this was done the Orvil anvil for the EEA stapler was placed transorally.  This anvil was attached to an [address] [place] [address] orogastric tube and was placed by anesthesia without complications.  The orogastric tube was seen in the gastric pouch.  The scissors was used to make a gastrotomy in the gastric pouch and the orogastric tube was pulled out of the gastric pouch.  At this point the orogastric tube was detached from the anvil and removed, leaving the anvil in the gastric pouch.     Having successfully completed our small gastric pouch and anvil placement, we turned our attention to dividing the greater omentum.  This was accomplished with the Vessel Sealer.  After this we identified the ligament of Treitz and the inferior mesenteric vein, and went 60 cm distal.  A 3-0 silk suture was then placed to ozzy the proximal bowel and then we divided the bowel using a 60 mm × 2.5 mm KAYLA stapler.  The mesentery was also transected using a Vessel Sealer.     After this was done we brought the efferent limb all the way up to the gastric pouch in between our previously created defect in the greater omentum.  The staple line and the jejunum was opened using cautery and after enlarging the lateral trocar site the 25 mm EEA stapler was introduced into the abdomen and into the open jejunum.  Using the EEA stapler we performed our gastrojejunostomy.  The stapler was removed and 2 complete donuts were identified.  The open ended jejunum was closed using a 60 mm x 2.5 mm KAYLA stapler and bowel continuity reestablished.  This amputated piece of jejunum was placed in an Endopouch and removed from the abdomen.  Of note the mesentery was thick and heavy.     We then measured distal on the small bowel and oriented our bowel 125 cm to create the jejunojejunostomy.   I could not go further as the mesentery became very heavy.  An enterotomy was created in each limb with the Harmonic scalpel and using the triple stapling technique created a side-to-side jejunojejunostomy.  60 mm x 2.5 mm staple loads were utilized with 45 mm of the staple load.  The opening was evaluated for hemostasis and care was taken to make sure the posterior wall of the anastomosis was free.  This was directly visualized under laparoscopic visualization.  The remaining free defect was closed with one or more firings of a 60 mm x 2.5 mm linear stapler.  Numerous sutures of 3-0 Silk sutures were used to close the defect of the mesentery at the jejunojejunostomy.  The piece of small bowel from the triple staple technique was then withdrawn from the abdomen.  The staple line was noted to be intact and to have captured serosa the entire length of the anastomosis.       Having restored bowel continuity we placed multiple interrupted sutures to reinforce the gastrojejunal anastomosis.  Interrupted 3-0 Vicryl sutures were placed laterally, posterior laterally and anteriorly so that we could circumferentially reinforce the anastomosis.  The bowel was pink and viable, and there was no tension on the anastomosis.  Having completed our reinforced sutures we then proceeded with checking the anastomosis for leakage.     After completing the gastrojejunostomy, upper GI endoscopy was performed in order to evaluate the integrity of the anastomosis.  The Olympus gastroscope was introduced through the mouth, through the esophagus and into the small gastric pouch.  The anastomosis was noted to be patent widely.  The lateral gastric staple line and gastrojejunal anastomosis were hemostatic. The scope passed through the anastomosis into the jejunum into both limbs without obstruction.  The anastomosis was submerged under irrigation placed in the abdomen.  There was no evidence of air extravasation on the intraoperative operative leak test performed.  The air was evacuated and the scope removed from the patient.  A 19 perforated Sunny drain was left posterior to the anastomosis and brought out through the right upper quadrant incision.  This was secured with a 2-0 silk suture.       The abdomen was then copiously irrigated and checked for hemostasis.  The effluent was evacuated from the abdomen and then we then turned our attention to closure of the trocar sites.  At this point prior to evacuation of the pneumoperitoneum we closed all our 12 mm ports with laparoscopic suture fascial closure device using 0 Vicryl.       The pneumoperitoneum was then evacuated.  The wounds were irrigated and found to be hemostatic.  The skin was closed using 4-0 Vicryl in a running subcuticular fashion.  Steri-Strips were applied to the wounds the patient was awakened from anesthesia extubated and taken to recovery in stable condition.     The patient and her  updated to all operative findings.

## 2019-04-16 NOTE — PROGRESS NOTES
Pt returned from  Surgery half asleep pt states pain 10/10  Attempted to walk pt per pt s request pt too unsteady to walk pt put back to bed. Pts mom here 115pm and stated pt in pain  pts mom following writer from room to room waiting for meds to be verified.  Meds verified pt given phenergan dilaudid pepcid toradol pt now sound asleep in chair

## 2019-04-16 NOTE — PROGRESS NOTES
Percocet PRN. 4. Nausea control with Zofran and Phenergan PRN  5. Encourage ambulation and IS usages. 6. DVT prophylaxis with Heparin TID starting at 10 PM.  7. GI prophylaxis with Pepcid. 8. Plan for esophagogram on POD#1.   9. Will re-assess in AM.    Electronically signed by Dougie Trinidad DO  on 4/16/2019 at 2:04 PM

## 2019-04-16 NOTE — PROGRESS NOTES
Smoking Cessation - topics covered   []  Health Risks  []  Benefits of Quitting   []  Smoking Cessation  [x]  Patient has no history of tobacco use  []  Patient is former smoker. [x]  No need for tobacco cessation education. []  Booklet given  []  Patient verbalizes understanding. []  Patient denies need for tobacco cessation education. []  Unable to meet with patient today. Will follow up as able.   Debora Hua  1:12 PM

## 2019-04-16 NOTE — BRIEF OP NOTE
Brief Postoperative Note  ______________________________________________________________    Patient: Lauren Maurer  YOB: 1980  MRN: [id number]  Date of Procedure: 4/16/2019    Pre-Op Diagnosis: MORBID OBESITY, OBSTRUCTIVE SLEEP APNEA, GERD    Post-Op Diagnosis: Same       Procedure(s):  XI ROBOTIC LAPAROSCOPIC GASTRIC BYPASS BRIAN-EN-Y, LIVER BIOPSY, EGD - GI SCHEDULED    Anesthesia: Regional, General    Surgeon(s):  Mary Mckenna DO    Assistant: Josias Velasquez, PGY-5    Estimated Blood Loss (mL): 53JF    Complications: None    Specimens:   * No specimens in log *    Implants:  * No implants in log *      Drains:   [REMOVED] Urethral Catheter Double-lumen;Straight-tip; Non-latex 16 fr (Removed)       Findings: neg air leak test with EGD at [address] [place] [address] anastomosis.     Josias Velasquez DO  Date: 4/16/2019  Time: 9:57 AM

## 2019-04-16 NOTE — ANESTHESIA POSTPROCEDURE EVALUATION
Department of Anesthesiology  Postprocedure Note    Patient: Tanner Parada  MRN: [id number]  YOB: 1980  Date of evaluation: 4/16/2019  Time:  12:40 PM     Procedure Summary     Date:  04/16/19 Room / Location:  [address] OR    Anesthesia Start:  0708 Anesthesia Stop:  0537    Procedure:  XI ROBOTIC LAPAROSCOPIC GASTRIC BYPASS BRIAN-EN-Y, LIVER BIOPSY, EGD - GI SCHEDULED (N/A Abdomen) Diagnosis:  (MORBID OBESITY, OBSTRUCTIVE SLEEP APNEA, GERD)    Surgeon:  Orrie Severs, DO Responsible Provider:  Chito Keita MD    Anesthesia Type:  general, regional ASA Status:  3          Anesthesia Type: general, regional    Bambi Phase I: Bambi Score: 9    Bambi Phase II:      Last vitals: Reviewed and per EMR flowsheets.        Anesthesia Post Evaluation    Patient location during evaluation: PACU  Patient participation: complete - patient participated  Level of consciousness: awake  Pain score: 3  Airway patency: patent  Nausea & Vomiting: no vomiting and no nausea  Complications: no  Cardiovascular status: blood pressure returned to baseline  Respiratory status: acceptable  Hydration status: euvolemic

## 2019-04-16 NOTE — ANESTHESIA PRE PROCEDURE
Department of Anesthesiology  Preprocedure Note       Name:  Ez Porter   Age:  45 y.o.  :  1980                                          MRN:  [id number]         Date:  2019      Surgeon: Ana Arndt):  Jackie Brenner DO    Procedure: XI ROBOTIC LAPAROSCOPIC GASTRIC BYPASS BRIAN-EN-Y, LIVER BIOPSY, EGD - GI SCHEDULED (N/A )    Medications prior to admission:   Prior to Admission medications    Medication Sig Start Date End Date Taking? Authorizing Provider   vitamin D (ERGOCALCIFEROL) 74115 units CAPS capsule Take 1 capsule by mouth once a week for 8 doses 19  MATTI Villa - CNP   cyclobenzaprine (FLEXERIL) 10 MG tablet 1 tablet as needed 17   Historical Provider, MD   meloxicam (MOBIC) 15 MG tablet 1 tablet(s) 12   Historical Provider, MD   ALPRAZolam [facility]) 0.5 MG tablet 1 tablet as needed 7/15/15   Historical Provider, MD       Current medications:    No current facility-administered medications for this visit. No current outpatient medications on file.      Facility-Administered Medications Ordered in Other Visits   Medication Dose Route Frequency Provider Last Rate Last Dose    ceFAZolin (ANCEF) 3 g in dextrose 5 % 100 mL IVPB  3 g Intravenous Once Jackie Brenner DO        metronidazole (FLAGYL) 500 mg in NaCl 100 mL IVPB premix  500 mg Intravenous Once Jackie Brenner DO        lactated ringers infusion   Intravenous Continuous Wagner Mercado  mL/hr at 19 9123      sodium chloride flush 0.9 % injection 10 mL  10 mL Intravenous 2 times per day Wagner Mercado MD        sodium chloride flush 0.9 % injection 10 mL  10 mL Intravenous PRN Wagner Mercado MD        lidocaine PF 1 % injection 1 mL  1 mL Intradermal Once PRN Ivelisse Campuzano MD        fentaNYL (SUBLIMAZE) injection 25 mcg  25 mcg Intravenous Q5 Min PRN Ivelisse Campuzano MD        midazolam (VERSED) injection 1 mg  1 mg Intravenous Q10 Min PRN Wagner Mercado MD  lactated ringers infusion 1,000 mL  1,000 mL Intravenous Continuous Dariana Rollins MD        lidocaine (CARDIAC INFUSION) 2 g in dextrose 5% 500 mL infusion  1 mg/min Intravenous Continuous Bree Barakat DO        ketamine (KETALAR) 100 mg in dextrose 5 % 100 mL infusion  2 mcg/kg/min Intravenous Continuous Bree Barakat DO           Allergies:  No Known Allergies    Problem List:    Patient Active Problem List   Diagnosis Code    GERD without esophagitis K21.9    History of depression Z86.59    Obesity, Class III, BMI 40-49.9 (morbid obesity) ([address] E66.01    Chronic back pain M54.9, G89.29    CURLY (obstructive sleep apnea) G47.33    Vitamin D deficiency E55.9       Past Medical History:        Diagnosis Date    Back pain     Depression     Migraines     Sleep apnea        Past Surgical History:        Procedure Laterality Date    HYSTERECTOMY      PARTIAL HYSTERECTOMY      UPPER GASTROINTESTINAL ENDOSCOPY N/A 11/23/2018    EGD BIOPSY performed by Bree Barakat DO at [facility]       Social History:    Social History     Tobacco Use    Smoking status: Never Smoker    Smokeless tobacco: Never Used   Substance Use Topics    Alcohol use: Yes                                Counseling given: Not Answered      Vital Signs (Current): There were no vitals filed for this visit.                                            BP Readings from Last 3 Encounters:   04/16/19 (!) 123/57   04/02/19 115/71   01/18/19 126/72       NPO Status:                                                                                 BMI:   Wt Readings from Last 3 Encounters:   04/16/19 288 lb (130.6 kg)   04/02/19 291 lb 0.1 oz (132 kg)   02/19/19 280 lb (127 kg)     There is no height or weight on file to calculate BMI.    CBC:   Lab Results   Component Value Date    WBC 7.0 04/02/2019    RBC 4.47 04/02/2019    HGB 13.2 04/02/2019    HCT 39.4 04/02/2019    MCV 88.1 04/02/2019    RDW 12.0 04/02/2019    PLT

## 2019-04-16 NOTE — PROGRESS NOTES
Pt  Instructed on how to use incentive spirometer and to use 10 times every hr  And that she will use it at home also pt returned demo to writer  and  Verbalized understanding   Pt also instructed how to empty drain and stripe drain pt verbalized understanding pt ambulated in the hallway with writer

## 2019-04-16 NOTE — PROGRESS NOTES
Pt states percocet makes her vomit pt states has taken norco before  Dr Barrie Mooney and norco ordered

## 2019-04-17 ENCOUNTER — APPOINTMENT (OUTPATIENT)
Dept: GENERAL RADIOLOGY | Age: 39
DRG: 621 | End: 2019-04-17
Attending: SURGERY
Payer: COMMERCIAL

## 2019-04-17 LAB
ANION GAP SERPL CALCULATED.3IONS-SCNC: 8 MMOL/L (ref 9–17)
BUN BLDV-MCNC: 9 MG/DL (ref 6–20)
BUN/CREAT BLD: ABNORMAL (ref 9–20)
CALCIUM SERPL-MCNC: 8.6 MG/DL (ref 8.6–10.4)
CHLORIDE BLD-SCNC: 103 MMOL/L (ref 98–107)
CO2: 25 MMOL/L (ref 20–31)
CREAT SERPL-MCNC: 0.48 MG/DL (ref 0.5–0.9)
GFR AFRICAN AMERICAN: >60 ML/MIN
GFR NON-AFRICAN AMERICAN: >60 ML/MIN
GFR SERPL CREATININE-BSD FRML MDRD: ABNORMAL ML/MIN/{1.73_M2}
GFR SERPL CREATININE-BSD FRML MDRD: ABNORMAL ML/MIN/{1.73_M2}
GLUCOSE BLD-MCNC: 132 MG/DL (ref 70–99)
HCT VFR BLD CALC: 35 % (ref 36.3–47.1)
HEMOGLOBIN: 11.5 G/DL (ref 11.9–15.1)
MCH RBC QN AUTO: 30 PG (ref 25.2–33.5)
MCHC RBC AUTO-ENTMCNC: 32.9 G/DL (ref 28.4–34.8)
MCV RBC AUTO: 91.4 FL (ref 82.6–102.9)
NRBC AUTOMATED: 0 PER 100 WBC
PDW BLD-RTO: 12.2 % (ref 11.8–14.4)
PLATELET # BLD: 227 K/UL (ref 138–453)
PMV BLD AUTO: 10.4 FL (ref 8.1–13.5)
POTASSIUM SERPL-SCNC: 4.3 MMOL/L (ref 3.7–5.3)
RBC # BLD: 3.83 M/UL (ref 3.95–5.11)
SODIUM BLD-SCNC: 136 MMOL/L (ref 135–144)
WBC # BLD: 11.4 K/UL (ref 3.5–11.3)

## 2019-04-17 PROCEDURE — 74220 X-RAY XM ESOPHAGUS 1CNTRST: CPT

## 2019-04-17 PROCEDURE — 6360000004 HC RX CONTRAST MEDICATION: Performed by: SURGERY

## 2019-04-17 PROCEDURE — 6370000000 HC RX 637 (ALT 250 FOR IP): Performed by: SURGERY

## 2019-04-17 PROCEDURE — 6360000002 HC RX W HCPCS: Performed by: SURGERY

## 2019-04-17 PROCEDURE — 2500000003 HC RX 250 WO HCPCS: Performed by: SURGERY

## 2019-04-17 PROCEDURE — 2580000003 HC RX 258: Performed by: SURGERY

## 2019-04-17 PROCEDURE — 36415 COLL VENOUS BLD VENIPUNCTURE: CPT

## 2019-04-17 PROCEDURE — 80048 BASIC METABOLIC PNL TOTAL CA: CPT

## 2019-04-17 PROCEDURE — BD11ZZZ FLUOROSCOPY OF ESOPHAGUS: ICD-10-PCS | Performed by: RADIOLOGY

## 2019-04-17 PROCEDURE — 1200000000 HC SEMI PRIVATE

## 2019-04-17 PROCEDURE — 85027 COMPLETE CBC AUTOMATED: CPT

## 2019-04-17 PROCEDURE — 94762 N-INVAS EAR/PLS OXIMTRY CONT: CPT

## 2019-04-17 RX ORDER — HYDROCODONE BITARTRATE AND ACETAMINOPHEN 5; 325 MG/1; MG/1
1 TABLET ORAL EVERY 6 HOURS PRN
Qty: 40 TABLET | Refills: 0 | Status: SHIPPED | OUTPATIENT
Start: 2019-04-17 | End: 2019-04-24

## 2019-04-17 RX ADMIN — SODIUM CHLORIDE, POTASSIUM CHLORIDE, SODIUM LACTATE AND CALCIUM CHLORIDE: 600; 310; 30; 20 INJECTION, SOLUTION INTRAVENOUS at 13:38

## 2019-04-17 RX ADMIN — HYDROMORPHONE HYDROCHLORIDE 1 MG: 1 INJECTION, SOLUTION INTRAMUSCULAR; INTRAVENOUS; SUBCUTANEOUS at 05:25

## 2019-04-17 RX ADMIN — IOHEXOL 30 ML: 240 INJECTION, SOLUTION INTRATHECAL; INTRAVASCULAR; INTRAVENOUS; ORAL at 09:20

## 2019-04-17 RX ADMIN — HYDROCODONE BITARTRATE AND ACETAMINOPHEN 2 TABLET: 5; 325 TABLET ORAL at 22:03

## 2019-04-17 RX ADMIN — HEPARIN SODIUM 5000 UNITS: 5000 INJECTION INTRAVENOUS; SUBCUTANEOUS at 06:30

## 2019-04-17 RX ADMIN — ONDANSETRON 4 MG: 2 INJECTION INTRAMUSCULAR; INTRAVENOUS at 02:28

## 2019-04-17 RX ADMIN — HEPARIN SODIUM 5000 UNITS: 5000 INJECTION INTRAVENOUS; SUBCUTANEOUS at 14:59

## 2019-04-17 RX ADMIN — ONDANSETRON 4 MG: 2 INJECTION INTRAMUSCULAR; INTRAVENOUS at 08:20

## 2019-04-17 RX ADMIN — FAMOTIDINE 20 MG: 10 INJECTION, SOLUTION INTRAVENOUS at 08:15

## 2019-04-17 RX ADMIN — FAMOTIDINE 20 MG: 10 INJECTION, SOLUTION INTRAVENOUS at 22:04

## 2019-04-17 RX ADMIN — HYDROCODONE BITARTRATE AND ACETAMINOPHEN 2 TABLET: 5; 325 TABLET ORAL at 14:30

## 2019-04-17 RX ADMIN — HEPARIN SODIUM 5000 UNITS: 5000 INJECTION INTRAVENOUS; SUBCUTANEOUS at 22:04

## 2019-04-17 RX ADMIN — HYDROMORPHONE HYDROCHLORIDE 1 MG: 1 INJECTION, SOLUTION INTRAMUSCULAR; INTRAVENOUS; SUBCUTANEOUS at 11:55

## 2019-04-17 RX ADMIN — SODIUM CHLORIDE, POTASSIUM CHLORIDE, SODIUM LACTATE AND CALCIUM CHLORIDE: 600; 310; 30; 20 INJECTION, SOLUTION INTRAVENOUS at 05:26

## 2019-04-17 RX ADMIN — HYDROCODONE BITARTRATE AND ACETAMINOPHEN 2 TABLET: 5; 325 TABLET ORAL at 18:52

## 2019-04-17 RX ADMIN — HYDROMORPHONE HYDROCHLORIDE 1 MG: 1 INJECTION, SOLUTION INTRAMUSCULAR; INTRAVENOUS; SUBCUTANEOUS at 02:28

## 2019-04-17 RX ADMIN — SODIUM CHLORIDE, POTASSIUM CHLORIDE, SODIUM LACTATE AND CALCIUM CHLORIDE: 600; 310; 30; 20 INJECTION, SOLUTION INTRAVENOUS at 22:12

## 2019-04-17 RX ADMIN — HYDROCODONE BITARTRATE AND ACETAMINOPHEN 2 TABLET: 5; 325 TABLET ORAL at 08:20

## 2019-04-17 ASSESSMENT — PAIN DESCRIPTION - PROGRESSION
CLINICAL_PROGRESSION: GRADUALLY IMPROVING

## 2019-04-17 ASSESSMENT — PAIN DESCRIPTION - PAIN TYPE
TYPE: SURGICAL PAIN;ACUTE PAIN
TYPE: SURGICAL PAIN

## 2019-04-17 ASSESSMENT — PAIN - FUNCTIONAL ASSESSMENT
PAIN_FUNCTIONAL_ASSESSMENT: PREVENTS OR INTERFERES SOME ACTIVE ACTIVITIES AND ADLS

## 2019-04-17 ASSESSMENT — PAIN DESCRIPTION - ORIENTATION
ORIENTATION: ANTERIOR

## 2019-04-17 ASSESSMENT — PAIN SCALES - GENERAL
PAINLEVEL_OUTOF10: 7
PAINLEVEL_OUTOF10: 5
PAINLEVEL_OUTOF10: 9
PAINLEVEL_OUTOF10: 7
PAINLEVEL_OUTOF10: 8
PAINLEVEL_OUTOF10: 4
PAINLEVEL_OUTOF10: 4
PAINLEVEL_OUTOF10: 5
PAINLEVEL_OUTOF10: 7
PAINLEVEL_OUTOF10: 10
PAINLEVEL_OUTOF10: 5
PAINLEVEL_OUTOF10: 6
PAINLEVEL_OUTOF10: 8
PAINLEVEL_OUTOF10: 7

## 2019-04-17 ASSESSMENT — PAIN DESCRIPTION - ONSET
ONSET: ON-GOING

## 2019-04-17 ASSESSMENT — PAIN DESCRIPTION - FREQUENCY
FREQUENCY: CONTINUOUS

## 2019-04-17 ASSESSMENT — PAIN DESCRIPTION - LOCATION
LOCATION: ABDOMEN

## 2019-04-17 ASSESSMENT — PAIN DESCRIPTION - DESCRIPTORS
DESCRIPTORS: CONSTANT

## 2019-04-17 NOTE — PROGRESS NOTES
Surgery Progress Note            PATIENT NAME: Malcom Creed Schober     TODAY'S DATE: 4/17/2019, 5:26 AM      SUBJECTIVE:    Pt seen and examined at bedside. Tolerable pain. Tolerating bariatric CLD. Ambulating. Denies N/V/F/C. Voiding without difficulty. No other complaints noted. OBJECTIVE:   VITALS:  /66   Pulse 84   Temp 98.7 °F (37.1 °C) (Oral)   Resp 16   Ht 5' 5\" (1.651 m)   Wt 288 lb (130.6 kg)   SpO2 99%   BMI 47.93 kg/m²      INTAKE/OUTPUT:      Intake/Output Summary (Last 24 hours) at 4/17/2019 0526  Last data filed at 4/17/2019 0030  Gross per 24 hour   Intake 3266 ml   Output 1395 ml   Net 1871 ml                     CONSTITUTIONAL:  NAD, A&O x 3  LUNGS:  CTA b/l  CARDIOVASCULAR:  S1S2  ABDOMEN: Soft, nondistended, appropriately TTP, port sites I/C/D, dressing intact over the incision, MALATHI drain intact with SS drain. Data:  CBC:   Lab Results   Component Value Date    WBC 15.5 04/16/2019    RBC 4.31 04/16/2019    HGB 12.9 04/16/2019    HCT 39.9 04/16/2019    MCV 92.6 04/16/2019    MCH 29.9 04/16/2019    MCHC 32.3 04/16/2019    RDW 12.2 04/16/2019     04/16/2019    MPV 10.2 04/16/2019     BMP:    Lab Results   Component Value Date     04/16/2019    K 3.6 04/16/2019     04/16/2019    CO2 18 04/16/2019    BUN 14 04/16/2019    LABALBU 4.1 01/08/2019    CREATININE 0.58 04/16/2019    CALCIUM 8.9 04/16/2019    GFRAA >60 04/16/2019    LABGLOM >60 04/16/2019    GLUCOSE 184 04/16/2019         ASSESSMENT   Patient Active Problem List   Diagnosis    GERD without esophagitis    History of depression    Obesity, Class III, BMI 40-49.9 (morbid obesity) (HCC)    Chronic back pain    CURLY (obstructive sleep apnea)    Vitamin D deficiency    Status post gastric bypass for obesity       45 y.o. female POD#0 s/p robotic assisted laparoscopic gilberto-en-y gastric bypass, EGD 2/2 MO, SA, GERD          Plan  1. Tolerating bariatric CLD. Esophagogram today.     2. IVF at

## 2019-04-17 NOTE — PLAN OF CARE
Problem: Fluid Volume - Imbalance:  Goal: Ability to achieve a balanced intake and output will improve  Description  Ability to achieve a balanced intake and output will improve  4/17/2019 1726 by Claudia Ramires RN  Outcome: Met This Shift  4/17/2019 0446 by Mickie Mclean RN  Outcome: Ongoing     Problem: Infection - Surgical Site:  Goal: Will show no infection signs and symptoms  Description  Will show no infection signs and symptoms  4/17/2019 1726 by Caludia Ramires RN  Outcome: Met This Shift  4/17/2019 0446 by Mickie Mclean RN  Outcome: Ongoing  Goal: Ability to maintain a body temperature in the normal range will improve  Description  Ability to maintain a body temperature in the normal range will improve  4/17/2019 1726 by Claudia Ramires RN  Outcome: Met This Shift  4/17/2019 0446 by Mickie Mclean RN  Outcome: Ongoing     Problem: Falls - Risk of:  Goal: Will remain free from falls  Description  Will remain free from falls  Outcome: Met This Shift  Goal: Absence of physical injury  Description  Absence of physical injury  Outcome: Met This Shift     Problem: Discharge Planning:  Goal: Discharged to appropriate level of care  Description  Discharged to appropriate level of care  4/17/2019 1726 by Claudia Ramires RN  Outcome: Ongoing  4/17/2019 0446 by Mickie Mclean RN  Outcome: Not Met This Shift     Problem:  Bowel Function - Altered:  Goal: Bowel elimination is within specified parameters  Description  Bowel elimination is within specified parameters  4/17/2019 1726 by Claudia Ramires RN  Outcome: Ongoing  4/17/2019 0446 by Mickie Mclean RN  Outcome: Ongoing     Problem: Pain:  Goal: Pain level will decrease  Description  Pain level will decrease  4/17/2019 1726 by Claudia Ramires RN  Outcome: Ongoing  4/17/2019 0446 by Mickie Mclean RN  Outcome: Ongoing  Goal: Control of acute pain  Description  Control of acute pain  4/17/2019 1726 by Claudia Ramires RN  Outcome: Ongoing  4/17/2019 0446 by George Wilkins RN  Outcome: Ongoing  Goal: Control of chronic pain  Description  Control of chronic pain  4/17/2019 1726 by Ashutosh Camacho RN  Outcome: Ongoing  4/17/2019 0446 by George Wilkins RN  Outcome: Ongoing     Problem: Pain:  Goal: Pain level will decrease  Description  Pain level will decrease  4/17/2019 1726 by Ashutosh Camacho RN  Outcome: Ongoing  4/17/2019 0446 by George Wilkins RN  Outcome: Ongoing  Goal: Control of acute pain  Description  Control of acute pain  4/17/2019 1726 by Ashutosh Camacho RN  Outcome: Ongoing  4/17/2019 0446 by George Wilkins RN  Outcome: Ongoing  Goal: Control of chronic pain  Description  Control of chronic pain  4/17/2019 1726 by Ashutosh Camacho RN  Outcome: Ongoing  4/17/2019 0446 by George Wilkins RN  Outcome: Ongoing

## 2019-04-17 NOTE — PLAN OF CARE
Problem:  Bowel Function - Altered:  Goal: Bowel elimination is within specified parameters  Description  Bowel elimination is within specified parameters  Outcome: Ongoing     Problem: Fluid Volume - Imbalance:  Goal: Ability to achieve a balanced intake and output will improve  Description  Ability to achieve a balanced intake and output will improve  Outcome: Ongoing     Problem: Infection - Surgical Site:  Goal: Will show no infection signs and symptoms  Description  Will show no infection signs and symptoms  Outcome: Ongoing  Goal: Ability to maintain a body temperature in the normal range will improve  Description  Ability to maintain a body temperature in the normal range will improve  Outcome: Ongoing     Problem: Pain:  Goal: Pain level will decrease  Description  Pain level will decrease  Outcome: Ongoing  Goal: Control of acute pain  Description  Control of acute pain  Outcome: Ongoing  Goal: Control of chronic pain  Description  Control of chronic pain  Outcome: Ongoing     Problem: Pain:  Goal: Pain level will decrease  Description  Pain level will decrease  Outcome: Ongoing  Goal: Control of acute pain  Description  Control of acute pain  Outcome: Ongoing  Goal: Control of chronic pain  Description  Control of chronic pain  Outcome: Ongoing

## 2019-04-18 ENCOUNTER — TELEPHONE (OUTPATIENT)
Dept: BARIATRICS/WEIGHT MGMT | Age: 39
End: 2019-04-18

## 2019-04-18 VITALS
TEMPERATURE: 98.6 F | DIASTOLIC BLOOD PRESSURE: 60 MMHG | HEIGHT: 65 IN | SYSTOLIC BLOOD PRESSURE: 110 MMHG | RESPIRATION RATE: 15 BRPM | BODY MASS INDEX: 47.98 KG/M2 | HEART RATE: 86 BPM | OXYGEN SATURATION: 88 % | WEIGHT: 288 LBS

## 2019-04-18 LAB
ANION GAP SERPL CALCULATED.3IONS-SCNC: 9 MMOL/L (ref 9–17)
BUN BLDV-MCNC: 9 MG/DL (ref 6–20)
BUN/CREAT BLD: ABNORMAL (ref 9–20)
CALCIUM SERPL-MCNC: 8.2 MG/DL (ref 8.6–10.4)
CHLORIDE BLD-SCNC: 101 MMOL/L (ref 98–107)
CO2: 25 MMOL/L (ref 20–31)
CREAT SERPL-MCNC: 0.47 MG/DL (ref 0.5–0.9)
GFR AFRICAN AMERICAN: >60 ML/MIN
GFR NON-AFRICAN AMERICAN: >60 ML/MIN
GFR SERPL CREATININE-BSD FRML MDRD: ABNORMAL ML/MIN/{1.73_M2}
GFR SERPL CREATININE-BSD FRML MDRD: ABNORMAL ML/MIN/{1.73_M2}
GLUCOSE BLD-MCNC: 96 MG/DL (ref 70–99)
HCT VFR BLD CALC: 33.3 % (ref 36.3–47.1)
HEMOGLOBIN: 10.3 G/DL (ref 11.9–15.1)
MCH RBC QN AUTO: 29.6 PG (ref 25.2–33.5)
MCHC RBC AUTO-ENTMCNC: 30.9 G/DL (ref 28.4–34.8)
MCV RBC AUTO: 95.7 FL (ref 82.6–102.9)
NRBC AUTOMATED: 0 PER 100 WBC
PDW BLD-RTO: 12.4 % (ref 11.8–14.4)
PLATELET # BLD: 156 K/UL (ref 138–453)
PMV BLD AUTO: 9.9 FL (ref 8.1–13.5)
POTASSIUM SERPL-SCNC: 3.8 MMOL/L (ref 3.7–5.3)
RBC # BLD: 3.48 M/UL (ref 3.95–5.11)
SODIUM BLD-SCNC: 135 MMOL/L (ref 135–144)
WBC # BLD: 7.4 K/UL (ref 3.5–11.3)

## 2019-04-18 PROCEDURE — 2500000003 HC RX 250 WO HCPCS: Performed by: SURGERY

## 2019-04-18 PROCEDURE — 2580000003 HC RX 258: Performed by: SURGERY

## 2019-04-18 PROCEDURE — 94762 N-INVAS EAR/PLS OXIMTRY CONT: CPT

## 2019-04-18 PROCEDURE — 6360000002 HC RX W HCPCS: Performed by: SURGERY

## 2019-04-18 PROCEDURE — 6370000000 HC RX 637 (ALT 250 FOR IP): Performed by: SURGERY

## 2019-04-18 PROCEDURE — 85027 COMPLETE CBC AUTOMATED: CPT

## 2019-04-18 PROCEDURE — 36415 COLL VENOUS BLD VENIPUNCTURE: CPT

## 2019-04-18 PROCEDURE — 80048 BASIC METABOLIC PNL TOTAL CA: CPT

## 2019-04-18 RX ADMIN — HYDROCODONE BITARTRATE AND ACETAMINOPHEN 2 TABLET: 5; 325 TABLET ORAL at 06:02

## 2019-04-18 RX ADMIN — SODIUM CHLORIDE, POTASSIUM CHLORIDE, SODIUM LACTATE AND CALCIUM CHLORIDE: 600; 310; 30; 20 INJECTION, SOLUTION INTRAVENOUS at 06:02

## 2019-04-18 RX ADMIN — FAMOTIDINE 20 MG: 10 INJECTION, SOLUTION INTRAVENOUS at 09:01

## 2019-04-18 RX ADMIN — ONDANSETRON 4 MG: 2 INJECTION INTRAMUSCULAR; INTRAVENOUS at 10:06

## 2019-04-18 RX ADMIN — HYDROCODONE BITARTRATE AND ACETAMINOPHEN 2 TABLET: 5; 325 TABLET ORAL at 10:05

## 2019-04-18 RX ADMIN — HYDROCODONE BITARTRATE AND ACETAMINOPHEN 2 TABLET: 5; 325 TABLET ORAL at 01:52

## 2019-04-18 RX ADMIN — HEPARIN SODIUM 5000 UNITS: 5000 INJECTION INTRAVENOUS; SUBCUTANEOUS at 06:02

## 2019-04-18 ASSESSMENT — PAIN SCALES - GENERAL
PAINLEVEL_OUTOF10: 7
PAINLEVEL_OUTOF10: 4
PAINLEVEL_OUTOF10: 3
PAINLEVEL_OUTOF10: 7
PAINLEVEL_OUTOF10: 7
PAINLEVEL_OUTOF10: 6

## 2019-04-18 NOTE — PLAN OF CARE
Problem: Discharge Planning:  Goal: Discharged to appropriate level of care  Description  Discharged to appropriate level of care  Outcome: Met This Shift     Problem:  Bowel Function - Altered:  Goal: Bowel elimination is within specified parameters  Description  Bowel elimination is within specified parameters  Outcome: Met This Shift     Problem: Fluid Volume - Imbalance:  Goal: Ability to achieve a balanced intake and output will improve  Description  Ability to achieve a balanced intake and output will improve  Outcome: Met This Shift     Problem: Infection - Surgical Site:  Goal: Will show no infection signs and symptoms  Description  Will show no infection signs and symptoms  Outcome: Met This Shift  Goal: Ability to maintain a body temperature in the normal range will improve  Description  Ability to maintain a body temperature in the normal range will improve  Outcome: Met This Shift     Problem: Falls - Risk of:  Goal: Will remain free from falls  Description  Will remain free from falls  Outcome: Met This Shift  Goal: Absence of physical injury  Description  Absence of physical injury  Outcome: Met This Shift     Problem: Pain:  Goal: Pain level will decrease  Description  Pain level will decrease  Outcome: Ongoing  Goal: Control of acute pain  Description  Control of acute pain  Outcome: Ongoing  Goal: Control of chronic pain  Description  Control of chronic pain  Outcome: Ongoing     Problem: Pain:  Goal: Pain level will decrease  Description  Pain level will decrease  Outcome: Ongoing  Goal: Control of acute pain  Description  Control of acute pain  Outcome: Ongoing  Goal: Control of chronic pain  Description  Control of chronic pain  Outcome: Ongoing

## 2019-04-18 NOTE — PROGRESS NOTES
CLINICAL PHARMACY NOTE: MEDS TO [address] Select Patient?: No  Total # of Prescriptions Filled: 3   The following medications were delivered to the patient:  · Enoxaparin 30mg/0.3ml  · Promethazine 12.5mg  · norco 5-325mg  Total # of Interventions Completed: 0  Time Spent (min): 0    Additional Documentation: meds delivered 4-18-19 at 11:33am

## 2019-04-18 NOTE — FLOWSHEET NOTE
Patient was receptive to 's visit. No needs expressed at this time. Chaplains will remain available to offer spiritual and emotional support as needed.      04/17/19 2036   Encounter Summary   Services provided to: Patient   Referral/Consult From: [facility]   Support System Spouse   Continue Visiting   (4/17/19)   Complexity of Encounter Low   Length of Encounter 15 minutes   Spiritual Assessment Completed Yes   Routine   Type Initial   Assessment Approachable;Calm   Intervention Active listening;Explored feelings, thoughts, concerns;Prayer;Sustaining presence/ Ministry of presence   Outcome Expressed gratitude

## 2019-04-18 NOTE — PROGRESS NOTES
Surgery Progress Note            PATIENT NAME: Mari Delton Schober     TODAY'S DATE: 4/18/2019, 6:17 AM      SUBJECTIVE:    Pt seen and examined at bedside. Tolerable pain. Tolerating bariatric CLD. Ambulating. Denies N/V/F/C. Voiding without difficulty. No other complaints noted. OBJECTIVE:   VITALS:  /63   Pulse 76 Comment: regular  Temp 99.2 °F (37.3 °C) (Oral)   Resp 15 Comment: unlabored  Ht 5' 5\" (1.651 m)   Wt 288 lb (130.6 kg)   SpO2 98%   BMI 47.93 kg/m²      INTAKE/OUTPUT:      Intake/Output Summary (Last 24 hours) at 4/18/2019 0617  Last data filed at 4/17/2019 2000  Gross per 24 hour   Intake 1900.82 ml   Output 1550 ml   Net 350.82 ml                     CONSTITUTIONAL:  NAD, A&O x 3  LUNGS:  CTA b/l  CARDIOVASCULAR:  S1S2  ABDOMEN: Soft, nondistended, appropriately TTP, port sites I/C/D, dressing intact over the incision, MALATHI drain intact with SS drain. Data:  CBC:   Lab Results   Component Value Date    WBC 11.4 04/17/2019    RBC 3.83 04/17/2019    HGB 11.5 04/17/2019    HCT 35.0 04/17/2019    MCV 91.4 04/17/2019    MCH 30.0 04/17/2019    MCHC 32.9 04/17/2019    RDW 12.2 04/17/2019     04/17/2019    MPV 10.4 04/17/2019     BMP:    Lab Results   Component Value Date     04/17/2019    K 4.3 04/17/2019     04/17/2019    CO2 25 04/17/2019    BUN 9 04/17/2019    LABALBU 4.1 01/08/2019    CREATININE 0.48 04/17/2019    CALCIUM 8.6 04/17/2019    GFRAA >60 04/17/2019    LABGLOM >60 04/17/2019    GLUCOSE 132 04/17/2019         ASSESSMENT   Patient Active Problem List   Diagnosis    GERD without esophagitis    History of depression    Obesity, Class III, BMI 40-49.9 (morbid obesity) (HCC)    Chronic back pain    CURLY (obstructive sleep apnea)    Vitamin D deficiency    Status post gastric bypass for obesity       45 y.o. female POD#2 s/p robotic assisted laparoscopic gilberto-en-y gastric bypass, EGD 2/2 MO, SA, GERD          Plan  1.  Tolerating bariatric CLD.    2. Pain control with Dilaudid and Percocet PRN. 3. Nausea control with Zofran and Phenergan PRN  4. Encourage ambulation and IS usages. 5. DVT prophylaxis with Heparin TID. 6. GI prophylaxis with Pepcid. 7. Home today. F/u in 1-2 weeks.     Electronically signed by Kristina Rosenthal DO  on 4/18/2019 at 6:17 AM

## 2019-04-18 NOTE — DISCHARGE SUMMARY
Physician Discharge Summary     Patient ID:  Mirtha You  [id number]  54 y.o.  1980    Admit date: 4/16/2019    Discharge date and time: 4/18/2019    Admitting Physician: Jeannine Merchant DO     Discharge Physician: Dr. Rosette Park    Admission Diagnoses: Status post gastric bypass for obesity [Z98.84]    Discharge Diagnoses:     38 y. o. female POD#2 s/p robotic assisted laparoscopic gilberto-en-y gastric bypass, EGD 2/2 MO, SA, GERD        Admission Condition: stable    Discharged Condition: stable    Indication for Admission: Morbid Obesity    Hospital Course: This is 45 y.o. female who underwent robotic assisted laparoscopic Gilberto-En-Y gastric bypass, EGD on 4/16/2019 without any complications. Patient was started on bariatric CLD 6 hour post procedure and had esophagogram on POD#1. The study was negative for any leaks. Pain was well controlled. Ambulating without any difficulty. Patient is to be d/cd home in stable condition on 4/18. Consults: none    Discharge Exam:  CONSTITUTIONAL:  NAD, A&O x 3  LUNGS:  CTA b/l  CARDIOVASCULAR:  S1S2  ABDOMEN: Soft, nondistended, no TTP, dried dressing intact over port sites. MALATHI drain intact with SS drainage    Disposition: home    Patient Instructions:      Medication List      START taking these medications    enoxaparin 30 MG/0.3ML injection  Commonly known as:  LOVENOX  Inject 0.3 mLs into the skin 2 times daily for 14 days     HYDROcodone-acetaminophen 5-325 MG per tablet  Commonly known as:  NORCO  Take 1 tablet by mouth every 6 hours as needed for Pain for up to 7 days.      promethazine 12.5 MG tablet  Commonly known as:  PHENERGAN  Take 1 tablet by mouth every 6 hours as needed for Nausea        CONTINUE taking these medications    cyclobenzaprine 10 MG tablet  Commonly known as:  FLEXERIL     XANAX 0.5 MG tablet  Generic drug:  ALPRAZolam        STOP taking these medications    MOBIC 15 MG tablet  Generic drug:  meloxicam     vitamin D 02520 units Caps capsule  Commonly known as:  ERGOCALCIFEROL           Where to Get Your Medications      You can get these medications from any pharmacy    Bring a paper prescription for each of these medications  · enoxaparin 30 MG/0.3ML injection  · HYDROcodone-acetaminophen 5-325 MG per tablet  · promethazine 12.5 MG tablet         Activity: no lifting, or Strenuous exercise for 2 weeks. Diet: clear liquids  Wound Care: keep wound clean and dry    Follow-up with Dr. Rick Mcarthur in 1 week.     Karley Flowers  4/18/2019  6:18 AM

## 2019-04-22 ENCOUNTER — HOSPITAL ENCOUNTER (OUTPATIENT)
Dept: CT IMAGING | Age: 39
Discharge: HOME | End: 2019-04-24
Payer: COMMERCIAL

## 2019-04-22 ENCOUNTER — OFFICE VISIT (OUTPATIENT)
Dept: BARIATRICS/WEIGHT MGMT | Age: 39
End: 2019-04-22

## 2019-04-22 ENCOUNTER — TELEPHONE (OUTPATIENT)
Dept: BARIATRICS/WEIGHT MGMT | Age: 39
End: 2019-04-22

## 2019-04-22 VITALS
SYSTOLIC BLOOD PRESSURE: 128 MMHG | WEIGHT: 277 LBS | DIASTOLIC BLOOD PRESSURE: 80 MMHG | HEART RATE: 68 BPM | BODY MASS INDEX: 46.15 KG/M2 | HEIGHT: 65 IN | RESPIRATION RATE: 20 BRPM | TEMPERATURE: 98.5 F

## 2019-04-22 DIAGNOSIS — Z98.84 STATUS POST GASTRIC BYPASS FOR OBESITY: ICD-10-CM

## 2019-04-22 DIAGNOSIS — K59.00 CONSTIPATION, UNSPECIFIED CONSTIPATION TYPE: Primary | ICD-10-CM

## 2019-04-22 DIAGNOSIS — R10.12 LEFT UPPER QUADRANT PAIN: ICD-10-CM

## 2019-04-22 DIAGNOSIS — E66.01 MORBID OBESITY WITH BMI OF 45.0-49.9, ADULT (HCC): ICD-10-CM

## 2019-04-22 DIAGNOSIS — K59.00 CONSTIPATION, UNSPECIFIED CONSTIPATION TYPE: ICD-10-CM

## 2019-04-22 PROBLEM — R10.9 ABDOMINAL PAIN: Status: ACTIVE | Noted: 2019-04-22

## 2019-04-22 PROCEDURE — 99024 POSTOP FOLLOW-UP VISIT: CPT | Performed by: NURSE PRACTITIONER

## 2019-04-22 PROCEDURE — 6360000004 HC RX CONTRAST MEDICATION: Performed by: NURSE PRACTITIONER

## 2019-04-22 PROCEDURE — 74177 CT ABD & PELVIS W/CONTRAST: CPT

## 2019-04-22 RX ORDER — CYCLOBENZAPRINE HCL 10 MG
10 TABLET ORAL 3 TIMES DAILY PRN
Qty: 30 TABLET | Refills: 0 | Status: SHIPPED | OUTPATIENT
Start: 2019-04-22 | End: 2019-05-02

## 2019-04-22 RX ORDER — LIDOCAINE 50 MG/G
1 PATCH TOPICAL DAILY
Qty: 30 PATCH | Refills: 0 | Status: SHIPPED | OUTPATIENT
Start: 2019-04-22 | End: 2019-04-26 | Stop reason: SDUPTHER

## 2019-04-22 RX ADMIN — IOVERSOL 75 ML: 741 INJECTION INTRA-ARTERIAL; INTRAVENOUS at 17:16

## 2019-04-22 NOTE — TELEPHONE ENCOUNTER
Spoke with pt  - 2 main  Issues  1. Constipation - she will try a rectal strategy  2. Right sided abd burning/stinging pain.  She was using heat but will change to topical ice pack - she is also still using Rx pain pills b/c tylenol does not relive     Will come in for an appt today

## 2019-04-22 NOTE — TELEPHONE ENCOUNTER
Next Visit Date:  4/26/2019    Patient Surgery Date  4/16    Type of  Surgery  bypass    Patient calls complaining of  Burning stabbing l sided pain; very intense with movement; No   Bm since surgery has taken Miralax daily for the past three days without result; Not taking lovenex as directed by surgeon due to low hemoglobin but moving around a lot    Onset: since surgery  ago  Timing: constant, intermittent  Severity: Severe at times  Progression: stable    Associated Symptoms: none    Any allergy  To medications     Did not ask    Current  Pharmacy   Did not ask    Patient advised:   If  Symptoms worsen seek treatment at  Emergency Room. You will receive a call back from the office within 24-48 hours.     Is it ok to leave a message if we call back did not ask

## 2019-04-22 NOTE — PROGRESS NOTES
Smoker    Smokeless tobacco: Never Used   Substance and Sexual Activity    Alcohol use: Yes    Drug use: Yes     Types: Marijuana     Comment: last used 6 months ago    Sexual activity: Not on file   Lifestyle    Physical activity:     Days per week: Not on file     Minutes per session: Not on file    Stress: Not on file   Relationships    Social connections:     Talks on phone: Not on file     Gets together: Not on file     Attends Congregation service: Not on file     Active member of club or organization: Not on file     Attends meetings of clubs or organizations: Not on file     Relationship status: Not on file    Intimate partner violence:     Fear of current or ex partner: Not on file     Emotionally abused: Not on file     Physically abused: Not on file     Forced sexual activity: Not on file   Other Topics Concern    Not on file   Social History Narrative    Not on file       Current Medications:  Current Outpatient Medications   Medication Sig Dispense Refill    lidocaine (LIDODERM) 5 % Place 1 patch onto the skin daily 12 hours on, 12 hours off. 30 patch 0    cyclobenzaprine (FLEXERIL) 10 MG tablet Take 1 tablet by mouth 3 times daily as needed for Muscle spasms 30 tablet 0    HYDROcodone-acetaminophen (NORCO) 5-325 MG per tablet Take 1 tablet by mouth every 6 hours as needed for Pain for up to 7 days. 40 tablet 0    promethazine (PHENERGAN) 12.5 MG tablet Take 1 tablet by mouth every 6 hours as needed for Nausea 20 tablet 0    enoxaparin (LOVENOX) 30 MG/0.3ML injection Inject 0.3 mLs into the skin 2 times daily for 14 days 8.4 mL 0    ALPRAZolam (XANAX) 0.5 MG tablet 1 tablet as needed       No current facility-administered medications for this visit.         Vital Signs:  /80 (Site: Right Upper Arm, Position: Sitting, Cuff Size: Large Adult)   Pulse 68   Temp 98.5 °F (36.9 °C) (Oral)   Resp 20   Ht 5' 5\" (1.651 m)   Wt 277 lb (125.6 kg)   BMI 46.10 kg/m² BMI/Height/Weight:  Body mass index is 46.1 kg/m². Review of Systems - A review of systems was performed. All was negative unless otherwise documented in HPI. Constitutional: Negative for fever, chills and diaphoresis. HENT: Negative for hearing loss and trouble swallowing. Eyes: Negative for photophobia and visual disturbance. Respiratory: Negative for cough, shortness of breath and wheezing. Cardiovascular: Negative for chest pain and palpitations. Gastrointestinal: Negative for nausea, vomiting, diarrhea, blood in stool and abdominal distention. Positive for left abdominal pain and constipation. Endocrine: Negative for polydipsia, polyphagia and polyuria. Genitourinary: Negative for dysuria, frequency, hematuria and difficulty urinating. Musculoskeletal: Negative for myalgias, joint swelling. Skin: Negative for pallor and rash. Neurological: Negative for dizziness, tremors, light-headedness and headaches. Psychiatric/Behavioral: Negative for sleep disturbance and dysphoric mood. Objective:      Physical Exam   Vital signs reviewed. General: Well-developed and well-nourished. No acute distress. Skin: Warm, dry and intact. HEENT: Normocephalic. EOMs intact. Conjunctivae normal. Neck supple. Cardiovascular: Normal rate, regular rhythm. Pulmonary/Chest: Normal effort. Lungs clear to auscultation. No rales, rhonchi or wheezing. Abdominal: Positive bowel sounds. Soft, tenderness left abdomen. Nondistended. No rigidity, rebound, or guarding. Incisions C/D/I.  MALATHI drain intact with serosanq fluid. Musculoskeletal: Movement x4. No edema. Neurological: Gait normal. Alert and oriented to person, place, and time. Psychiatric: Normal mood and affect. Speech and behavior normal. Judgment and thought content normal. Cognition and memory intact. Assessment:       Diagnosis Orders   1.  Constipation, unspecified constipation type  lidocaine (LIDODERM) 5 %    cyclobenzaprine (FLEXERIL) 10 MG tablet    CT ABDOMEN PELVIS W IV CONTRAST Additional Contrast? Oral   2. Status post gastric bypass for obesity  lidocaine (LIDODERM) 5 %    cyclobenzaprine (FLEXERIL) 10 MG tablet    CT ABDOMEN PELVIS W IV CONTRAST Additional Contrast? Oral   3. Morbid obesity with BMI of 45.0-49.9, adult (HCC)  lidocaine (LIDODERM) 5 %    cyclobenzaprine (FLEXERIL) 10 MG tablet    CT ABDOMEN PELVIS W IV CONTRAST Additional Contrast? Oral   4. Left upper quadrant pain  lidocaine (LIDODERM) 5 %    cyclobenzaprine (FLEXERIL) 10 MG tablet    CT ABDOMEN PELVIS W IV CONTRAST Additional Contrast? Oral       Plan:    Pain likely musculoskeletal, but will do CT to evaluate for obstruction, hernia, or other intra-abdominal pathology. Lidoderm patches and Flexeril prescribed. Wear abdominal binder while up and around. Use ice to the area. OTC suppositories or Fleet's enema. Call tomorrow with progress report. Follow-up  Return in about 4 days (around 4/26/2019). Orders this encounter:  Orders Placed This Encounter   Procedures    CT ABDOMEN PELVIS W IV CONTRAST Additional Contrast? Oral     Standing Status:   Future     Standing Expiration Date:   4/22/2020     Order Specific Question:   Additional Contrast?     Answer:   Oral     Order Specific Question:   Reason for exam:     Answer:   s/p gastric bypass. Left abdominal pain. Constipation. Order Specific Question:   Reason for exam:     Answer:   Use low volume contrast.  Hold and call Jesus Rayo CNP at [phone] with results. Prescriptions this encounter:  Orders Placed This Encounter   Medications    lidocaine (LIDODERM) 5 %     Sig: Place 1 patch onto the skin daily 12 hours on, 12 hours off.      Dispense:  30 patch     Refill:  0    cyclobenzaprine (FLEXERIL) 10 MG tablet     Sig: Take 1 tablet by mouth 3 times daily as needed for Muscle spasms     Dispense:  30 tablet     Refill:  0       Electronically signed by:  Jesus Rayo

## 2019-04-26 ENCOUNTER — OFFICE VISIT (OUTPATIENT)
Dept: BARIATRICS/WEIGHT MGMT | Age: 39
End: 2019-04-26

## 2019-04-26 VITALS
WEIGHT: 271 LBS | TEMPERATURE: 98.8 F | HEART RATE: 82 BPM | DIASTOLIC BLOOD PRESSURE: 78 MMHG | HEIGHT: 65 IN | SYSTOLIC BLOOD PRESSURE: 114 MMHG | BODY MASS INDEX: 45.15 KG/M2 | RESPIRATION RATE: 20 BRPM

## 2019-04-26 DIAGNOSIS — R10.12 LEFT UPPER QUADRANT PAIN: ICD-10-CM

## 2019-04-26 DIAGNOSIS — K59.00 CONSTIPATION, UNSPECIFIED CONSTIPATION TYPE: ICD-10-CM

## 2019-04-26 DIAGNOSIS — E66.01 MORBID OBESITY WITH BMI OF 45.0-49.9, ADULT (HCC): ICD-10-CM

## 2019-04-26 DIAGNOSIS — Z98.84 STATUS POST GASTRIC BYPASS FOR OBESITY: ICD-10-CM

## 2019-04-26 PROCEDURE — 99024 POSTOP FOLLOW-UP VISIT: CPT | Performed by: SURGERY

## 2019-04-26 RX ORDER — LIDOCAINE 50 MG/G
1 PATCH TOPICAL DAILY
Qty: 30 PATCH | Refills: 0 | Status: SHIPPED | OUTPATIENT
Start: 2019-04-26 | End: 2019-05-03 | Stop reason: SDUPTHER

## 2019-04-26 NOTE — LETTER
Visit Date: 4/26/2019    Patient: Ami Su  YOB: 1980    Dear Dr. Therese Parker MD,           As you know we are treating your patient for obesity (as indicated by elevated  BMI of 30 kg/(m^2) or greater)  with a gastric bypass. Kelby Martinez had surgery on April 16, 2019. She was discharged home uneventfully. Her current Weight: 271 lb (122.9 kg),  her  Body mass index is 45.1 kg/m². Over the next year the patient will be scheduled with our team at  1 month, 3 months, 6 months, 9 months, 1 year, and annually as well. At each visit we will review nutrition related labs, weight, vitamin/mineral intake, and overall health. Kelby Martinez will have the opportunity to partner with the dietitian to continue to review health related goals. The team here would very much appreciate your assistance in encouraging your patient to attend these very important appointments. As our patients begin to heal from surgery, lose weight, and experience improved quality of life there will inevitably be struggles due to the chronic and relapsing nature of obesity. We offer closer follow up as needed, support groups and a  to support them. Thank you for allowing me to participate in the care of your patient. If you have any questions or concerns, please do not hesitate to call.       Sincerely,   Dr. Red Edward, DO CHAR[facility]ERN[facility]NDEZ [facility]  [address] [facility] [address]  O: [phone]  F: [phone]

## 2019-04-28 NOTE — PROGRESS NOTES
Abdominal Pain   [x] Diarrhea     [x] Constipation  [] Other:     Positive for: [] Heartburn   [] Reflux   [] Dysphagia   [] Melena   [] BRBPR  [] Vomiting   [] Abdominal Pain   [] Diarrhea     [] Constipation  [] Other:    Muskuloskeletal Negative for: [x] Joint pain   [] Back pain   [] Knee Pain   [x] Muscle weakness [x] Hernia   [] Edema [] Other:     Positive for: [] Joint pain   [] Back pain   [] Knee Pain   [] Muscle weakness [] Hernia   [] Edema [] Other:    Neurologic Negative for: [x] Syncope   [x] Insomnia   [] Being treated for depression  [] Other:     Positive for: [] Syncope   [] Insomnia   [] Being treated for depression  [] Other:    Skin Negative for: [x] Wound:   [] Open   [] Draining   [] Incisional     [x] Rash   [] Hair Loss  [] Other:     Positive for: [] Wound:   [] Open   [] Draining    [] Incisional  [] Rash   [] Hair Loss  [] Other:          Physical Assessment:     /78 (Site: Left Upper Arm, Position: Sitting, Cuff Size: Large Adult)   Pulse 82   Temp 98.8 °F (37.1 °C) (Oral)   Resp 20   Ht 5' 5\" (1.651 m)   Wt 271 lb (122.9 kg)   BMI 45.10 kg/m²   General Negative for:  [x] Lapses in memory   [x] Unusual Stress   [] Diffic Concen [] Unable to sleep   [] Eating in response to stress   [] Other:    Positive for:  [] Lapses in memory   [] Unusual Stress   [] Diffic Concen [] Unable to sleep   [] Eating in response to stress   [] Other:   Cardio-Pulmonary   [x] Heart RRR   [x] No murmurs   [] Pulses nl x4 extrem    [x] Good inspiratory effort   [x] No wheezing     [x] Lungs clear to auscultation bilaterally    [] Other:    Gastro-Intestinal   [x] Abd S/NT/ND/Benign   [x] No abdominal mass/hernia    [x] No Splenomegaly    [] Other:    Muskuloskeletal   [x] Good muscle strength x4 extremities   [x] nl gait and ambul    [x] Nl ROM x4 extremities    [] Other:    Neurologic   [x] Alert and oriented x3    [] Other:   Skin   [x] Intact w/ no open wounds   [x] Incisions C/D/I    [] Steri

## 2019-05-03 ENCOUNTER — OFFICE VISIT (OUTPATIENT)
Dept: BARIATRICS/WEIGHT MGMT | Age: 39
End: 2019-05-03

## 2019-05-03 VITALS
HEART RATE: 74 BPM | RESPIRATION RATE: 20 BRPM | WEIGHT: 269 LBS | DIASTOLIC BLOOD PRESSURE: 72 MMHG | SYSTOLIC BLOOD PRESSURE: 104 MMHG | HEIGHT: 65 IN | BODY MASS INDEX: 44.82 KG/M2

## 2019-05-03 DIAGNOSIS — E66.01 MORBID OBESITY WITH BMI OF 45.0-49.9, ADULT (HCC): ICD-10-CM

## 2019-05-03 DIAGNOSIS — R10.12 LEFT UPPER QUADRANT PAIN: ICD-10-CM

## 2019-05-03 DIAGNOSIS — K59.00 CONSTIPATION, UNSPECIFIED CONSTIPATION TYPE: ICD-10-CM

## 2019-05-03 DIAGNOSIS — Z98.84 STATUS POST GASTRIC BYPASS FOR OBESITY: ICD-10-CM

## 2019-05-03 PROCEDURE — 99024 POSTOP FOLLOW-UP VISIT: CPT | Performed by: SURGERY

## 2019-05-03 RX ORDER — URSODIOL 300 MG/1
300 CAPSULE ORAL 2 TIMES DAILY
Qty: 180 CAPSULE | Refills: 0 | Status: SHIPPED | OUTPATIENT
Start: 2019-05-03 | End: 2019-12-26 | Stop reason: ALTCHOICE

## 2019-05-03 RX ORDER — PANTOPRAZOLE SODIUM 40 MG/1
40 TABLET, DELAYED RELEASE ORAL DAILY
Qty: 90 TABLET | Refills: 2 | Status: SHIPPED | OUTPATIENT
Start: 2019-05-03 | End: 2020-06-05

## 2019-05-03 RX ORDER — LIDOCAINE 50 MG/G
1 PATCH TOPICAL DAILY
Qty: 30 PATCH | Refills: 0 | Status: SHIPPED | OUTPATIENT
Start: 2019-05-03 | End: 2019-06-02

## 2019-05-05 NOTE — PROGRESS NOTES
[facility]  [facility]  [address]  [address]  [facility] [address]  Dept: [phone]    SURGICAL WEIGHT LOSS MANAGEMENT PROGRAM  PROGRESS NOTE     CC: Weight Loss    Patient: Pau Forte      Service Date: 5/3/2019  Visit:  2 week    Medical Record #: [id number]  Date of Surgery:   4/16/2019    Reason for Visit: Routine Post-Operative:  [] New Problem /   [] FU of existing problem    Patient here for 2 week visit after gastric bypass. Improvement in pain at left port sites. Relieved by Lidoderm patch. Ambulating, voiding and has had 4 BM's now. No nausea vomiting. Tolerating about 50 oz per day    Height: 5' 5\" (1.651 m)  Highest Weight:   280 lbs    Current Visit Weight Information  Weight: 269 lb (122 kg)   BMI: Body mass index is 44.76 kg/m². Weight loss since surgery:     11    1 wk - D/C'd home on VTE Prohylaxis? [x] Yes   [] No   On VTE Proph as directed? [] Yes   [x] No     [Labs to be drawn prior to 1m, 3m, 6m, 12m, 18m, and annual visits]    Liver pathology:    [x] NA    [] No Gross path    [] Liver Steatosis   [] Discussed w/ pt   [] Referred to GI    Exercising?    [] Yes    [x] No       Review of Systems:     General  Negative for: [] Weight Change   [x] Fatigue   [x] Fevers & Chills    [] Appetite change [] Other:    Positive for: [x] Weight Change   [] Fatigue   [] Fevers & Chills    [] Appetite change [] Other:   Cardiac  Negative for: [x] Chest Pain   [] Difficulty Breathing   [] Leg Cramps [x] Edema of Lower Extremeties    [] Left   [] Right      Positive for: [] Chest Pain   [] Difficulty Breathing   [] Leg Cramps [] Edema of Lower Extremeties    [] Left   [] Right   Pulmonary  Negative for: [x] Shortness of Breath [] Wheeze [x] Cough  [] Calf Pain     Positive for: [] Shortness of Breath [] Wheeze [] Cough  [] Calf Pain   Gastro-Intestinal Negative for: [] Heartburn   [] Reflux   [x] Dysphagia   [x] Melena   [x] BRBPR  [x] Vomiting drainage or Infection    [] Other:      Assessment & Plan:      1. Status post gastric bypass for obesity    2. Morbid obesity with BMI of 45.0-49.9, adult ([address]    3. Constipation, unspecified constipation type    4. Left upper quadrant pain            [x] Actigal: [] NA   [] S/p Cholecystectomy  [] Continue 3 months post-op  [] Finished    [x] Advance Diet    [x] Daily protein (65-75gm/day)   [x] Take Vitamins   [x] Attend Support Group    [x] Exercise Regularly     [x] Use contraception:    [] NA    [] s/p Hysterectomy   [] s/p Tubal ligation   [] Other:     sutures removed    Miralax daily for constipation    Ambulation    lovenox    Follow up in 1 month    Dietician visit today  Follow up: Return in about 1 month (around 6/3/2019) for Post-Op. Orders placed this encounter:   No orders of the defined types were placed in this encounter. New Prescriptions:   Orders Placed This Encounter   Medications    lidocaine (LIDODERM) 5 %     Sig: Place 1 patch onto the skin daily 12 hours on, 12 hours off. Dispense:  30 patch     Refill:  0    pantoprazole (PROTONIX) 40 MG tablet     Sig: Take 1 tablet by mouth daily     Dispense:  90 tablet     Refill:  2    ursodiol (ACTIGALL) 300 MG capsule     Sig: Take 1 capsule by mouth 2 times daily     Dispense:  180 capsule     Refill:  0        Electronically signed by Yancy Wilson DO on 5/5/2019 at 2:19 PM    Please note that this chart was generated using voice recognition Dragon dictation software. Although every effort was made to ensure the accuracy of this automated transcription, some errors in transcription may have occurred.

## 2019-05-10 ENCOUNTER — TELEPHONE (OUTPATIENT)
Dept: BARIATRICS/WEIGHT MGMT | Age: 39
End: 2019-05-10

## 2019-05-10 RX ORDER — CYCLOBENZAPRINE HCL 10 MG
10 TABLET ORAL 3 TIMES DAILY PRN
Qty: 30 TABLET | Refills: 0 | Status: SHIPPED | OUTPATIENT
Start: 2019-05-10 | End: 2019-05-20

## 2019-05-10 NOTE — TELEPHONE ENCOUNTER
Next Visit Date:  5/31/2019    Patient Surgery Date  4/16/19    Type of  Surgery  Bypass    Patient calls complaining of: pharmacy states they did not get prescription for lidocaine patches. Patient states she would like another prescription for Flexeril and Vicodin if possible in case she needs them. I will call patches into pharmacy. Any allergy  To medications     NKA    Current  [address]    Patient advised:   If  Symptoms worsen seek treatment at  Emergency Room. You will receive a call back from the office within 24-48 hours.     Is it ok to leave a message if we call back: yes

## 2019-05-10 NOTE — TELEPHONE ENCOUNTER
I spoke with pharmacy and they state the prescription was wrote for one patch on for 12hrs and off for 12hrs. Patient states she was wearing 3-4 at a time across her abdomen so that is why she ran out early.

## 2019-05-10 NOTE — TELEPHONE ENCOUNTER
I let patient know per Dr Rosette Park he sent prescription for Flexeril to the pharmacy, he is not refilling the Vicodin.

## 2019-05-31 ENCOUNTER — OFFICE VISIT (OUTPATIENT)
Dept: BARIATRICS/WEIGHT MGMT | Age: 39
End: 2019-05-31

## 2019-05-31 VITALS
SYSTOLIC BLOOD PRESSURE: 114 MMHG | HEART RATE: 66 BPM | HEIGHT: 65 IN | BODY MASS INDEX: 42.32 KG/M2 | RESPIRATION RATE: 20 BRPM | WEIGHT: 254 LBS | DIASTOLIC BLOOD PRESSURE: 72 MMHG

## 2019-05-31 DIAGNOSIS — Z98.84 STATUS POST BARIATRIC SURGERY: Primary | ICD-10-CM

## 2019-05-31 PROCEDURE — 99024 POSTOP FOLLOW-UP VISIT: CPT | Performed by: SURGERY

## 2019-05-31 NOTE — PROGRESS NOTES
Medical Nutrition Therapy  Metabolic and Bariatric surgery  1 month after surgery follow up note         Pt reports:         Vitals:   Vitals:    05/31/19 1327   BP: 114/72   Site: Right Upper Arm   Position: Sitting   Cuff Size: Large Adult   Pulse: 66   Resp: 20   Weight: 254 lb (115.2 kg)   Height: 5' 5\" (1.651 m)      Body mass index is 42.27 kg/m². Labs reviewed:     Multivitamin/mineral intake:2 mvi  Calcium intake:   2 calcium  Other:            Nutrition Assessment:   PES: Inadequate food and beverage intake r/t WLS as evidenced by loss of excess body weight lost 26 lbs over one month.       Goals   60-80gm of protein  48-64oz of fluid     [x] met     []  Not met        Plan:  Questions answered re diet advancement and tolerance  F/u 3 months after surgery         Angelina Francis

## 2019-06-02 NOTE — PROGRESS NOTES
[facility]  [facility]  [address]  [address]  [place] [address]  Dept: [phone]    SURGICAL WEIGHT LOSS MANAGEMENT PROGRAM  PROGRESS NOTE     CC: Weight Loss    Patient: Urbano Acosta      Service Date: 5/31/2019  Visit:  1 month   Medical Record #: [id number]  Date of Surgery:   4/16/2019    Reason for Visit: Routine Post-Operative:  [] New Problem /   [] FU of existing problem    Patient here for 6 week visit after gastric bypass. No abdominal pain. No nausea. Tolerates meats. Taking vitamins. Exercising    Height: 5' 5\" (1.651 m)  Highest Weight:   280 lbs    Current Visit Weight Information  Weight: 254 lb (115.2 kg)   BMI: Body mass index is 42.27 kg/m². Weight loss since surgery:     36    1 wk - D/C'd home on VTE Prohylaxis? [x] Yes   [] No   On VTE Proph as directed? [] Yes   [x] No     [Labs to be drawn prior to 1m, 3m, 6m, 12m, 18m, and annual visits]    Liver pathology:    [x] NA    [] No Gross path    [] Liver Steatosis   [] Discussed w/ pt   [] Referred to GI    Exercising?    [] Yes    [x] No       Review of Systems:     General  Negative for: [] Weight Change   [x] Fatigue   [x] Fevers & Chills    [] Appetite change [] Other:    Positive for: [x] Weight Change   [] Fatigue   [] Fevers & Chills    [] Appetite change [] Other:   Cardiac  Negative for: [x] Chest Pain   [] Difficulty Breathing   [] Leg Cramps [x] Edema of Lower Extremeties    [] Left   [] Right      Positive for: [] Chest Pain   [] Difficulty Breathing   [] Leg Cramps [] Edema of Lower Extremeties    [] Left   [] Right   Pulmonary  Negative for: [x] Shortness of Breath [] Wheeze [x] Cough  [] Calf Pain     Positive for: [] Shortness of Breath [] Wheeze [] Cough  [] Calf Pain   Gastro-Intestinal Negative for: [] Heartburn   [] Reflux   [x] Dysphagia   [x] Melena   [x] BRBPR  [x] Vomiting   [x] Abdominal Pain   [x] Diarrhea     [x] Constipation  [] Other:     Positive for: [] Heartburn   [] Reflux   [] Dysphagia   [] Melena   [] BRBPR  [] Vomiting   [] Abdominal Pain   [] Diarrhea     [] Constipation  [] Other:    Muskuloskeletal Negative for: [x] Joint pain   [] Back pain   [] Knee Pain   [x] Muscle weakness [x] Hernia   [] Edema [] Other:     Positive for: [] Joint pain   [] Back pain   [] Knee Pain   [] Muscle weakness [] Hernia   [] Edema [] Other:    Neurologic Negative for: [x] Syncope   [x] Insomnia   [] Being treated for depression  [] Other:     Positive for: [] Syncope   [] Insomnia   [] Being treated for depression  [] Other:    Skin Negative for: [x] Wound:   [] Open   [] Draining   [] Incisional     [x] Rash   [] Hair Loss  [] Other:     Positive for: [] Wound:   [] Open   [] Draining    [] Incisional  [] Rash   [] Hair Loss  [] Other:          Physical Assessment:     /72 (Site: Right Upper Arm, Position: Sitting, Cuff Size: Large Adult)   Pulse 66   Resp 20   Ht 5' 5\" (1.651 m)   Wt 254 lb (115.2 kg)   BMI 42.27 kg/m²   General Negative for:  [x] Lapses in memory   [x] Unusual Stress   [] Diffic Concen [] Unable to sleep   [] Eating in response to stress   [] Other:    Positive for:  [] Lapses in memory   [] Unusual Stress   [] Diffic Concen [] Unable to sleep   [] Eating in response to stress   [] Other:   Cardio-Pulmonary   [x] Heart RRR   [x] No murmurs   [] Pulses nl x4 extrem    [x] Good inspiratory effort   [x] No wheezing     [x] Lungs clear to auscultation bilaterally    [] Other:    Gastro-Intestinal   [x] Abd S/NT/ND/Benign   [x] No abdominal mass/hernia    [x] No Splenomegaly    [] Other:    Muskuloskeletal   [x] Good muscle strength x4 extremities   [x] nl gait and ambul    [x] Nl ROM x4 extremities    [] Other:    Neurologic   [x] Alert and oriented x3    [] Other:   Skin   [x] Intact w/ no open wounds   [x] Incisions C/D/I    [] Steri strips removed   [x] No drainage or Infection    [] Other:      Assessment & Plan:      1.  Status post bariatric surgery            [x] Actigal: [] NA   [] S/p Cholecystectomy  [] Continue 3 months post-op  [] Finished    [x] Advance Diet    [x] Daily protein (65-75gm/day)   [x] Take Vitamins   [x] Attend Support Group    [x] Exercise Regularly     [x] Use contraception:    [] NA    [] s/p Hysterectomy   [] s/p Tubal ligation   [] Other:     Miralax daily for constipation    Ambulation    Actigal    Follow up in 2 month    Exercise    Dietician visit today  Follow up: Return in about 2 months (around 7/31/2019). Orders placed this encounter:   Orders Placed This Encounter   Procedures    Zinc    Comprehensive Metabolic Panel    TSH without Reflex    T4, Free    Hemoglobin A1C    Vitamin B12 & Folate    Vitamin B1    Vitamin D 25 Hydroxy    Magnesium    Iron and TIBC    Vitamin A    Lipid Panel    PTH, Intact    CBC Auto Differential       New Prescriptions:   No orders of the defined types were placed in this encounter. Electronically signed by Margot Schilling DO on 6/2/2019 at 2:10 PM    Please note that this chart was generated using voice recognition Dragon dictation software. Although every effort was made to ensure the accuracy of this automated transcription, some errors in transcription may have occurred.

## 2019-08-26 ENCOUNTER — HOSPITAL ENCOUNTER (OUTPATIENT)
Age: 39
Discharge: HOME | End: 2019-08-26
Payer: COMMERCIAL

## 2019-08-26 DIAGNOSIS — Z98.84 STATUS POST BARIATRIC SURGERY: ICD-10-CM

## 2019-08-26 LAB
ABSOLUTE EOS #: 0.07 K/UL (ref 0–0.44)
ABSOLUTE IMMATURE GRANULOCYTE: <0.03 K/UL (ref 0–0.3)
ABSOLUTE LYMPH #: 1.96 K/UL (ref 1.1–3.7)
ABSOLUTE MONO #: 0.3 K/UL (ref 0.1–1.2)
ALBUMIN SERPL-MCNC: 4.2 G/DL (ref 3.5–5.2)
ALBUMIN/GLOBULIN RATIO: 1.3 (ref 1–2.5)
ALP BLD-CCNC: 97 U/L (ref 35–104)
ALT SERPL-CCNC: 27 U/L (ref 5–33)
ANION GAP SERPL CALCULATED.3IONS-SCNC: 9 MMOL/L (ref 9–17)
AST SERPL-CCNC: 21 U/L
BASOPHILS # BLD: 0 % (ref 0–2)
BASOPHILS ABSOLUTE: <0.03 K/UL (ref 0–0.2)
BILIRUB SERPL-MCNC: 0.28 MG/DL (ref 0.3–1.2)
BUN BLDV-MCNC: 10 MG/DL (ref 6–20)
BUN/CREAT BLD: ABNORMAL (ref 9–20)
CALCIUM SERPL-MCNC: 9.5 MG/DL (ref 8.6–10.4)
CHLORIDE BLD-SCNC: 101 MMOL/L (ref 98–107)
CHOLESTEROL/HDL RATIO: 4
CHOLESTEROL: 171 MG/DL
CO2: 27 MMOL/L (ref 20–31)
CREAT SERPL-MCNC: 0.6 MG/DL (ref 0.5–0.9)
DIFFERENTIAL TYPE: NORMAL
EOSINOPHILS RELATIVE PERCENT: 1 % (ref 1–4)
ESTIMATED AVERAGE GLUCOSE: 103 MG/DL
FOLATE: 8.9 NG/ML
GFR AFRICAN AMERICAN: >60 ML/MIN
GFR NON-AFRICAN AMERICAN: >60 ML/MIN
GFR SERPL CREATININE-BSD FRML MDRD: ABNORMAL ML/MIN/{1.73_M2}
GFR SERPL CREATININE-BSD FRML MDRD: ABNORMAL ML/MIN/{1.73_M2}
GLUCOSE BLD-MCNC: 93 MG/DL (ref 70–99)
HBA1C MFR BLD: 5.2 % (ref 4–6)
HCT VFR BLD CALC: 41.3 % (ref 36.3–47.1)
HDLC SERPL-MCNC: 43 MG/DL
HEMOGLOBIN: 12.8 G/DL (ref 11.9–15.1)
IMMATURE GRANULOCYTES: 0 %
IRON SATURATION: 21 % (ref 20–55)
IRON: 54 UG/DL (ref 37–145)
LDL CHOLESTEROL: 109 MG/DL (ref 0–130)
LYMPHOCYTES # BLD: 32 % (ref 24–43)
MAGNESIUM: 2.1 MG/DL (ref 1.6–2.6)
MCH RBC QN AUTO: 28.3 PG (ref 25.2–33.5)
MCHC RBC AUTO-ENTMCNC: 31 G/DL (ref 28.4–34.8)
MCV RBC AUTO: 91.2 FL (ref 82.6–102.9)
MONOCYTES # BLD: 5 % (ref 3–12)
NRBC AUTOMATED: 0 PER 100 WBC
PDW BLD-RTO: 12.3 % (ref 11.8–14.4)
PLATELET # BLD: 283 K/UL (ref 138–453)
PLATELET ESTIMATE: NORMAL
PMV BLD AUTO: 11.3 FL (ref 8.1–13.5)
POTASSIUM SERPL-SCNC: 4 MMOL/L (ref 3.7–5.3)
PTH INTACT: 76.49 PG/ML (ref 15–65)
RBC # BLD: 4.53 M/UL (ref 3.95–5.11)
RBC # BLD: NORMAL 10*6/UL
SEG NEUTROPHILS: 62 % (ref 36–65)
SEGMENTED NEUTROPHILS ABSOLUTE COUNT: 3.75 K/UL (ref 1.5–8.1)
SODIUM BLD-SCNC: 137 MMOL/L (ref 135–144)
THYROXINE, FREE: 1.17 NG/DL (ref 0.93–1.7)
TOTAL IRON BINDING CAPACITY: 258 UG/DL (ref 250–450)
TOTAL PROTEIN: 7.4 G/DL (ref 6.4–8.3)
TRIGL SERPL-MCNC: 95 MG/DL
TSH SERPL DL<=0.05 MIU/L-ACNC: 1.57 MIU/L (ref 0.3–5)
UNSATURATED IRON BINDING CAPACITY: 204 UG/DL (ref 112–347)
VITAMIN B-12: 330 PG/ML (ref 232–1245)
VITAMIN D 25-HYDROXY: 21.4 NG/ML (ref 30–100)
VLDLC SERPL CALC-MCNC: NORMAL MG/DL (ref 1–30)
WBC # BLD: 6.1 K/UL (ref 3.5–11.3)
WBC # BLD: NORMAL 10*3/UL

## 2019-08-26 PROCEDURE — 83036 HEMOGLOBIN GLYCOSYLATED A1C: CPT

## 2019-08-26 PROCEDURE — 36415 COLL VENOUS BLD VENIPUNCTURE: CPT

## 2019-08-26 PROCEDURE — 82607 VITAMIN B-12: CPT

## 2019-08-26 PROCEDURE — 85025 COMPLETE CBC W/AUTO DIFF WBC: CPT

## 2019-08-26 PROCEDURE — 83735 ASSAY OF MAGNESIUM: CPT

## 2019-08-26 PROCEDURE — 84443 ASSAY THYROID STIM HORMONE: CPT

## 2019-08-26 PROCEDURE — 83540 ASSAY OF IRON: CPT

## 2019-08-26 PROCEDURE — 82746 ASSAY OF FOLIC ACID SERUM: CPT

## 2019-08-26 PROCEDURE — 83970 ASSAY OF PARATHORMONE: CPT

## 2019-08-26 PROCEDURE — 80061 LIPID PANEL: CPT

## 2019-08-26 PROCEDURE — 83550 IRON BINDING TEST: CPT

## 2019-08-26 PROCEDURE — 84425 ASSAY OF VITAMIN B-1: CPT

## 2019-08-26 PROCEDURE — 84439 ASSAY OF FREE THYROXINE: CPT

## 2019-08-26 PROCEDURE — 84630 ASSAY OF ZINC: CPT

## 2019-08-26 PROCEDURE — 84590 ASSAY OF VITAMIN A: CPT

## 2019-08-26 PROCEDURE — 82306 VITAMIN D 25 HYDROXY: CPT

## 2019-08-26 PROCEDURE — 80053 COMPREHEN METABOLIC PANEL: CPT

## 2019-08-28 LAB — ZINC: 68.5 UG/DL (ref 60–120)

## 2019-08-28 RX ORDER — ERGOCALCIFEROL 1.25 MG/1
50000 CAPSULE ORAL WEEKLY
Qty: 8 CAPSULE | Refills: 0 | Status: SHIPPED | OUTPATIENT
Start: 2019-08-28 | End: 2020-06-05 | Stop reason: SDUPTHER

## 2019-08-29 ENCOUNTER — OFFICE VISIT (OUTPATIENT)
Dept: BARIATRICS/WEIGHT MGMT | Age: 39
End: 2019-08-29
Payer: COMMERCIAL

## 2019-08-29 VITALS
SYSTOLIC BLOOD PRESSURE: 118 MMHG | RESPIRATION RATE: 20 BRPM | HEIGHT: 65 IN | BODY MASS INDEX: 38.82 KG/M2 | HEART RATE: 56 BPM | DIASTOLIC BLOOD PRESSURE: 82 MMHG | WEIGHT: 233 LBS

## 2019-08-29 DIAGNOSIS — Z98.84 STATUS POST BARIATRIC SURGERY: ICD-10-CM

## 2019-08-29 DIAGNOSIS — G47.33 OBSTRUCTIVE SLEEP APNEA: Primary | ICD-10-CM

## 2019-08-29 LAB
RETINYL PALMITATE: <0.02 MG/L (ref 0–0.1)
VITAMIN A LEVEL: 0.41 MG/L (ref 0.3–1.2)
VITAMIN A, INTERP: NORMAL

## 2019-08-29 PROCEDURE — G8427 DOCREV CUR MEDS BY ELIG CLIN: HCPCS | Performed by: SURGERY

## 2019-08-29 PROCEDURE — 99213 OFFICE O/P EST LOW 20 MIN: CPT | Performed by: SURGERY

## 2019-08-29 PROCEDURE — 1036F TOBACCO NON-USER: CPT | Performed by: SURGERY

## 2019-08-29 PROCEDURE — G8417 CALC BMI ABV UP PARAM F/U: HCPCS | Performed by: SURGERY

## 2019-08-30 LAB — VITAMIN B1 WHOLE BLOOD: 83 NMOL/L (ref 70–180)

## 2019-09-02 NOTE — PROGRESS NOTES
sleep apnea    2. Status post bariatric surgery            [x] Actigal: [] NA   [] S/p Cholecystectomy  [] Continue 3 months post-op  [] Finished    [x] Advance Diet    [x] Daily protein (65-75gm/day)   [x] Take Vitamins   [x] Attend Support Group    [x] Exercise Regularly     [x] Use contraception:    [] NA    [] s/p Hysterectomy   [] s/p Tubal ligation   [] Other:     Exercise    Wear CPAP to assist weight loss    Follow up in 3 months    Exercise    PPI    Dietician visit today  Follow up: Return in about 3 months (around 11/29/2019) for Post-Op. Orders placed this encounter:   No orders of the defined types were placed in this encounter. New Prescriptions:   No orders of the defined types were placed in this encounter. Electronically signed by Vi Naranjo DO on 9/2/2019 at 1:00 PM    Please note that this chart was generated using voice recognition Dragon dictation software. Although every effort was made to ensure the accuracy of this automated transcription, some errors in transcription may have occurred.

## 2019-12-26 ENCOUNTER — OFFICE VISIT (OUTPATIENT)
Dept: BARIATRICS/WEIGHT MGMT | Age: 39
End: 2019-12-26
Payer: COMMERCIAL

## 2019-12-26 VITALS
RESPIRATION RATE: 20 BRPM | HEART RATE: 64 BPM | WEIGHT: 222 LBS | BODY MASS INDEX: 36.99 KG/M2 | HEIGHT: 65 IN | DIASTOLIC BLOOD PRESSURE: 72 MMHG | SYSTOLIC BLOOD PRESSURE: 110 MMHG

## 2019-12-26 DIAGNOSIS — Z98.84 STATUS POST GASTRIC BYPASS FOR OBESITY: ICD-10-CM

## 2019-12-26 DIAGNOSIS — E66.9 OBESITY (BMI 30-39.9): ICD-10-CM

## 2019-12-26 DIAGNOSIS — Z86.59 HISTORY OF DEPRESSION: ICD-10-CM

## 2019-12-26 DIAGNOSIS — G47.33 OSA (OBSTRUCTIVE SLEEP APNEA): Primary | ICD-10-CM

## 2019-12-26 PROBLEM — G89.29 CHRONIC BACK PAIN: Status: RESOLVED | Noted: 2018-08-20 | Resolved: 2019-12-26

## 2019-12-26 PROBLEM — K21.9 GERD WITHOUT ESOPHAGITIS: Status: RESOLVED | Noted: 2018-08-20 | Resolved: 2019-12-26

## 2019-12-26 PROBLEM — K59.00 CONSTIPATION: Status: RESOLVED | Noted: 2019-04-22 | Resolved: 2019-12-26

## 2019-12-26 PROBLEM — E66.01 OBESITY, CLASS III, BMI 40-49.9 (MORBID OBESITY) (HCC): Status: RESOLVED | Noted: 2018-08-20 | Resolved: 2019-12-26

## 2019-12-26 PROBLEM — M54.9 CHRONIC BACK PAIN: Status: RESOLVED | Noted: 2018-08-20 | Resolved: 2019-12-26

## 2019-12-26 PROBLEM — R10.9 ABDOMINAL PAIN: Status: RESOLVED | Noted: 2019-04-22 | Resolved: 2019-12-26

## 2019-12-26 PROBLEM — E66.813 OBESITY, CLASS III, BMI 40-49.9 (MORBID OBESITY) (HCC): Status: RESOLVED | Noted: 2018-08-20 | Resolved: 2019-12-26

## 2019-12-26 PROCEDURE — G8417 CALC BMI ABV UP PARAM F/U: HCPCS | Performed by: NURSE PRACTITIONER

## 2019-12-26 PROCEDURE — 99213 OFFICE O/P EST LOW 20 MIN: CPT | Performed by: NURSE PRACTITIONER

## 2019-12-26 PROCEDURE — 1036F TOBACCO NON-USER: CPT | Performed by: NURSE PRACTITIONER

## 2019-12-26 PROCEDURE — G8484 FLU IMMUNIZE NO ADMIN: HCPCS | Performed by: NURSE PRACTITIONER

## 2019-12-26 PROCEDURE — G8427 DOCREV CUR MEDS BY ELIG CLIN: HCPCS | Performed by: NURSE PRACTITIONER

## 2020-05-27 LAB
BASOPHILS ABSOLUTE: 0.01 /ΜL
BASOPHILS RELATIVE PERCENT: 0.2 %
EOSINOPHILS ABSOLUTE: 0.7 /ΜL
EOSINOPHILS RELATIVE PERCENT: 1.2 %
FERRITIN: 39.7 NG/ML (ref 9–150)
HCT VFR BLD CALC: 39.5 % (ref 36–46)
HEMOGLOBIN: 12.9 G/DL (ref 12–16)
LYMPHOCYTES ABSOLUTE: 2.15 /ΜL
LYMPHOCYTES RELATIVE PERCENT: 37 %
MCH RBC QN AUTO: 29.8 PG
MCHC RBC AUTO-ENTMCNC: 32.7 G/DL
MCV RBC AUTO: 91.2 FL
MONOCYTES ABSOLUTE: 0.29 /ΜL
MONOCYTES RELATIVE PERCENT: 5 %
NEUTROPHILS ABSOLUTE: 3.28 /ΜL
NEUTROPHILS RELATIVE PERCENT: 56.4 %
PDW BLD-RTO: 40.2 %
PLATELET # BLD: 232 K/ΜL
PMV BLD AUTO: NORMAL FL
RBC # BLD: 4.33 10^6/ΜL
VITAMIN D 25-HYDROXY: 11.7
VITAMIN D2, 25 HYDROXY: NORMAL
VITAMIN D3,25 HYDROXY: NORMAL
WBC # BLD: 5.81 10^3/ML

## 2020-06-05 ENCOUNTER — OFFICE VISIT (OUTPATIENT)
Dept: BARIATRICS/WEIGHT MGMT | Age: 40
End: 2020-06-05
Payer: COMMERCIAL

## 2020-06-05 VITALS
DIASTOLIC BLOOD PRESSURE: 72 MMHG | BODY MASS INDEX: 35.32 KG/M2 | TEMPERATURE: 98.2 F | RESPIRATION RATE: 20 BRPM | SYSTOLIC BLOOD PRESSURE: 110 MMHG | WEIGHT: 212 LBS | HEIGHT: 65 IN | HEART RATE: 64 BPM

## 2020-06-05 PROCEDURE — 99213 OFFICE O/P EST LOW 20 MIN: CPT | Performed by: SURGERY

## 2020-06-05 PROCEDURE — G8417 CALC BMI ABV UP PARAM F/U: HCPCS | Performed by: SURGERY

## 2020-06-05 PROCEDURE — 1036F TOBACCO NON-USER: CPT | Performed by: SURGERY

## 2020-06-05 PROCEDURE — G8427 DOCREV CUR MEDS BY ELIG CLIN: HCPCS | Performed by: SURGERY

## 2020-06-05 RX ORDER — CLONAZEPAM 0.5 MG/1
0.5 TABLET ORAL 2 TIMES DAILY PRN
COMMUNITY

## 2020-06-05 RX ORDER — ERGOCALCIFEROL 1.25 MG/1
50000 CAPSULE ORAL WEEKLY
Qty: 8 CAPSULE | Refills: 0 | Status: SHIPPED | OUTPATIENT
Start: 2020-06-05 | End: 2022-06-15 | Stop reason: ALTCHOICE

## 2020-06-05 RX ORDER — DEXTROAMPHETAMINE SACCHARATE, AMPHETAMINE ASPARTATE MONOHYDRATE, DEXTROAMPHETAMINE SULFATE AND AMPHETAMINE SULFATE 5; 5; 5; 5 MG/1; MG/1; MG/1; MG/1
CAPSULE, EXTENDED RELEASE ORAL
COMMUNITY
Start: 2020-05-21

## 2020-06-05 NOTE — PROGRESS NOTES
Medical Nutrition Therapy  Metabolic and Bariatric surgery  One year after surgery         Pt reports: continues to be inconsistent with vitamin and calcium. Vitals:   Vitals:    06/05/20 1547   BP: 110/72   Site: Right Upper Arm   Position: Sitting   Cuff Size: Large Adult   Pulse: 64   Resp: 20   Temp: 98.2 °F (36.8 °C)   TempSrc: Infrared   Weight: 212 lb (96.2 kg)   Height: 5' 5\" (1.651 m)      Body mass index is 35.28 kg/m². Labs reviewed:     Multivitamin/mineral intake:1 BA Ultramulticomplete with iron capsule daily (2-3 times per week)  Calcium intake:   Tums           Nutrition Assessment:   PES: Inadequate food and beverage intake r/t WLS as evidenced by loss of excess body weight lost 10 lbs over 6 months. Goals   60-80gm of protein  48-64oz of fluid  Vitamin adherance  Basic adherance to WLS behavious and this document has been scanned into the chart.        [] met     [x]  Not met        Plan:   F/u 18 months post op         Melly Nath

## 2020-06-12 NOTE — PROGRESS NOTES
[x] Constipation  [] Other:     Positive for: [] Heartburn   [] Reflux   [] Dysphagia   [] Melena   [] BRBPR  [] Vomiting   [] Abdominal Pain   [] Diarrhea     [] Constipation  [] Other:    Muskuloskeletal Negative for: [x] Joint pain   [] Back pain   [] Knee Pain   [x] Muscle weakness [x] Hernia   [x] Edema [] Other:     Positive for: [] Joint pain   [] Back pain   [] Knee Pain   [] Muscle weakness [] Hernia   [] Edema [] Other:    Neurologic Negative for: [x] Syncope   [] Insomnia   [] Being treated for depression  [] Other:     Positive for: [] Syncope   [] Insomnia   [x] Being treated for depression  [] Other:    Skin Negative for: [] Wound:   [] Open   [] Draining   [] Incisional     [x] Rash   [x] Hair Loss  [] Other:     Positive for: [] Wound:   [] Open   [] Draining    [] Incisional  [] Rash   [] Hair Loss  [] Other:          Physical Assessment:     /72 (Site: Right Upper Arm, Position: Sitting, Cuff Size: Large Adult)   Pulse 64   Temp 98.2 °F (36.8 °C) (Infrared)   Resp 20   Ht 5' 5\" (1.651 m)   Wt 212 lb (96.2 kg)   BMI 35.28 kg/m²   Constitutional:  Vital signs are normal. The patient appears well-developed and well-nourished. HEENT:   Head: Normocephalic. Atraumatic  Eyes: pupils are equal and reactive. No scleral icterus is present. Neck: No mass and no thyromegaly present. Cardiovascular: Normal rate, regular rhythm, S1 normal and S2 normal.  Radial pulses present   Pulmonary/Chest: Effort normal and breath sounds normal. No retractions  Abdominal: Soft. Normal appearance. There is no organomegaly. No tenderness. There is no rigidity, no rebound, no guarding and no Stein's sign. Musculoskeletal:        Right lower leg: Normal. No tenderness and no edema. Left lower leg: Normal. No tenderness and no edema. Neurological: The patient is alert and oriented. Moving all 4 extremities, sensation grossly intact bilateral  Skin: Skin is warm, dry and intact.    Psychiatric: The patient has a normal mood and affect. Speech is normal and behavior is normal. Judgment and thought content normal. Cognition and memory are normal.     Assessment & Plan:      1. CURLY (obstructive sleep apnea)    2. Obesity (BMI 30-39. 9)              [x] Advance Diet    [x] Daily protein (65-75gm/day)   [x] Take Vitamins   [x] Attend Support Group    [x] Exercise Regularly     [x] Use contraception:    [] NA    [] s/p Hysterectomy   [] s/p Tubal ligation   [] Other:     Exercise, needs to increase    Limit carbs    Wear CPAP to assist weight loss    Follow up in 6 months    Exercise    Stop PPI    Dietician visit today  Follow up: No follow-ups on file. Orders placed this encounter:   No orders of the defined types were placed in this encounter. New Prescriptions:   No orders of the defined types were placed in this encounter. Electronically signed by Alicia Spencer DO on 6/14/2020 at 9:40 PM    Please note that this chart was generated using voice recognition Dragon dictation software. Although every effort was made to ensure the accuracy of this automated transcription, some errors in transcription may have occurred.

## 2020-10-08 ENCOUNTER — OFFICE VISIT (OUTPATIENT)
Dept: BARIATRICS/WEIGHT MGMT | Age: 40
End: 2020-10-08
Payer: COMMERCIAL

## 2020-10-08 VITALS
TEMPERATURE: 97.8 F | SYSTOLIC BLOOD PRESSURE: 102 MMHG | BODY MASS INDEX: 32.32 KG/M2 | HEART RATE: 60 BPM | WEIGHT: 194 LBS | DIASTOLIC BLOOD PRESSURE: 60 MMHG | HEIGHT: 65 IN

## 2020-10-08 PROCEDURE — G8427 DOCREV CUR MEDS BY ELIG CLIN: HCPCS | Performed by: SURGERY

## 2020-10-08 PROCEDURE — G8417 CALC BMI ABV UP PARAM F/U: HCPCS | Performed by: SURGERY

## 2020-10-08 PROCEDURE — G8484 FLU IMMUNIZE NO ADMIN: HCPCS | Performed by: SURGERY

## 2020-10-08 PROCEDURE — 1036F TOBACCO NON-USER: CPT | Performed by: SURGERY

## 2020-10-08 PROCEDURE — 99213 OFFICE O/P EST LOW 20 MIN: CPT | Performed by: SURGERY

## 2020-10-08 RX ORDER — M-VIT,TX,IRON,MINS/CALC/FOLIC 27MG-0.4MG
TABLET ORAL
COMMUNITY

## 2020-10-08 NOTE — PROGRESS NOTES
Medical Nutrition Therapy  Metabolic and Bariatric surgery  Annual follow up note         Pt reports: MVI BA capsules if taking more than one feels nausea        Vitals:   Vitals:    10/08/20 1518   BP: 102/60   Pulse: 60   Temp: 97.8 °F (36.6 °C)   Weight: 194 lb (88 kg)   Height: 5' 5\" (1.651 m)      Body mass index is 32.28 kg/m². Labs reviewed:     Multivitamin/mineral intake:3  Calcium intake:   2  Other:            Nutrition Assessment:   PES: Inadequate food and beverage intake r/t WLS as evidenced by loss of excess body weight 86lb. Goals   60-80gm of protein  48-64oz of fluid  Vitamin adherance  Basic adherance to WLS behavious and this document has been scanned into the chart.        [x] met     []  Not met        Plan: Try OTC MVI without iron 2 daily and one BA capsule with iron; try taking mvi with iron with food or at bedtime; transition to bariatric MVI without iron; make sure get labs checked annually  F/u annually         Katy Escalante

## 2020-10-18 NOTE — PROGRESS NOTES
[facility]  [facility]  [address] [place] [address] [place]  [address] [facility] [address]  Dept: [phone]    SURGICAL WEIGHT LOSS MANAGEMENT PROGRAM  PROGRESS NOTE     CC: Weight Loss    Patient: Genoveva Chapa      Service Date: 10/8/2020  Visit:  21 month   Medical Record #: [id number]  Date of Surgery:   4/16/2019    Reason for Visit: Routine Post-Operative:  [] New Problem /   [] FU of existing problem    Patient here for 18 month visit after gastric bypass. No abdominal pain. No nausea or GERD. Tolerates diet without event. She does drink at times. Taking vitamins. Exercising more frequently. Improved her diet and lost significant weight    Height: 5' 5\" (1.651 m)  Highest Weight:   280 lbs    Current Visit Weight Information  Weight: 194 lb (88 kg)   BMI: Body mass index is 32.28 kg/m². Weight loss since surgery:     86    1 wk - D/C'd home on VTE Prohylaxis? [x] Yes   [] No   On VTE Proph as directed? [] Yes   [x] No     [Labs to be drawn prior to 1m, 3m, 6m, 12m, 18m, and annual visits]    Liver pathology:    [x] NA    [] No Gross path    [] Liver Steatosis   [] Discussed w/ pt   [] Referred to GI    Exercising?    [] Yes    [x] No       Review of Systems:     General  Negative for: [] Weight Change   [x] Fatigue   [x] Fevers & Chills    [] Appetite change [] Other:    Positive for: [x] Weight Change   [] Fatigue   [] Fevers & Chills    [] Appetite change [] Other:   Cardiac  Negative for: [x] Chest Pain   [] Difficulty Breathing   [] Leg Cramps [x] Edema of Lower Extremeties    [] Left   [] Right      Positive for: [] Chest Pain   [] Difficulty Breathing   [] Leg Cramps [] Edema of Lower Extremeties    [] Left   [] Right   Pulmonary  Negative for: [x] Shortness of Breath [] Wheeze [x] Cough  [] Calf Pain     Positive for: [] Shortness of Breath [] Wheeze [] Cough  [] Calf Pain   Gastro-Intestinal Negative for: [] Heartburn   [] Reflux   [] Dysphagia   [] Melena   [] BRBPR  [x] Vomiting   [x] Abdominal Pain   [] Diarrhea  [] Hernia    [x] Constipation  [] Other:     Positive for: [] Heartburn   [] Reflux   [] Dysphagia   [] Melena   [] BRBPR  [] Vomiting   [] Abdominal Pain   [] Diarrhea  [] Hernia    [] Constipation  [] Other:    Muskuloskeletal Negative for: [] Joint pain   [] Back pain   [] Knee Pain   [x] Muscle weakness   [x] Edema [] Other:     Positive for: [] Joint pain   [] Back pain   [] Knee Pain   [] Muscle weakness  [] Edema [] Other:    Neurologic Negative for: [x] Syncope   [x] Insomnia   [] Being treated for depression  [] Other:     Positive for: [] Syncope   [] Insomnia   [] Being treated for depression  [] Other:    Skin Negative for: [x] Wound:   [] Open   [] Draining   [] Incisional     [x] Rash   [] Hair Loss  [] Other:     Positive for: [] Wound:   [] Open   [] Draining    [] Incisional  [] Rash   [] Hair Loss  [] Other:          Physical Assessment:     /60   Pulse 60   Temp 97.8 °F (36.6 °C)   Ht 5' 5\" (1.651 m)   Wt 194 lb (88 kg)   BMI 32.28 kg/m²   Constitutional:  Vital signs are normal. The patient appears well-developed and well-nourished. HEENT:   Head: Normocephalic. Atraumatic  Eyes: pupils are equal and reactive. No scleral icterus is present. Neck: No mass and no thyromegaly present. Cardiovascular: Normal rate, regular rhythm, S1 normal and S2 normal.  Radial pulses present   Pulmonary/Chest: Effort normal and breath sounds normal. No retractions  Abdominal: Soft. Normal appearance. There is no organomegaly. No tenderness. There is no rigidity, no rebound, no guarding and no Stein's sign. Musculoskeletal:        Right lower leg: Normal. No tenderness and no edema. Left lower leg: Normal. No tenderness and no edema. Neurological: The patient is alert and oriented. Moving all 4 extremities, sensation grossly intact bilateral  Skin: Skin is warm, dry and intact.    Psychiatric: The patient has a normal mood and

## 2022-06-06 ENCOUNTER — TELEPHONE (OUTPATIENT)
Dept: BARIATRICS/WEIGHT MGMT | Age: 42
End: 2022-06-06

## 2022-06-06 ENCOUNTER — APPOINTMENT (OUTPATIENT)
Dept: CT IMAGING | Age: 42
DRG: 418 | End: 2022-06-06
Payer: COMMERCIAL

## 2022-06-06 ENCOUNTER — HOSPITAL ENCOUNTER (INPATIENT)
Age: 42
LOS: 2 days | Discharge: HOME | DRG: 418 | End: 2022-06-08
Attending: EMERGENCY MEDICINE | Admitting: SURGERY
Payer: COMMERCIAL

## 2022-06-06 DIAGNOSIS — R10.13 ABDOMINAL PAIN, EPIGASTRIC: Primary | ICD-10-CM

## 2022-06-06 DIAGNOSIS — R10.13 EPIGASTRIC ABDOMINAL PAIN: ICD-10-CM

## 2022-06-06 PROBLEM — R10.9 ABDOMINAL PAIN: Status: ACTIVE | Noted: 2022-06-06

## 2022-06-06 LAB
ABSOLUTE EOS #: 0.04 K/UL (ref 0–0.44)
ABSOLUTE IMMATURE GRANULOCYTE: <0.03 K/UL (ref 0–0.3)
ABSOLUTE LYMPH #: 2.11 K/UL (ref 1.1–3.7)
ABSOLUTE MONO #: 0.41 K/UL (ref 0.1–1.2)
ALBUMIN SERPL-MCNC: 4.3 G/DL (ref 3.5–5.2)
ALBUMIN/GLOBULIN RATIO: 1.7 (ref 1–2.5)
ALP BLD-CCNC: 76 U/L (ref 35–104)
ALT SERPL-CCNC: 23 U/L (ref 5–33)
ANION GAP SERPL CALCULATED.3IONS-SCNC: 11 MMOL/L (ref 9–17)
AST SERPL-CCNC: 21 U/L
BASOPHILS # BLD: 0 % (ref 0–2)
BASOPHILS ABSOLUTE: <0.03 K/UL (ref 0–0.2)
BILIRUB SERPL-MCNC: 0.83 MG/DL (ref 0.3–1.2)
BUN BLDV-MCNC: 13 MG/DL (ref 6–20)
CALCIUM SERPL-MCNC: 9.6 MG/DL (ref 8.6–10.4)
CHLORIDE BLD-SCNC: 105 MMOL/L (ref 98–107)
CO2: 23 MMOL/L (ref 20–31)
CREAT SERPL-MCNC: 0.47 MG/DL (ref 0.5–0.9)
EOSINOPHILS RELATIVE PERCENT: 1 % (ref 1–4)
GFR AFRICAN AMERICAN: >60 ML/MIN
GFR NON-AFRICAN AMERICAN: >60 ML/MIN
GFR SERPL CREATININE-BSD FRML MDRD: ABNORMAL ML/MIN/{1.73_M2}
GLUCOSE BLD-MCNC: 94 MG/DL (ref 70–99)
HCT VFR BLD CALC: 39.3 % (ref 36.3–47.1)
HEMOGLOBIN: 12.9 G/DL (ref 11.9–15.1)
IMMATURE GRANULOCYTES: 0 %
LACTIC ACID, WHOLE BLOOD: 1 MMOL/L (ref 0.7–2.1)
LIPASE: 36 U/L (ref 13–60)
LYMPHOCYTES # BLD: 28 % (ref 24–43)
MCH RBC QN AUTO: 30.2 PG (ref 25.2–33.5)
MCHC RBC AUTO-ENTMCNC: 32.8 G/DL (ref 28.4–34.8)
MCV RBC AUTO: 92 FL (ref 82.6–102.9)
MONOCYTES # BLD: 5 % (ref 3–12)
NRBC AUTOMATED: 0 PER 100 WBC
PDW BLD-RTO: 11.7 % (ref 11.8–14.4)
PLATELET # BLD: 228 K/UL (ref 138–453)
PMV BLD AUTO: 10.5 FL (ref 8.1–13.5)
POTASSIUM SERPL-SCNC: 3.9 MMOL/L (ref 3.7–5.3)
RBC # BLD: 4.27 M/UL (ref 3.95–5.11)
SEG NEUTROPHILS: 66 % (ref 36–65)
SEGMENTED NEUTROPHILS ABSOLUTE COUNT: 5.09 K/UL (ref 1.5–8.1)
SODIUM BLD-SCNC: 139 MMOL/L (ref 135–144)
TOTAL PROTEIN: 6.9 G/DL (ref 6.4–8.3)
WBC # BLD: 7.7 K/UL (ref 3.5–11.3)

## 2022-06-06 PROCEDURE — 6360000002 HC RX W HCPCS: Performed by: EMERGENCY MEDICINE

## 2022-06-06 PROCEDURE — 96375 TX/PRO/DX INJ NEW DRUG ADDON: CPT

## 2022-06-06 PROCEDURE — 99285 EMERGENCY DEPT VISIT HI MDM: CPT

## 2022-06-06 PROCEDURE — 1200000000 HC SEMI PRIVATE

## 2022-06-06 PROCEDURE — 96374 THER/PROPH/DIAG INJ IV PUSH: CPT

## 2022-06-06 PROCEDURE — 83690 ASSAY OF LIPASE: CPT

## 2022-06-06 PROCEDURE — 96372 THER/PROPH/DIAG INJ SC/IM: CPT

## 2022-06-06 PROCEDURE — 74177 CT ABD & PELVIS W/CONTRAST: CPT

## 2022-06-06 PROCEDURE — G0378 HOSPITAL OBSERVATION PER HR: HCPCS

## 2022-06-06 PROCEDURE — 80053 COMPREHEN METABOLIC PANEL: CPT

## 2022-06-06 PROCEDURE — 83605 ASSAY OF LACTIC ACID: CPT

## 2022-06-06 PROCEDURE — 6360000002 HC RX W HCPCS: Performed by: STUDENT IN AN ORGANIZED HEALTH CARE EDUCATION/TRAINING PROGRAM

## 2022-06-06 PROCEDURE — 2580000003 HC RX 258: Performed by: EMERGENCY MEDICINE

## 2022-06-06 PROCEDURE — 6360000004 HC RX CONTRAST MEDICATION: Performed by: STUDENT IN AN ORGANIZED HEALTH CARE EDUCATION/TRAINING PROGRAM

## 2022-06-06 PROCEDURE — 85025 COMPLETE CBC W/AUTO DIFF WBC: CPT

## 2022-06-06 PROCEDURE — 99223 1ST HOSP IP/OBS HIGH 75: CPT | Performed by: SURGERY

## 2022-06-06 RX ORDER — ENOXAPARIN SODIUM 100 MG/ML
40 INJECTION SUBCUTANEOUS EVERY 12 HOURS SCHEDULED
Status: DISCONTINUED | OUTPATIENT
Start: 2022-06-06 | End: 2022-06-08 | Stop reason: HOSPADM

## 2022-06-06 RX ORDER — FENTANYL CITRATE 50 UG/ML
50 INJECTION, SOLUTION INTRAMUSCULAR; INTRAVENOUS ONCE
Status: COMPLETED | OUTPATIENT
Start: 2022-06-06 | End: 2022-06-06

## 2022-06-06 RX ORDER — SODIUM CHLORIDE 0.9 % (FLUSH) 0.9 %
5-40 SYRINGE (ML) INJECTION PRN
Status: DISCONTINUED | OUTPATIENT
Start: 2022-06-06 | End: 2022-06-08 | Stop reason: HOSPADM

## 2022-06-06 RX ORDER — SODIUM CHLORIDE, SODIUM LACTATE, POTASSIUM CHLORIDE, CALCIUM CHLORIDE 600; 310; 30; 20 MG/100ML; MG/100ML; MG/100ML; MG/100ML
INJECTION, SOLUTION INTRAVENOUS CONTINUOUS
Status: DISCONTINUED | OUTPATIENT
Start: 2022-06-06 | End: 2022-06-08 | Stop reason: HOSPADM

## 2022-06-06 RX ORDER — MORPHINE SULFATE 4 MG/ML
4 INJECTION, SOLUTION INTRAMUSCULAR; INTRAVENOUS ONCE
Status: COMPLETED | OUTPATIENT
Start: 2022-06-06 | End: 2022-06-06

## 2022-06-06 RX ORDER — OXYCODONE HYDROCHLORIDE AND ACETAMINOPHEN 5; 325 MG/1; MG/1
1 TABLET ORAL EVERY 4 HOURS PRN
Status: DISCONTINUED | OUTPATIENT
Start: 2022-06-06 | End: 2022-06-08

## 2022-06-06 RX ORDER — SODIUM CHLORIDE 0.9 % (FLUSH) 0.9 %
5-40 SYRINGE (ML) INJECTION EVERY 12 HOURS SCHEDULED
Status: DISCONTINUED | OUTPATIENT
Start: 2022-06-06 | End: 2022-06-08 | Stop reason: HOSPADM

## 2022-06-06 RX ORDER — SODIUM CHLORIDE 9 MG/ML
INJECTION, SOLUTION INTRAVENOUS PRN
Status: DISCONTINUED | OUTPATIENT
Start: 2022-06-06 | End: 2022-06-08 | Stop reason: HOSPADM

## 2022-06-06 RX ORDER — ONDANSETRON 2 MG/ML
4 INJECTION INTRAMUSCULAR; INTRAVENOUS ONCE
Status: COMPLETED | OUTPATIENT
Start: 2022-06-06 | End: 2022-06-06

## 2022-06-06 RX ORDER — ACETAMINOPHEN 500 MG
1000 TABLET ORAL EVERY 8 HOURS PRN
Status: DISCONTINUED | OUTPATIENT
Start: 2022-06-06 | End: 2022-06-08 | Stop reason: HOSPADM

## 2022-06-06 RX ADMIN — IOPAMIDOL 75 ML: 755 INJECTION, SOLUTION INTRAVENOUS at 19:08

## 2022-06-06 RX ADMIN — MORPHINE SULFATE 4 MG: 4 INJECTION INTRAVENOUS at 20:42

## 2022-06-06 RX ADMIN — HYDROMORPHONE HYDROCHLORIDE 0.5 MG: 1 INJECTION, SOLUTION INTRAMUSCULAR; INTRAVENOUS; SUBCUTANEOUS at 23:23

## 2022-06-06 RX ADMIN — SODIUM CHLORIDE, PRESERVATIVE FREE 10 ML: 5 INJECTION INTRAVENOUS at 23:23

## 2022-06-06 RX ADMIN — FENTANYL CITRATE 50 MCG: 50 INJECTION, SOLUTION INTRAMUSCULAR; INTRAVENOUS at 16:54

## 2022-06-06 RX ADMIN — SODIUM CHLORIDE, POTASSIUM CHLORIDE, SODIUM LACTATE AND CALCIUM CHLORIDE: 600; 310; 30; 20 INJECTION, SOLUTION INTRAVENOUS at 23:22

## 2022-06-06 RX ADMIN — ENOXAPARIN SODIUM 40 MG: 100 INJECTION SUBCUTANEOUS at 23:31

## 2022-06-06 RX ADMIN — ONDANSETRON 4 MG: 2 INJECTION INTRAMUSCULAR; INTRAVENOUS at 16:53

## 2022-06-06 ASSESSMENT — ENCOUNTER SYMPTOMS
DIARRHEA: 0
CONSTIPATION: 0
ABDOMINAL PAIN: 1
SHORTNESS OF BREATH: 0
VOMITING: 0
COUGH: 0
NAUSEA: 1
BLOOD IN STOOL: 0
RHINORRHEA: 0
SORE THROAT: 0

## 2022-06-06 ASSESSMENT — PAIN DESCRIPTION - LOCATION: LOCATION: UMBILICUS;ABDOMEN

## 2022-06-06 ASSESSMENT — PAIN DESCRIPTION - ORIENTATION: ORIENTATION: LEFT;UPPER

## 2022-06-06 ASSESSMENT — PAIN DESCRIPTION - PAIN TYPE: TYPE: ACUTE PAIN

## 2022-06-06 ASSESSMENT — PAIN SCALES - GENERAL
PAINLEVEL_OUTOF10: 6
PAINLEVEL_OUTOF10: 8
PAINLEVEL_OUTOF10: 4

## 2022-06-06 ASSESSMENT — PAIN - FUNCTIONAL ASSESSMENT
PAIN_FUNCTIONAL_ASSESSMENT: PREVENTS OR INTERFERES SOME ACTIVE ACTIVITIES AND ADLS
PAIN_FUNCTIONAL_ASSESSMENT: 0-10

## 2022-06-06 NOTE — ED PROVIDER NOTES
[facility]  ED  Emergency Department  Emergency Medicine Resident Sign-out     Care of Ryanne Cabrera was assumed from Dr. Della Teresa and is being seen for Abdominal Pain (umbilical pain that radiates to the LUQ. gastric bypass 04/2019)  . The patient's initial evaluation and plan have been discussed with the prior provider who initially evaluated the patient. EMERGENCY DEPARTMENT COURSE / MEDICAL DECISION MAKING:       MEDICATIONS GIVEN:  Orders Placed This Encounter   Medications    fentaNYL (SUBLIMAZE) injection 50 mcg    ondansetron (ZOFRAN) injection 4 mg    iohexol (OMNIPAQUE 240) injection 50 mL    iopamidol (ISOVUE-370) 76 % injection 75 mL    morphine injection 4 mg       LABS / RADIOLOGY:     Labs Reviewed   CBC WITH AUTO DIFFERENTIAL - Abnormal; Notable for the following components:       Result Value    RDW 11.7 (*)     Seg Neutrophils 66 (*)     All other components within normal limits   COMPREHENSIVE METABOLIC PANEL W/ REFLEX TO MG FOR LOW K - Abnormal; Notable for the following components:    CREATININE 0.47 (*)     All other components within normal limits   LIPASE   LACTIC ACID   URINALYSIS WITH REFLEX TO CULTURE       No results found. RECENT VITALS:     Temp: 98.5 °F (36.9 °C),  Heart Rate: 62, Resp: 17, BP: 103/64, SpO2: 96 %      This patient is a 39 y.o. Female with biatric patient, abdominal pain and nausea, consutled bariatrics , [phone] (call sherice with results)      ED Course as of 06/06/22 2204 Mon Jun 06, 2022 2016 Mild nonspecific graying of the fat along the mesenteric root. [PS]   8964 D/w bariatrics   Will review case/CT [PS]      ED Course User Index  [PS] Heidy Sow MD       OUTSTANDING TASKS / RECOMMENDATIONS:    1. Ct pending  2. D/w bariatrics   3. admited to surgery     FINAL IMPRESSION:     1.  Abdominal pain, epigastric        DISPOSITION:         DISPOSITION:  []  Discharge   []  Transfer -    [x]  Admission -  surgery   [] Against Medical Advice   []  Eloped   FOLLOW-UP: No follow-up provider specified.    DISCHARGE MEDICATIONS: New Prescriptions    No medications on file          Mitra Dillard MD  Emergency Medicine Resident  [facility]       Mitra Dillard MD  Resident  06/06/22 6469

## 2022-06-06 NOTE — TELEPHONE ENCOUNTER
Next Visit Date:  Visit date not found    Patient Surgery Date  4/16/19    Type of  Surgery  bypass    Patient calls complaining of  Letting us know they are going to [facility] ER for abdominal pain on the ULQ . Onset: 4 day(s) ago  Timing: constant, intermittent  Severity: Moderate    Progression: increasing        Patient advised:   If  Symptoms worsen seek treatment at  Emergency Room. You will receive a call back from the office within 24-48 hours.     Is it ok to leave a message if we call back yes

## 2022-06-06 NOTE — ED PROVIDER NOTES
[facility]     Emergency Department     Faculty Attestation    I performed a history and physical examination of the patient and discussed management with the resident. I have reviewed and agree with the residents findings including all diagnostic interpretations, and treatment plans as written at the time of my review. Any areas of disagreement are noted on the chart. I was personally present for the key portions of any procedures. I have documented in the chart those procedures where I was not present during the key portions. For Physician Assistant/ Nurse Practitioner cases/documentation I have personally evaluated this patient and have completed at least one if not all key elements of the E/M (history, physical exam, and MDM). Additional findings are as noted. This patient was evaluated in the Emergency Department for symptoms described in the history of present illness. The patient was evaluated in the context of the global COVID-19 pandemic, which necessitated consideration that the patient might be at risk for infection with the SARS-CoV-2 virus that causes COVID-19. Institutional protocols and algorithms that pertain to the evaluation of patients at risk for COVID-19 are in a state of rapid change based on information released by regulatory bodies including the CDC and federal and state organizations. These policies and algorithms were followed during the patient's care in the ED. Primary Care Physician: Jason Lou MD    History: This is a 39 y.o. female who presents to the Emergency Department with complaint of abdominal pain. The patient presents to the emergency department with onset of abdominal pain with some associated nausea but denies any vomiting. Patient has had a history of gastric bypass surgery in 2019. Patient denies any dysuria, frequency urgency. Denies any constipation diarrhea.   Patient has had a hysterectomy. Physical:   height is 5' 4.5\" (1.638 m) and weight is 182 lb (82.6 kg). Her oral temperature is 98.5 °F (36.9 °C). Her blood pressure is 121/74 and her pulse is 79. Her respiration is 18 and oxygen saturation is 96%. Abdomen is soft she is tender in epigastric and left upper quadrant no tenderness in the lower abdominal areas. Impression: Abdominal pain    Plan: IV fluid, antiemetic, analgesia, CBC, CMP, lipase, lactic acid, CT scan, urinalysis, bariatric surgery consultation    (Please note that portions of this note were completed with a voice recognition program.  Efforts were made to edit the dictations but occasionally words are mis-transcribed.)    Clayton Clement.  Berry Deleon MD, [facility]  Attending Emergency Medicine Physician        Poonam Miranda MD  06/06/22 3318

## 2022-06-06 NOTE — ED TRIAGE NOTES
Pt has had a hx of bariatric sx and is having belly pain with nausea for the past few days to a week.

## 2022-06-06 NOTE — ED NOTES
3-4 days ago pt states she started having LUQ pain. Pt states that the pain starts in the umbilical area and radiates to the LUQ. Pt states that noticed bulging in her LUQ today. Pt states that she had gastric bypass a couple of years ago. Pt states no complaints from surgery until now.       Paras Chopra  06/06/22 6710

## 2022-06-06 NOTE — ED PROVIDER NOTES
[facility]  ED  Emergency Department Encounter  EmergencyMedicine Resident     Pt Name:Geoffery Miguel Severance  MRN: [id number]  [place] 1980  Date of evaluation: 6/6/22  PCP:  Storm Felipe MD    This patient was evaluated in the Emergency Department for symptoms described in the history of present illness. The patient was evaluated in the context of the global COVID-19 pandemic, which necessitated consideration that the patient might be at risk for infection with the SARS-CoV-2 virus that causes COVID-19. Institutional protocols and algorithms that pertain to the evaluation of patients at risk for COVID-19 are in a state of rapid change based on information released by regulatory bodies including the CDC and federal and state organizations. These policies and algorithms were followed during the patient's care in the ED. CHIEF COMPLAINT       Chief Complaint   Patient presents with    Abdominal Pain     umbilical pain that radiates to the LUQ. gastric bypass 04/2019       HISTORY OF PRESENT ILLNESS  (Location/Symptom, Timing/Onset, Context/Setting, Quality, Duration, Modifying Factors, Severity.)      Hemanth Soriano is a 39 y.o. female who presents with abdominal pain. Patient presents with 2 days of progressive worsening epigastric/left upper quadrant abdominal pain, moderate to severe, nonradiating, associated with nausea. Patient denies fevers, chills, cough, congestion, chest pain, shortness of breath, vomiting, diarrhea, constipation, dysuria, frequency, urgency, lower extremity swelling or pain. Patient has history of Heaven-en-Y gastric bypass in 2019 with bariatric surgery, hysterectomy. Patient called and bariatric surgery clinic who recommended her come to the emergency department for evaluation. Patient denies history of cardiac disease, hypertension, hyperlipidemia, diabetes.     PAST MEDICAL / SURGICAL / SOCIAL / FAMILY HISTORY      has a past medical history of Back pain, Depression, Migraines, and Sleep apnea. has a past surgical history that includes partial hysterectomy (cervix not removed); Upper gastrointestinal endoscopy (N/A, 11/23/2018); Hysterectomy; Heaven-en-Y Gastric Bypass (04/16/2019); and Heaven-en-Y Gastric Bypass (N/A, 4/16/2019). Social History     Socioeconomic History    Marital status:      Spouse name: Not on file    Number of children: Not on file    Years of education: Not on file    Highest education level: Not on file   Occupational History    Not on file   Tobacco Use    Smoking status: Never Smoker    Smokeless tobacco: Never Used   Vaping Use    Vaping Use: Never used   Substance and Sexual Activity    Alcohol use: Yes    Drug use: Yes     Types: Marijuana Veryl Gaines)     Comment: last used 6 months ago    Sexual activity: Not on file   Other Topics Concern    Not on file   Social History Narrative    Not on file     Social Determinants of Health     Financial Resource Strain:     Difficulty of Paying Living Expenses: Not on file   Food Insecurity:     Worried About Running Out of Food in the Last Year: Not on file    Frantz of Food in the Last Year: Not on file   Transportation Needs:     Lack of Transportation (Medical): Not on file    Lack of Transportation (Non-Medical):  Not on file   Physical Activity:     Days of Exercise per Week: Not on file    Minutes of Exercise per Session: Not on file   Stress:     Feeling of Stress : Not on file   Social Connections:     Frequency of Communication with Friends and Family: Not on file    Frequency of Social Gatherings with Friends and Family: Not on file    Attends Jain Services: Not on file    Active Member of Clubs or Organizations: Not on file    Attends Club or Organization Meetings: Not on file    Marital Status: Not on file   Intimate Partner Violence:     Fear of Current or Ex-Partner: Not on file    Emotionally Abused: Not on file    Physically Abused: Not on file    Sexually Abused: Not on file   Housing Stability:     Unable to Pay for Housing in the Last Year: Not on file    Number of Places Lived in the Last Year: Not on file    Unstable Housing in the Last Year: Not on file       Family History   Problem Relation Age of Onset    Arthritis Mother     Depression Mother     Heart Disease Father     Cancer Father        Allergies:  Patient has no known allergies. Home Medications:  Prior to Admission medications    Medication Sig Start Date End Date Taking? Authorizing Provider   Calcium Carbonate Antacid (TUMS PO) Take by mouth    Historical Provider, MD   Multiple Vitamins-Minerals (THERAPEUTIC MULTIVITAMIN-MINERALS) tablet Take by mouth    Historical Provider, MD   vitamin D (ERGOCALCIFEROL) 1.25 MG (09921 UT) CAPS capsule Take 1 capsule by mouth once a week for 8 doses 6/5/20 7/25/20  MATTI Washington CNP   amphetamine-dextroamphetamine (ADDERALL XR) 20 MG extended release capsule  5/21/20   Historical Provider, MD   clonazePAM (KLONOPIN) 0.5 MG tablet Take 0.5 mg by mouth 2 times daily as needed. Historical Provider, MD       REVIEW OF SYSTEMS    (2-9 systems for level 4, 10 or more for level 5)      Review of Systems   Constitutional: Negative for chills, diaphoresis, fatigue and fever. HENT: Negative for congestion, rhinorrhea and sore throat. Eyes: Negative for visual disturbance. Respiratory: Negative for cough and shortness of breath. Cardiovascular: Negative for chest pain, palpitations and leg swelling. Gastrointestinal: Positive for abdominal pain and nausea. Negative for blood in stool, constipation, diarrhea and vomiting. Genitourinary: Negative for dysuria, frequency, hematuria, urgency and vaginal bleeding. Musculoskeletal: Negative for neck pain and neck stiffness. Skin: Negative for pallor and rash. Neurological: Negative for dizziness, syncope, weakness, light-headedness and headaches. less than 2 seconds. Neurological:      Mental Status: She is alert and oriented to person, place, and time.    Psychiatric:         Mood and Affect: Mood normal.         Behavior: Behavior normal.         DIFFERENTIAL  DIAGNOSIS     PLAN (LABS / IMAGING / EKG):  Orders Placed This Encounter   Procedures    CT ABDOMEN PELVIS W IV CONTRAST Additional Contrast? Oral    CBC with Auto Differential    Comprehensive Metabolic Panel w/ Reflex to MG    Lipase    Lactic Acid    Urinalysis with Reflex to Culture    Inpatient Consult to Bariatrics       MEDICATIONS ORDERED:  Orders Placed This Encounter   Medications    fentaNYL (SUBLIMAZE) injection 50 mcg    ondansetron (ZOFRAN) injection 4 mg    iohexol (OMNIPAQUE 240) injection 50 mL    iopamidol (ISOVUE-370) 76 % injection 75 mL       DDX: Closed-loop obstruction, small bowel obstruction, diverticulitis, pancreatitis, gastritis    DIAGNOSTIC RESULTS / EMERGENCY DEPARTMENT COURSE / MDM   LAB RESULTS:  Results for orders placed or performed during the hospital encounter of 06/06/22   CBC with Auto Differential   Result Value Ref Range    WBC 7.7 3.5 - 11.3 k/uL    RBC 4.27 3.95 - 5.11 m/uL    Hemoglobin 12.9 11.9 - 15.1 g/dL    Hematocrit 39.3 36.3 - 47.1 %    MCV 92.0 82.6 - 102.9 fL    MCH 30.2 25.2 - 33.5 pg    MCHC 32.8 28.4 - 34.8 g/dL    RDW 11.7 (L) 11.8 - 14.4 %    Platelets 156 087 - 234 k/uL    MPV 10.5 8.1 - 13.5 fL    NRBC Automated 0.0 0.0 per 100 WBC    Seg Neutrophils 66 (H) 36 - 65 %    Lymphocytes 28 24 - 43 %    Monocytes 5 3 - 12 %    Eosinophils % 1 1 - 4 %    Basophils 0 0 - 2 %    Immature Granulocytes 0 0 %    Segs Absolute 5.09 1.50 - 8.10 k/uL    Absolute Lymph # 2.11 1.10 - 3.70 k/uL    Absolute Mono # 0.41 0.10 - 1.20 k/uL    Absolute Eos # 0.04 0.00 - 0.44 k/uL    Basophils Absolute <0.03 0.00 - 0.20 k/uL    Absolute Immature Granulocyte <0.03 0.00 - 0.30 k/uL   Comprehensive Metabolic Panel w/ Reflex to MG   Result Value Ref Range Glucose 94 70 - 99 mg/dL    BUN 13 6 - 20 mg/dL    CREATININE 0.47 (L) 0.50 - 0.90 mg/dL    Calcium 9.6 8.6 - 10.4 mg/dL    Sodium 139 135 - 144 mmol/L    Potassium 3.9 3.7 - 5.3 mmol/L    Chloride 105 98 - 107 mmol/L    CO2 23 20 - 31 mmol/L    Anion Gap 11 9 - 17 mmol/L    Alkaline Phosphatase 76 35 - 104 U/L    ALT 23 5 - 33 U/L    AST 21 <32 U/L    Total Bilirubin 0.83 0.3 - 1.2 mg/dL    Total Protein 6.9 6.4 - 8.3 g/dL    Albumin 4.3 3.5 - 5.2 g/dL    Albumin/Globulin Ratio 1.7 1.0 - 2.5    GFR Non-African American >60 >60 mL/min    GFR African American >60 >60 mL/min    GFR Comment         Lipase   Result Value Ref Range    Lipase 36 13 - 60 U/L   Lactic Acid   Result Value Ref Range    Lactic Acid, Whole Blood 1.0 0.7 - 2.1 mmol/L       IMPRESSION: 70-year-old female with history of Heaven-en-Y gastric bypass, hysterectomy, presenting with right upper quadrant/left upper quadrant abdominal tenderness, concern for postoperative changes from the gastric bypass, obstruction, closed-loop, will obtain CT imaging with IV and oral contrast.  Will consult with bariatrics. Will obtain abdominal labs to evaluate for anemia, electrolyte abnormality, liver/gallbladder etiology, pancreatitis. Will obtain urinalysis to eval for UTI. Will administer Zofran and fentanyl, reevaluate. RADIOLOGY:  Pending    EKG      All EKG's are interpreted by the Emergency Department Physician who either signs or Co-signs this chart in the absence of a cardiologist.    EMERGENCY DEPARTMENT COURSE:  Patient came to emergency department, HPI and physical exam were conducted. All nursing notes were reviewed. Discussed with bariatrics who reports that they would like to be notified when imaging is complete. Labs large unremarkable, CT imaging pending, care patient is handed off to Dr. Ghazala Felton. No notes of EC Admission Criteria type on file.     PROCEDURES:      CONSULTS:  IP CONSULT TO BARIATRICS    CRITICAL CARE:      FINAL IMPRESSION 1. Abdominal pain, epigastric          DISPOSITION / PLAN     DISPOSITION pending      PATIENT REFERRED TO:  No follow-up provider specified.     DISCHARGE MEDICATIONS:  New Prescriptions    No medications on file       Tisha Hernandez MD  Emergency Medicine Resident    (Please note that portions of thisnote were completed with a voice recognition program.  Efforts were made to edit the dictations but occasionally words are mis-transcribed.)        Tisha Hernandez MD  Resident  06/06/22 2014

## 2022-06-07 ENCOUNTER — ANESTHESIA (OUTPATIENT)
Dept: OPERATING ROOM | Age: 42
DRG: 418 | End: 2022-06-07
Payer: COMMERCIAL

## 2022-06-07 ENCOUNTER — ANESTHESIA EVENT (OUTPATIENT)
Dept: OPERATING ROOM | Age: 42
DRG: 418 | End: 2022-06-07
Payer: COMMERCIAL

## 2022-06-07 LAB
-: ABNORMAL
ALBUMIN SERPL-MCNC: 3.7 G/DL (ref 3.5–5.2)
ALBUMIN/GLOBULIN RATIO: 1.5 (ref 1–2.5)
ALP BLD-CCNC: 67 U/L (ref 35–104)
ALT SERPL-CCNC: 19 U/L (ref 5–33)
ANION GAP SERPL CALCULATED.3IONS-SCNC: 13 MMOL/L (ref 9–17)
AST SERPL-CCNC: 16 U/L
BACTERIA: ABNORMAL
BILIRUB SERPL-MCNC: 0.95 MG/DL (ref 0.3–1.2)
BILIRUBIN URINE: NEGATIVE
BUN BLDV-MCNC: 11 MG/DL (ref 6–20)
CALCIUM SERPL-MCNC: 9.2 MG/DL (ref 8.6–10.4)
CASTS UA: ABNORMAL /LPF (ref 0–8)
CHLORIDE BLD-SCNC: 103 MMOL/L (ref 98–107)
CO2: 23 MMOL/L (ref 20–31)
COLOR: YELLOW
CREAT SERPL-MCNC: 0.49 MG/DL (ref 0.5–0.9)
EPITHELIAL CELLS UA: ABNORMAL /HPF (ref 0–5)
GFR AFRICAN AMERICAN: >60 ML/MIN
GFR NON-AFRICAN AMERICAN: >60 ML/MIN
GFR SERPL CREATININE-BSD FRML MDRD: ABNORMAL ML/MIN/{1.73_M2}
GLUCOSE BLD-MCNC: 91 MG/DL (ref 70–99)
GLUCOSE URINE: NEGATIVE
HCT VFR BLD CALC: 35.7 % (ref 36.3–47.1)
HEMOGLOBIN: 11.9 G/DL (ref 11.9–15.1)
KETONES, URINE: ABNORMAL
LEUKOCYTE ESTERASE, URINE: ABNORMAL
MCH RBC QN AUTO: 30 PG (ref 25.2–33.5)
MCHC RBC AUTO-ENTMCNC: 33.3 G/DL (ref 28.4–34.8)
MCV RBC AUTO: 89.9 FL (ref 82.6–102.9)
NITRITE, URINE: POSITIVE
NRBC AUTOMATED: 0 PER 100 WBC
PDW BLD-RTO: 11.8 % (ref 11.8–14.4)
PH UA: 5.5 (ref 5–8)
PLATELET # BLD: 216 K/UL (ref 138–453)
PMV BLD AUTO: 10.5 FL (ref 8.1–13.5)
POTASSIUM SERPL-SCNC: 4.1 MMOL/L (ref 3.7–5.3)
PROTEIN UA: NEGATIVE
RBC # BLD: 3.97 M/UL (ref 3.95–5.11)
RBC UA: ABNORMAL /HPF (ref 0–4)
SODIUM BLD-SCNC: 139 MMOL/L (ref 135–144)
SPECIFIC GRAVITY UA: 1.03 (ref 1–1.03)
TOTAL PROTEIN: 6.1 G/DL (ref 6.4–8.3)
TURBIDITY: CLEAR
URINE HGB: NEGATIVE
UROBILINOGEN, URINE: NORMAL
WBC # BLD: 6.2 K/UL (ref 3.5–11.3)
WBC UA: ABNORMAL /HPF (ref 0–5)

## 2022-06-07 PROCEDURE — 0DQV4ZZ REPAIR MESENTERY, PERCUTANEOUS ENDOSCOPIC APPROACH: ICD-10-PCS | Performed by: SURGERY

## 2022-06-07 PROCEDURE — 8E0W4CZ ROBOTIC ASSISTED PROCEDURE OF TRUNK REGION, PERCUTANEOUS ENDOSCOPIC APPROACH: ICD-10-PCS | Performed by: SURGERY

## 2022-06-07 PROCEDURE — 6360000002 HC RX W HCPCS: Performed by: STUDENT IN AN ORGANIZED HEALTH CARE EDUCATION/TRAINING PROGRAM

## 2022-06-07 PROCEDURE — 3600000019 HC SURGERY ROBOT ADDTL 15MIN: Performed by: SURGERY

## 2022-06-07 PROCEDURE — 6360000002 HC RX W HCPCS: Performed by: ANESTHESIOLOGY

## 2022-06-07 PROCEDURE — 3700000000 HC ANESTHESIA ATTENDED CARE: Performed by: SURGERY

## 2022-06-07 PROCEDURE — 2580000003 HC RX 258: Performed by: STUDENT IN AN ORGANIZED HEALTH CARE EDUCATION/TRAINING PROGRAM

## 2022-06-07 PROCEDURE — 36415 COLL VENOUS BLD VENIPUNCTURE: CPT

## 2022-06-07 PROCEDURE — 2500000003 HC RX 250 WO HCPCS: Performed by: SURGERY

## 2022-06-07 PROCEDURE — 64488 TAP BLOCK BI INJECTION: CPT | Performed by: ANESTHESIOLOGY

## 2022-06-07 PROCEDURE — 87088 URINE BACTERIA CULTURE: CPT

## 2022-06-07 PROCEDURE — 47562 LAPAROSCOPIC CHOLECYSTECTOMY: CPT | Performed by: SURGERY

## 2022-06-07 PROCEDURE — 88304 TISSUE EXAM BY PATHOLOGIST: CPT

## 2022-06-07 PROCEDURE — 2709999900 HC NON-CHARGEABLE SUPPLY: Performed by: SURGERY

## 2022-06-07 PROCEDURE — 81001 URINALYSIS AUTO W/SCOPE: CPT

## 2022-06-07 PROCEDURE — 2500000003 HC RX 250 WO HCPCS: Performed by: ANESTHESIOLOGY

## 2022-06-07 PROCEDURE — 6360000002 HC RX W HCPCS: Performed by: EMERGENCY MEDICINE

## 2022-06-07 PROCEDURE — 7100000001 HC PACU RECOVERY - ADDTL 15 MIN: Performed by: SURGERY

## 2022-06-07 PROCEDURE — 6360000002 HC RX W HCPCS: Performed by: SPECIALIST

## 2022-06-07 PROCEDURE — 2580000003 HC RX 258: Performed by: SURGERY

## 2022-06-07 PROCEDURE — 85027 COMPLETE CBC AUTOMATED: CPT

## 2022-06-07 PROCEDURE — 6360000002 HC RX W HCPCS

## 2022-06-07 PROCEDURE — 7100000000 HC PACU RECOVERY - FIRST 15 MIN: Performed by: SURGERY

## 2022-06-07 PROCEDURE — G0378 HOSPITAL OBSERVATION PER HR: HCPCS

## 2022-06-07 PROCEDURE — 2500000003 HC RX 250 WO HCPCS: Performed by: SPECIALIST

## 2022-06-07 PROCEDURE — 87086 URINE CULTURE/COLONY COUNT: CPT

## 2022-06-07 PROCEDURE — 3600000009 HC SURGERY ROBOT BASE: Performed by: SURGERY

## 2022-06-07 PROCEDURE — 6370000000 HC RX 637 (ALT 250 FOR IP): Performed by: STUDENT IN AN ORGANIZED HEALTH CARE EDUCATION/TRAINING PROGRAM

## 2022-06-07 PROCEDURE — 0FT44ZZ RESECTION OF GALLBLADDER, PERCUTANEOUS ENDOSCOPIC APPROACH: ICD-10-PCS | Performed by: SURGERY

## 2022-06-07 PROCEDURE — S2900 ROBOTIC SURGICAL SYSTEM: HCPCS | Performed by: SURGERY

## 2022-06-07 PROCEDURE — 80053 COMPREHEN METABOLIC PANEL: CPT

## 2022-06-07 PROCEDURE — 87186 SC STD MICRODIL/AGAR DIL: CPT

## 2022-06-07 PROCEDURE — 2580000003 HC RX 258: Performed by: ANESTHESIOLOGY

## 2022-06-07 PROCEDURE — 1200000000 HC SEMI PRIVATE

## 2022-06-07 PROCEDURE — 3700000001 HC ADD 15 MINUTES (ANESTHESIA): Performed by: SURGERY

## 2022-06-07 RX ORDER — SODIUM CHLORIDE 0.9 % (FLUSH) 0.9 %
5-40 SYRINGE (ML) INJECTION PRN
Status: DISCONTINUED | OUTPATIENT
Start: 2022-06-07 | End: 2022-06-07 | Stop reason: HOSPADM

## 2022-06-07 RX ORDER — ROCURONIUM BROMIDE 10 MG/ML
INJECTION, SOLUTION INTRAVENOUS PRN
Status: DISCONTINUED | OUTPATIENT
Start: 2022-06-07 | End: 2022-06-07 | Stop reason: SDUPTHER

## 2022-06-07 RX ORDER — BUPIVACAINE HYDROCHLORIDE 5 MG/ML
40 INJECTION, SOLUTION EPIDURAL; INTRACAUDAL ONCE
Status: COMPLETED | OUTPATIENT
Start: 2022-06-07 | End: 2022-06-07

## 2022-06-07 RX ORDER — ONDANSETRON 2 MG/ML
INJECTION INTRAMUSCULAR; INTRAVENOUS PRN
Status: DISCONTINUED | OUTPATIENT
Start: 2022-06-07 | End: 2022-06-07 | Stop reason: SDUPTHER

## 2022-06-07 RX ORDER — FENTANYL CITRATE 50 UG/ML
50 INJECTION, SOLUTION INTRAMUSCULAR; INTRAVENOUS ONCE
Status: COMPLETED | OUTPATIENT
Start: 2022-06-07 | End: 2022-06-07

## 2022-06-07 RX ORDER — ONDANSETRON 2 MG/ML
4 INJECTION INTRAMUSCULAR; INTRAVENOUS ONCE
Status: COMPLETED | OUTPATIENT
Start: 2022-06-07 | End: 2022-06-07

## 2022-06-07 RX ORDER — SODIUM CHLORIDE 0.9 % (FLUSH) 0.9 %
5-40 SYRINGE (ML) INJECTION EVERY 12 HOURS SCHEDULED
Status: DISCONTINUED | OUTPATIENT
Start: 2022-06-07 | End: 2022-06-07 | Stop reason: HOSPADM

## 2022-06-07 RX ORDER — MIDAZOLAM HYDROCHLORIDE 1 MG/ML
INJECTION INTRAMUSCULAR; INTRAVENOUS
Status: DISPENSED
Start: 2022-06-07 | End: 2022-06-07

## 2022-06-07 RX ORDER — LIDOCAINE HYDROCHLORIDE 10 MG/ML
INJECTION, SOLUTION EPIDURAL; INFILTRATION; INTRACAUDAL; PERINEURAL PRN
Status: DISCONTINUED | OUTPATIENT
Start: 2022-06-07 | End: 2022-06-07 | Stop reason: SDUPTHER

## 2022-06-07 RX ORDER — FENTANYL CITRATE 50 UG/ML
25 INJECTION, SOLUTION INTRAMUSCULAR; INTRAVENOUS EVERY 5 MIN PRN
Status: COMPLETED | OUTPATIENT
Start: 2022-06-07 | End: 2022-06-07

## 2022-06-07 RX ORDER — NEOSTIGMINE METHYLSULFATE 1 MG/ML
INJECTION, SOLUTION INTRAVENOUS PRN
Status: DISCONTINUED | OUTPATIENT
Start: 2022-06-07 | End: 2022-06-07 | Stop reason: SDUPTHER

## 2022-06-07 RX ORDER — INDOCYANINE GREEN AND WATER 25 MG
5 KIT INJECTION
Status: COMPLETED | OUTPATIENT
Start: 2022-06-07 | End: 2022-06-07

## 2022-06-07 RX ORDER — PROMETHAZINE HYDROCHLORIDE 25 MG/ML
12.5 INJECTION, SOLUTION INTRAMUSCULAR; INTRAVENOUS EVERY 6 HOURS PRN
Status: DISCONTINUED | OUTPATIENT
Start: 2022-06-07 | End: 2022-06-08 | Stop reason: HOSPADM

## 2022-06-07 RX ORDER — FENTANYL CITRATE 50 UG/ML
INJECTION, SOLUTION INTRAMUSCULAR; INTRAVENOUS PRN
Status: DISCONTINUED | OUTPATIENT
Start: 2022-06-07 | End: 2022-06-07 | Stop reason: SDUPTHER

## 2022-06-07 RX ORDER — CEFAZOLIN SODIUM 2 G/50ML
SOLUTION INTRAVENOUS PRN
Status: DISCONTINUED | OUTPATIENT
Start: 2022-06-07 | End: 2022-06-07 | Stop reason: SDUPTHER

## 2022-06-07 RX ORDER — SODIUM CHLORIDE, SODIUM LACTATE, POTASSIUM CHLORIDE, CALCIUM CHLORIDE 600; 310; 30; 20 MG/100ML; MG/100ML; MG/100ML; MG/100ML
INJECTION, SOLUTION INTRAVENOUS CONTINUOUS
Status: DISCONTINUED | OUTPATIENT
Start: 2022-06-07 | End: 2022-06-07 | Stop reason: HOSPADM

## 2022-06-07 RX ORDER — BUPIVACAINE HYDROCHLORIDE 5 MG/ML
INJECTION, SOLUTION PERINEURAL PRN
Status: DISCONTINUED | OUTPATIENT
Start: 2022-06-07 | End: 2022-06-07 | Stop reason: HOSPADM

## 2022-06-07 RX ORDER — MAGNESIUM HYDROXIDE 1200 MG/15ML
LIQUID ORAL CONTINUOUS PRN
Status: DISCONTINUED | OUTPATIENT
Start: 2022-06-07 | End: 2022-06-07 | Stop reason: HOSPADM

## 2022-06-07 RX ORDER — LIDOCAINE HYDROCHLORIDE 10 MG/ML
1 INJECTION, SOLUTION EPIDURAL; INFILTRATION; INTRACAUDAL; PERINEURAL
Status: DISCONTINUED | OUTPATIENT
Start: 2022-06-07 | End: 2022-06-07 | Stop reason: HOSPADM

## 2022-06-07 RX ORDER — FENTANYL CITRATE 50 UG/ML
50 INJECTION, SOLUTION INTRAMUSCULAR; INTRAVENOUS EVERY 5 MIN PRN
Status: COMPLETED | OUTPATIENT
Start: 2022-06-07 | End: 2022-06-07

## 2022-06-07 RX ORDER — MEPERIDINE HYDROCHLORIDE 50 MG/ML
12.5 INJECTION INTRAMUSCULAR; INTRAVENOUS; SUBCUTANEOUS EVERY 5 MIN PRN
Status: DISCONTINUED | OUTPATIENT
Start: 2022-06-07 | End: 2022-06-07 | Stop reason: HOSPADM

## 2022-06-07 RX ORDER — ONDANSETRON 2 MG/ML
4 INJECTION INTRAMUSCULAR; INTRAVENOUS
Status: COMPLETED | OUTPATIENT
Start: 2022-06-07 | End: 2022-06-07

## 2022-06-07 RX ORDER — SODIUM CHLORIDE 9 MG/ML
INJECTION, SOLUTION INTRAVENOUS PRN
Status: DISCONTINUED | OUTPATIENT
Start: 2022-06-07 | End: 2022-06-07 | Stop reason: HOSPADM

## 2022-06-07 RX ORDER — PROPOFOL 10 MG/ML
INJECTION, EMULSION INTRAVENOUS PRN
Status: DISCONTINUED | OUTPATIENT
Start: 2022-06-07 | End: 2022-06-07 | Stop reason: SDUPTHER

## 2022-06-07 RX ORDER — MIDAZOLAM HYDROCHLORIDE 2 MG/2ML
1 INJECTION, SOLUTION INTRAMUSCULAR; INTRAVENOUS ONCE
Status: COMPLETED | OUTPATIENT
Start: 2022-06-07 | End: 2022-06-07

## 2022-06-07 RX ORDER — DEXAMETHASONE SODIUM PHOSPHATE 10 MG/ML
INJECTION INTRAMUSCULAR; INTRAVENOUS PRN
Status: DISCONTINUED | OUTPATIENT
Start: 2022-06-07 | End: 2022-06-07 | Stop reason: SDUPTHER

## 2022-06-07 RX ORDER — NITROFURANTOIN 25; 75 MG/1; MG/1
100 CAPSULE ORAL EVERY 12 HOURS SCHEDULED
Status: DISCONTINUED | OUTPATIENT
Start: 2022-06-07 | End: 2022-06-08 | Stop reason: HOSPADM

## 2022-06-07 RX ORDER — GLYCOPYRROLATE 0.2 MG/ML
INJECTION INTRAMUSCULAR; INTRAVENOUS PRN
Status: DISCONTINUED | OUTPATIENT
Start: 2022-06-07 | End: 2022-06-07 | Stop reason: SDUPTHER

## 2022-06-07 RX ORDER — MORPHINE SULFATE 4 MG/ML
4 INJECTION, SOLUTION INTRAMUSCULAR; INTRAVENOUS EVERY 4 HOURS PRN
Status: DISCONTINUED | OUTPATIENT
Start: 2022-06-07 | End: 2022-06-08 | Stop reason: HOSPADM

## 2022-06-07 RX ORDER — CIPROFLOXACIN 500 MG/1
500 TABLET, FILM COATED ORAL EVERY 12 HOURS SCHEDULED
Status: DISCONTINUED | OUTPATIENT
Start: 2022-06-07 | End: 2022-06-07 | Stop reason: ALTCHOICE

## 2022-06-07 RX ORDER — KETOROLAC TROMETHAMINE 30 MG/ML
INJECTION, SOLUTION INTRAMUSCULAR; INTRAVENOUS PRN
Status: DISCONTINUED | OUTPATIENT
Start: 2022-06-07 | End: 2022-06-07 | Stop reason: SDUPTHER

## 2022-06-07 RX ORDER — BUPIVACAINE HYDROCHLORIDE 5 MG/ML
INJECTION, SOLUTION EPIDURAL; INTRACAUDAL
Status: COMPLETED | OUTPATIENT
Start: 2022-06-07 | End: 2022-06-07

## 2022-06-07 RX ORDER — KETOROLAC TROMETHAMINE 15 MG/ML
15 INJECTION, SOLUTION INTRAMUSCULAR; INTRAVENOUS EVERY 6 HOURS PRN
Status: DISCONTINUED | OUTPATIENT
Start: 2022-06-07 | End: 2022-06-08 | Stop reason: HOSPADM

## 2022-06-07 RX ORDER — FENTANYL CITRATE 50 UG/ML
INJECTION, SOLUTION INTRAMUSCULAR; INTRAVENOUS
Status: COMPLETED
Start: 2022-06-07 | End: 2022-06-07

## 2022-06-07 RX ORDER — MIDAZOLAM HYDROCHLORIDE 2 MG/2ML
1 INJECTION, SOLUTION INTRAMUSCULAR; INTRAVENOUS EVERY 10 MIN PRN
Status: DISCONTINUED | OUTPATIENT
Start: 2022-06-07 | End: 2022-06-07 | Stop reason: HOSPADM

## 2022-06-07 RX ADMIN — DEXAMETHASONE SODIUM PHOSPHATE 10 MG: 10 INJECTION INTRAMUSCULAR; INTRAVENOUS at 08:07

## 2022-06-07 RX ADMIN — FENTANYL CITRATE 50 MCG: 50 INJECTION, SOLUTION INTRAMUSCULAR; INTRAVENOUS at 09:42

## 2022-06-07 RX ADMIN — FENTANYL CITRATE 50 MCG: 50 INJECTION, SOLUTION INTRAMUSCULAR; INTRAVENOUS at 10:30

## 2022-06-07 RX ADMIN — FENTANYL CITRATE 50 MCG: 50 INJECTION, SOLUTION INTRAMUSCULAR; INTRAVENOUS at 07:58

## 2022-06-07 RX ADMIN — GLYCOPYRROLATE 0.4 MG: 0.2 INJECTION INTRAMUSCULAR; INTRAVENOUS at 09:58

## 2022-06-07 RX ADMIN — ONDANSETRON 4 MG: 2 INJECTION INTRAMUSCULAR; INTRAVENOUS at 07:15

## 2022-06-07 RX ADMIN — ROCURONIUM BROMIDE 40 MG: 10 INJECTION INTRAVENOUS at 07:58

## 2022-06-07 RX ADMIN — FENTANYL CITRATE 50 MCG: 50 INJECTION, SOLUTION INTRAMUSCULAR; INTRAVENOUS at 07:25

## 2022-06-07 RX ADMIN — FENTANYL CITRATE 50 MCG: 50 INJECTION, SOLUTION INTRAMUSCULAR; INTRAVENOUS at 10:53

## 2022-06-07 RX ADMIN — SODIUM CHLORIDE, POTASSIUM CHLORIDE, SODIUM LACTATE AND CALCIUM CHLORIDE: 600; 310; 30; 20 INJECTION, SOLUTION INTRAVENOUS at 06:50

## 2022-06-07 RX ADMIN — FENTANYL CITRATE 25 MCG: 50 INJECTION, SOLUTION INTRAMUSCULAR; INTRAVENOUS at 11:05

## 2022-06-07 RX ADMIN — PROPOFOL 150 MG: 10 INJECTION, EMULSION INTRAVENOUS at 07:58

## 2022-06-07 RX ADMIN — FENTANYL CITRATE 50 MCG: 50 INJECTION, SOLUTION INTRAMUSCULAR; INTRAVENOUS at 08:19

## 2022-06-07 RX ADMIN — ROCURONIUM BROMIDE 10 MG: 10 INJECTION INTRAVENOUS at 08:19

## 2022-06-07 RX ADMIN — PROMETHAZINE HYDROCHLORIDE 12.5 MG: 25 INJECTION INTRAMUSCULAR; INTRAVENOUS at 13:39

## 2022-06-07 RX ADMIN — MEPERIDINE HYDROCHLORIDE 12.5 MG: 50 INJECTION, SOLUTION INTRAMUSCULAR; INTRAVENOUS; SUBCUTANEOUS at 10:59

## 2022-06-07 RX ADMIN — MORPHINE SULFATE 4 MG: 4 INJECTION INTRAVENOUS at 20:59

## 2022-06-07 RX ADMIN — HYDROMORPHONE HYDROCHLORIDE 0.5 MG: 1 INJECTION, SOLUTION INTRAMUSCULAR; INTRAVENOUS; SUBCUTANEOUS at 11:35

## 2022-06-07 RX ADMIN — NITROFURANTOIN MONOHYDRATE/MACROCRYSTALLINE 100 MG: 25; 75 CAPSULE ORAL at 20:59

## 2022-06-07 RX ADMIN — MIDAZOLAM HYDROCHLORIDE 2 MG: 1 INJECTION, SOLUTION INTRAMUSCULAR; INTRAVENOUS at 07:25

## 2022-06-07 RX ADMIN — ONDANSETRON 4 MG: 2 INJECTION INTRAMUSCULAR; INTRAVENOUS at 10:52

## 2022-06-07 RX ADMIN — ONDANSETRON 4 MG: 2 INJECTION INTRAMUSCULAR; INTRAVENOUS at 09:44

## 2022-06-07 RX ADMIN — PROMETHAZINE HYDROCHLORIDE 12.5 MG: 25 INJECTION INTRAMUSCULAR; INTRAVENOUS at 22:17

## 2022-06-07 RX ADMIN — FENTANYL CITRATE 25 MCG: 50 INJECTION, SOLUTION INTRAMUSCULAR; INTRAVENOUS at 11:00

## 2022-06-07 RX ADMIN — BUPIVACAINE HYDROCHLORIDE 40 ML: 5 INJECTION, SOLUTION EPIDURAL; INTRACAUDAL; PERINEURAL at 07:25

## 2022-06-07 RX ADMIN — Medication 200 MG: at 07:34

## 2022-06-07 RX ADMIN — FENTANYL CITRATE 50 MCG: 50 INJECTION, SOLUTION INTRAMUSCULAR; INTRAVENOUS at 10:35

## 2022-06-07 RX ADMIN — NEOSTIGMINE METHYLSULFATE 3 MG: 1 INJECTION, SOLUTION INTRAVENOUS at 09:58

## 2022-06-07 RX ADMIN — SODIUM CHLORIDE, PRESERVATIVE FREE 10 ML: 5 INJECTION INTRAVENOUS at 21:03

## 2022-06-07 RX ADMIN — MORPHINE SULFATE 4 MG: 4 INJECTION INTRAVENOUS at 12:46

## 2022-06-07 RX ADMIN — MORPHINE SULFATE 4 MG: 4 INJECTION INTRAVENOUS at 16:48

## 2022-06-07 RX ADMIN — PROMETHAZINE HYDROCHLORIDE 12.5 MG: 25 INJECTION INTRAMUSCULAR; INTRAVENOUS at 02:07

## 2022-06-07 RX ADMIN — LIDOCAINE HYDROCHLORIDE 50 MG: 10 INJECTION, SOLUTION EPIDURAL; INFILTRATION; INTRACAUDAL; PERINEURAL at 07:58

## 2022-06-07 RX ADMIN — HYDROMORPHONE HYDROCHLORIDE 0.5 MG: 1 INJECTION, SOLUTION INTRAMUSCULAR; INTRAVENOUS; SUBCUTANEOUS at 03:43

## 2022-06-07 RX ADMIN — CEFAZOLIN SODIUM 2000 MG: 2 SOLUTION INTRAVENOUS at 08:06

## 2022-06-07 RX ADMIN — INDOCYANINE GREEN AND WATER 5 MG: KIT at 08:13

## 2022-06-07 RX ADMIN — KETOROLAC TROMETHAMINE 30 MG: 30 INJECTION, SOLUTION INTRAMUSCULAR; INTRAVENOUS at 09:51

## 2022-06-07 ASSESSMENT — PAIN SCALES - GENERAL
PAINLEVEL_OUTOF10: 6
PAINLEVEL_OUTOF10: 7
PAINLEVEL_OUTOF10: 10
PAINLEVEL_OUTOF10: 7
PAINLEVEL_OUTOF10: 10
PAINLEVEL_OUTOF10: 8
PAINLEVEL_OUTOF10: 10
PAINLEVEL_OUTOF10: 7
PAINLEVEL_OUTOF10: 7
PAINLEVEL_OUTOF10: 10

## 2022-06-07 ASSESSMENT — PAIN DESCRIPTION - LOCATION
LOCATION: ABDOMEN

## 2022-06-07 ASSESSMENT — PAIN DESCRIPTION - PAIN TYPE: TYPE: SURGICAL PAIN

## 2022-06-07 ASSESSMENT — PAIN - FUNCTIONAL ASSESSMENT: PAIN_FUNCTIONAL_ASSESSMENT: 0-10

## 2022-06-07 ASSESSMENT — PAIN DESCRIPTION - DESCRIPTORS
DESCRIPTORS: ACHING
DESCRIPTORS: SHARP
DESCRIPTORS: CRUSHING;BURNING

## 2022-06-07 ASSESSMENT — PAIN DESCRIPTION - ORIENTATION
ORIENTATION: LEFT
ORIENTATION: MID
ORIENTATION: LEFT
ORIENTATION: RIGHT

## 2022-06-07 NOTE — PROGRESS NOTES
Bariatric Surgery:  Daily Progress Note              PATIENT NAME: Mandy Maidens Schober     TODAY'S DATE: 6/7/2022, 8:07 AM  CC:  Mild nausea    SUBJECTIVE:     Pt seen and examined at bedside. No acute events overnight. Pain hs mostly resolved while laying down, returns with movement. Continues to have some nausea but no Emesis. Patient is NPO     OBJECTIVE:   VITALS:  BP (!) 95/53   Pulse (!) 46   Temp 97.7 °F (36.5 °C) (Temporal)   Resp 10   Ht 5' 4.5\" (1.638 m)   Wt 182 lb (82.6 kg)   SpO2 99%   BMI 30.76 kg/m²      INTAKE/OUTPUT:    No intake or output data in the 24 hours ending 06/07/22 0807    PHYSICAL EXAM:  General Appearance: awake, alert, oriented, in no acute distress  HEENT:  Normocephalic, atraumatic, mucus membranes moist   Heart: Heart regular rate and rhythm  Lungs: Normal expansion. Clear to auscultation. No rales, rhonchi, or wheezing. Abdomen:sft, tender to palpation epigastric, non distended   Extremities: No cyanosis, pitting edema, rashes noted. Skin: Skin color, texture, turgor normal. No rashes or lesions.     Data:  CBC with Differential:    Lab Results   Component Value Date    WBC 6.2 06/07/2022    RBC 3.97 06/07/2022    HGB 11.9 06/07/2022    HCT 35.7 06/07/2022     06/07/2022    MCV 89.9 06/07/2022    MCH 30.0 06/07/2022    MCHC 33.3 06/07/2022    RDW 11.8 06/07/2022    LYMPHOPCT 28 06/06/2022    LYMPHOPCT 37.0 05/27/2020    MONOPCT 5 06/06/2022    MONOPCT 5.0 05/27/2020    EOSPCT 1.2 05/27/2020    BASOPCT 0 06/06/2022    BASOPCT 0.2 05/27/2020    MONOSABS 0.41 06/06/2022    MONOSABS 0.29 05/27/2020    LYMPHSABS 2.11 06/06/2022    LYMPHSABS 2.15 05/27/2020    EOSABS 0.04 06/06/2022    EOSABS 0.7 05/27/2020    BASOSABS <0.03 06/06/2022    DIFFTYPE NOT REPORTED 08/26/2019     CMP:    Lab Results   Component Value Date     06/07/2022    K 4.1 06/07/2022     06/07/2022    CO2 23 06/07/2022    BUN 11 06/07/2022    CREATININE 0.49 06/07/2022    GFRAA >60 06/07/2022    LABGLOM >60 06/07/2022    GLUCOSE 91 06/07/2022    PROT 6.1 06/07/2022    LABALBU 3.7 06/07/2022    CALCIUM 9.2 06/07/2022    BILITOT 0.95 06/07/2022    ALKPHOS 67 06/07/2022    AST 16 06/07/2022    ALT 19 06/07/2022       Radiology Review:    CT ABDOMEN PELVIS W IV CONTRAST Additional Contrast? Oral    Result Date: 6/6/2022  Heaven-en-Y gastric bypass without surrounding inflammation or associated bowel obstruction. Mild distal esophageal wall thickening. Correlate for esophagitis. Cholelithiasis without cholecystitis. Mild nonspecific graying of the fat along the mesenteric root. Fluid-filled structure in the left adnexa with features favoring hydrosalpinx, increased in caliber from 2019 comparison. This could be further assessed on a routine basis with pelvic ultrasound. ASSESSMENT:  Active Hospital Problems    Diagnosis Date Noted    Abdominal pain [R10.9] 06/06/2022     Priority: Medium       39 y.o. female with left and epigastric abdominal pain    Plan:  1. Con pain control  2. NPO for procedure  3.  IVF while NPO  4. Plan for robotic assisted laparoscopy, cholecystectomy, EGD    Electronically signed by Sydnie Ruiz DO  on 6/7/2022 at 8:07 AM

## 2022-06-07 NOTE — ANESTHESIA PRE PROCEDURE
Department of Anesthesiology  Preprocedure Note       Name:  Le Quijano   Age:  39 y.o.  :  1980                                          MRN:  [id number]         Date:  2022      Surgeon: Spenser Singh):  Neri Mendoza DO    Procedure: Procedure(s):  **ADD ON WANTS TO BUMP SELF**CHOLECYSTECTOMY LAPAROSCOPIC ROBOTIC    Medications prior to admission:   Prior to Admission medications    Medication Sig Start Date End Date Taking? Authorizing Provider   Calcium Carbonate Antacid (TUMS PO) Take by mouth    Historical Provider, MD   Multiple Vitamins-Minerals (THERAPEUTIC MULTIVITAMIN-MINERALS) tablet Take by mouth    Historical Provider, MD   vitamin D (ERGOCALCIFEROL) 1.25 MG (84910 UT) CAPS capsule Take 1 capsule by mouth once a week for 8 doses 20  Leeann Flow, APRN - JESSICA   amphetamine-dextroamphetamine (ADDERALL XR) 20 MG extended release capsule  20   Historical Provider, MD   clonazePAM (KLONOPIN) 0.5 MG tablet Take 0.5 mg by mouth 2 times daily as needed.   Patient not taking: Reported on 2022    Historical Provider, MD       Current medications:    Current Facility-Administered Medications   Medication Dose Route Frequency Provider Last Rate Last Admin    promethazine (PHENERGAN) injection 12.5 mg  12.5 mg IntraMUSCular Q6H PRN Antonia Goodman, DO   12.5 mg at 22 0207    iohexol (OMNIPAQUE 240) injection 50 mL  50 mL Oral ONCE PRN Neena Morrell MD        sodium chloride flush 0.9 % injection 5-40 mL  5-40 mL IntraVENous 2 times per day Steinberg Power, DO   10 mL at 22 2323    sodium chloride flush 0.9 % injection 5-40 mL  5-40 mL IntraVENous PRN Steinberg Power, DO        0.9 % sodium chloride infusion   IntraVENous PRN Steinberg Power, DO        enoxaparin (LOVENOX) injection 40 mg  40 mg SubCUTAneous 2 times per day Steinberg Power, DO   40 mg at 22 2331    lactated ringers infusion   IntraVENous Continuous Steinberg Power,  mL/hr at 06/06/22 2322 New Bag at 06/06/22 2322    acetaminophen (TYLENOL) tablet 1,000 mg  1,000 mg Oral Q8H PRN Eduin Irons, DO        oxyCODONE-acetaminophen (PERCOCET) 5-325 MG per tablet 1 tablet  1 tablet Oral Q4H PRN Eduin Irons, DO        HYDROmorphone (DILAUDID) injection 0.5 mg  0.5 mg IntraVENous Q4H PRN Eduin Irons, DO   0.5 mg at 06/07/22 0343       Allergies:  No Known Allergies    Problem List:    Patient Active Problem List   Diagnosis Code    History of depression Z86.59    CURLY (obstructive sleep apnea) G47.33    Vitamin D deficiency E55.9    Status post gastric bypass for obesity Z98.84    Obesity (BMI 30-39. 9) E66.9    Abdominal pain R10.9       Past Medical History:        Diagnosis Date    Back pain     Depression     Migraines     Sleep apnea        Past Surgical History:        Procedure Laterality Date    HYSTERECTOMY      PARTIAL HYSTERECTOMY      BRIAN-EN-Y GASTRIC BYPASS  04/16/2019    robotic, laparoscopic, liver biopsy, EGD    BRIAN-EN-Y GASTRIC BYPASS N/A 4/16/2019    XI ROBOTIC LAPAROSCOPIC GASTRIC BYPASS BRIAN-EN-Y, LIVER BIOPSY, EGD - GI SCHEDULED performed by Ibis Birmingham DO at [address] [place] [address] N/A 11/23/2018    EGD BIOPSY performed by Ibis Birmingham DO at [facility]       Social History:    Social History     Tobacco Use    Smoking status: Never Smoker    Smokeless tobacco: Never Used   Substance Use Topics    Alcohol use:  Yes                                Counseling given: Not Answered      Vital Signs (Current):   Vitals:    06/06/22 1618 06/06/22 1746 06/06/22 2050 06/06/22 2311   BP:  108/77 103/64 102/63   Pulse:  71 62 74   Resp:  22 17 16   Temp:    98 °F (36.7 °C)   TempSrc:    Oral   SpO2:       Weight: 182 lb (82.6 kg)      Height: 5' 4.5\" (1.638 m)                                                 BP Readings from Last 3 Encounters:   06/06/22 102/63   10/08/20 102/60   06/05/20 110/72       NPO Status: Time of last liquid consumption: 2200                        Time of last solid consumption: 2100                                                      BMI:   Wt Readings from Last 3 Encounters:   06/06/22 182 lb (82.6 kg)   10/08/20 194 lb (88 kg)   06/05/20 212 lb (96.2 kg)     Body mass index is 30.76 kg/m². CBC:   Lab Results   Component Value Date    WBC 6.2 06/07/2022    RBC 3.97 06/07/2022    HGB 11.9 06/07/2022    HCT 35.7 06/07/2022    MCV 89.9 06/07/2022    RDW 11.8 06/07/2022     06/07/2022       CMP:   Lab Results   Component Value Date     06/07/2022    K 4.1 06/07/2022     06/07/2022    CO2 23 06/07/2022    BUN 11 06/07/2022    CREATININE 0.49 06/07/2022    GFRAA >60 06/07/2022    LABGLOM >60 06/07/2022    GLUCOSE 91 06/07/2022    PROT 6.1 06/07/2022    CALCIUM 9.2 06/07/2022    BILITOT 0.95 06/07/2022    ALKPHOS 67 06/07/2022    AST 16 06/07/2022    ALT 19 06/07/2022       POC Tests: No results for input(s): POCGLU, POCNA, POCK, POCCL, POCBUN, POCHEMO, POCHCT in the last 72 hours.     Coags:   Lab Results   Component Value Date    PROTIME 9.5 04/02/2019    INR 0.9 04/02/2019    APTT 23.9 04/02/2019       HCG (If Applicable): No results found for: PREGTESTUR, PREGSERUM, HCG, HCGQUANT     ABGs: No results found for: PHART, PO2ART, ANW9IFJ, ZOZ1ZQD, BEART, T1ZFZMHV     Type & Screen (If Applicable):  No results found for: LABABO, LABRH    Drug/Infectious Status (If Applicable):  No results found for: HIV, HEPCAB    COVID-19 Screening (If Applicable): No results found for: COVID19        Anesthesia Evaluation  Patient summary reviewed no history of anesthetic complications:   Airway: Mallampati: II  TM distance: >3 FB   Neck ROM: full  Mouth opening: > = 3 FB   Dental:          Pulmonary:normal exam        (-) sleep apnea                           Cardiovascular:Negative CV ROS            Rhythm: regular  Rate: normal                    Neuro/Psych:   (+) headaches: migraine headaches, psychiatric history:depression/anxiety             GI/Hepatic/Renal: Neg GI/Hepatic/Renal ROS            Endo/Other: Negative Endo/Other ROS                    Abdominal:             Vascular: negative vascular ROS. Other Findings:           Anesthesia Plan      general     ASA 2       Induction: intravenous. Anesthetic plan and risks discussed with patient. Plan discussed with CRNA.                     Berta Bender MD   6/7/2022

## 2022-06-07 NOTE — ANESTHESIA PROCEDURE NOTES
Peripheral Block    Patient location during procedure: pre-op  Start time: 6/7/2022 7:25 AM  End time: 6/7/2022 7:29 AM  Staffing  Performed: anesthesiologist   Anesthesiologist: Wayne Archer MD  Preanesthetic Checklist  Completed: patient identified, IV checked, site marked, risks and benefits discussed, surgical/procedural consents, equipment checked, pre-op evaluation, timeout performed, anesthesia consent given, oxygen available and monitors applied/VS acknowledged  Peripheral Block  Patient position: supine  Prep: ChloraPrep  Patient monitoring: cardiac monitor, continuous pulse ox, frequent blood pressure checks and IV access  Block type: TAP and Rectus sheath  Laterality: bilateral  Injection technique: single-shot  Guidance: ultrasound guided  Local infiltration: lidocaine  Infiltration strength: 1 %  Dose: 3 mL  Provider prep: mask and sterile gloves  Local infiltration: lidocaine  Needle  Needle type: short-bevel   Needle gauge: 21 G  Needle length: 10 cm  Needle localization: ultrasound guidance  Needle insertion depth: 2.5 cm  Catheter type: open end  Test dose: negative  Assessment  Injection assessment: negative aspiration for heme, no paresthesia on injection and local visualized surrounding nerve on ultrasound  Paresthesia pain: none  Slow fractionated injection: yes  Hemodynamics: stablepermanent images obtainedOutcomes: uncomplicated and patient tolerated procedure well  Additional Notes  U/S [id number].  (1) Under ultrasound guidance, a 21 gauge needle was inserted and placed in close proximity to the abdominal nerve.  (2) Ultrasound was also used to visualize the spread of the anesthetic in close proximity to the nerve being blocked. (3) The nerve appeared anatomically normal, and (4 there were no apparent abnormal pathological findings on the image that were readily visible and related to the nerve being blocked.  (5) A permanent ultrasound image was saved in the patient's record.         Medications Administered  Bupivacaine (MARCAINE) PF injection 0.5%, 40 mL  Reason for block: post-op pain management and at surgeon's request

## 2022-06-07 NOTE — BRIEF OP NOTE
Brief Postoperative Note      Patient: John Gimenez  YOB: 1980  MRN: [id number]    Date of Procedure: 6/7/2022    Pre-Op Diagnosis: Chronic Cholecystitis with calculous, Epigastric pain    Post-Op Diagnosis: Same       Procedure(s):  CHOLECYSTECTOMY LAPAROSCOPIC ROBOTIC, EGD, CLOSURE OF MESENTERIC DEFECT    Surgeon(s):  Altaf Hutchinson DO    Assistant:  First Assistant: Pavel Buchanan    Anesthesia: General    Estimated Blood Loss (mL): Minimal    Complications: None    Specimens:   ID Type Source Tests Collected by Time Destination   A : GALLBLADDER AND CONTENTS Tissue Gallbladder SURGICAL PATHOLOGY Altaf Hutchinson DO 6/7/2022 8051        Implants:  * No implants in log *      Drains: * No LDAs found *    Findings: see note    Electronically signed by Altaf Hutchinson DO on 6/7/2022 at 9:56 AM

## 2022-06-07 NOTE — CONSULTS
General Surgery:  Consult Note        PATIENT NAME: Kaitlin Callejas   YOB: 1980    ADMISSION DATE: 6/6/2022  4:10 PM     Admitting Provider: Ric Murray    Consulted Physician: Kirill Huertas DATE: 6/6/2022    Chief Complaint:  Abd pain   Consult Regarding:  abd pain    HISTORY OF PRESENT ILLNESS:  The patient is a 39 y.o. female  who presents to the ED with abd pain for 1 day. Patient has history of RNY in 2019. Has not had issues since that time. Had small BM yesterday and day before, no BM today but continues to pass gas. She has not taken any bowel regimen or pain medication. Past Medical History:        Diagnosis Date    Back pain     Depression     Migraines     Sleep apnea        Past Surgical History:        Procedure Laterality Date    HYSTERECTOMY      PARTIAL HYSTERECTOMY      BRIAN-EN-Y GASTRIC BYPASS  04/16/2019    robotic, laparoscopic, liver biopsy, EGD    BRIAN-EN-Y GASTRIC BYPASS N/A 4/16/2019    XI ROBOTIC LAPAROSCOPIC GASTRIC BYPASS BRIAN-EN-Y, LIVER BIOPSY, EGD - GI SCHEDULED performed by Paulie Da Silva DO at [facility] 11/23/2018    EGD BIOPSY performed by Paulie Da Silva DO at [facility]       Medications Prior to Admission:   Not in a hospital admission. Allergies:  Patient has no known allergies. Social History:   Social History     Socioeconomic History    Marital status:      Spouse name: Not on file    Number of children: Not on file    Years of education: Not on file    Highest education level: Not on file   Occupational History    Not on file   Tobacco Use    Smoking status: Never Smoker    Smokeless tobacco: Never Used   Vaping Use    Vaping Use: Never used   Substance and Sexual Activity    Alcohol use:  Yes    Drug use: Yes     Types: Marijuana [place])     Comment: last used 6 months ago    Sexual activity: Not on file   Other Topics Concern    Not on file   Social History Narrative    Not on file     Social Determinants of Health     Financial Resource Strain:     Difficulty of Paying Living Expenses: Not on file   Food Insecurity:     Worried About Running Out of Food in the Last Year: Not on file    Frantz of Food in the Last Year: Not on file   Transportation Needs:     Lack of Transportation (Medical): Not on file    Lack of Transportation (Non-Medical): Not on file   Physical Activity:     Days of Exercise per Week: Not on file    Minutes of Exercise per Session: Not on file   Stress:     Feeling of Stress : Not on file   Social Connections:     Frequency of Communication with Friends and Family: Not on file    Frequency of Social Gatherings with Friends and Family: Not on file    Attends Faith Services: Not on file    Active Member of [address] [place] [address] [employer] or Organizations: Not on file    Attends Club or Organization Meetings: Not on file    Marital Status: Not on file   Intimate Partner Violence:     Fear of Current or Ex-Partner: Not on file    Emotionally Abused: Not on file    Physically Abused: Not on file    Sexually Abused: Not on file   Housing Stability:     Unable to Pay for Housing in the Last Year: Not on file    Number of [place] in the Last Year: Not on file    Unstable Housing in the Last Year: Not on file       Family History:       Problem Relation Age of Onset    Arthritis Mother     Depression Mother     Heart Disease Father     Cancer Father        REVIEW OF SYSTEMS:    CONSTITUTIONAL: Denies recent weight loss, fatigue, fevers, chills. HEENT: Denies rhinorrhea, dysphagia, odynphagia. CARDIOVASCULAR: Denies history of MI, recent chest pain. RESPIRATORY: Denies recent history of shortness of breath or history of PE. GASTROINTESTINAL: + abd pain, no n/v  GENITOURINARY: Denies increased frequency or dysuria. HEMATOLOGIC/LYMPHATIC: Denies history of anemia or DVTs.   ENDOCRINE: Denies history of thyroid problems or diabetes. NEURO: Denies history of CVA, TIA. Review of systems negative unless listed above. PHYSICAL EXAM:    VITALS:  /74   Pulse 79   Temp 98.5 °F (36.9 °C) (Oral)   Resp 18   Ht 5' 4.5\" (1.638 m)   Wt 182 lb (82.6 kg)   SpO2 96%   BMI 30.76 kg/m²   INTAKE/OUTPUT:   No intake or output data in the 24 hours ending 06/06/22 2043    CONSTITUTIONAL:  awake, alert, not distressed and mildly obese  HEENT: Normocephalic/atraumatic, without obvious abnormality. NECK:  Supple, symmetrical, trachea midline   CARDIOVASCULAR: Regular rate and rhythm   LUNGS: Equal chest rise bilaterally   ABDOMEN: soft, mildly distended, LUQ abd tenderness, L sided pain with palpation on the right side. MUSCULOSKELETAL: Muscle strength intact in all extremities bilaterally. NEUROLOGIC: CN II- XII intact. Gross motor intact without focal weakness. SKIN: No cyanosis, rashes, or edema noted.    Orientation:   oriented to person, place, and time    CBC:   Lab Results   Component Value Date    WBC 7.7 06/06/2022    RBC 4.27 06/06/2022    HGB 12.9 06/06/2022    HCT 39.3 06/06/2022    MCV 92.0 06/06/2022    MCH 30.2 06/06/2022    MCHC 32.8 06/06/2022    RDW 11.7 06/06/2022     06/06/2022    MPV 10.5 06/06/2022     CMP:    Lab Results   Component Value Date     06/06/2022    K 3.9 06/06/2022     06/06/2022    CO2 23 06/06/2022    BUN 13 06/06/2022    CREATININE 0.47 06/06/2022    GFRAA >60 06/06/2022    LABGLOM >60 06/06/2022    GLUCOSE 94 06/06/2022    PROT 6.9 06/06/2022    LABALBU 4.3 06/06/2022    CALCIUM 9.6 06/06/2022    BILITOT 0.83 06/06/2022    ALKPHOS 76 06/06/2022    AST 21 06/06/2022    ALT 23 06/06/2022       Pertinent Radiology:   CT ABDOMEN PELVIS W IV CONTRAST Additional Contrast? Oral    Result Date: 6/6/2022  EXAMINATION: CT OF THE ABDOMEN AND PELVIS WITH CONTRAST 6/6/2022 2:55 pm TECHNIQUE: CT of the abdomen and pelvis was performed with the administration of intravenous contrast. the left adnexa with features favoring hydrosalpinx, increased in caliber from 2019 comparison. This could be further assessed on a routine basis with pelvic ultrasound. ASSESSMENT:  1. Constipation, abd pain     Plan:  1. CT A/P without evidence of inflammation or obstruction, heavy stool burden  2. Recommend regular bowel regimen as outpatient  3. IV hydration  4. Cholelithiasis on CT. Will admit to MIS service and recommend robotic cholecystectomy tomorrow with diagnostic laparoscopy.        Electronically signed by Zahraa Lemon DO  on 6/6/2022 at 8:43 PM

## 2022-06-07 NOTE — ANESTHESIA POSTPROCEDURE EVALUATION
Department of Anesthesiology  Postprocedure Note    Patient: Marisol Phipps  MRN: [id number]  YOB: 1980  Date of evaluation: 6/7/2022  Time:  12:34 PM     Procedure Summary     Date: 06/07/22 Room / Location: [address]    Anesthesia Start: 4263 Anesthesia Stop: 7517    Procedure: CHOLECYSTECTOMY LAPAROSCOPIC ROBOTIC, EGD, CLOSURE OF MESENTERIC DEFECT (N/A ) Diagnosis:       Epigastric abdominal pain      (Epigastric abdominal pain [R10.13])    Surgeons: Kylee Peacock DO Responsible Provider: Angel Grover MD    Anesthesia Type: general ASA Status: 2          Anesthesia Type: No value filed. Bambi Phase I: Bambi Score: 10    Bambi Phase II:      Last vitals: Reviewed and per EMR flowsheets.        Anesthesia Post Evaluation    Patient location during evaluation: PACU  Patient participation: complete - patient participated  Level of consciousness: awake and alert  Pain score: 3  Airway patency: patent  Nausea & Vomiting: no nausea and no vomiting  Complications: no  Cardiovascular status: hemodynamically stable  Respiratory status: acceptable  Hydration status: stable

## 2022-06-07 NOTE — PLAN OF CARE
Problem: Discharge Planning  Goal: Discharge to home or other facility with appropriate resources  6/7/2022 1215 by Kristel Gooden RN  Outcome: Progressing  6/7/2022 0122 by Ragini Kraft RN  Outcome: Progressing     Problem: Pain  Goal: Verbalizes/displays adequate comfort level or baseline comfort level  6/7/2022 1215 by Kristel Gooden RN  Outcome: Progressing  6/7/2022 0122 by Ragini Kraft RN  Outcome: Progressing     Problem: Skin/Tissue Integrity  Goal: Absence of new skin breakdown  Description: 1. Monitor for areas of redness and/or skin breakdown  2. Assess vascular access sites hourly  3. Every 4-6 hours minimum:  Change oxygen saturation probe site  4. Every 4-6 hours:  If on nasal continuous positive airway pressure, respiratory therapy assess nares and determine need for appliance change or resting period.   Outcome: Progressing

## 2022-06-08 ENCOUNTER — APPOINTMENT (OUTPATIENT)
Dept: ULTRASOUND IMAGING | Age: 42
DRG: 418 | End: 2022-06-08
Payer: COMMERCIAL

## 2022-06-08 VITALS
SYSTOLIC BLOOD PRESSURE: 112 MMHG | HEART RATE: 74 BPM | WEIGHT: 182 LBS | BODY MASS INDEX: 30.32 KG/M2 | HEIGHT: 65 IN | TEMPERATURE: 98.1 F | DIASTOLIC BLOOD PRESSURE: 65 MMHG | OXYGEN SATURATION: 97 % | RESPIRATION RATE: 16 BRPM

## 2022-06-08 PROBLEM — R19.00 PELVIC MASS: Status: ACTIVE | Noted: 2022-06-08

## 2022-06-08 PROBLEM — Z90.710 S/P TOTAL ABDOMINAL HYSTERECTOMY: Status: ACTIVE | Noted: 2022-06-08

## 2022-06-08 LAB
ALBUMIN SERPL-MCNC: 3.9 G/DL (ref 3.5–5.2)
ALBUMIN/GLOBULIN RATIO: 1.4 (ref 1–2.5)
ALP BLD-CCNC: 90 U/L (ref 35–104)
ALT SERPL-CCNC: 51 U/L (ref 5–33)
ANION GAP SERPL CALCULATED.3IONS-SCNC: 11 MMOL/L (ref 9–17)
AST SERPL-CCNC: 51 U/L
BILIRUB SERPL-MCNC: 0.85 MG/DL (ref 0.3–1.2)
BUN BLDV-MCNC: 9 MG/DL (ref 6–20)
CA 125: 19 U/ML
CALCIUM SERPL-MCNC: 9.5 MG/DL (ref 8.6–10.4)
CARCINOEMBRYONIC ANTIGEN: 1 NG/ML
CHLORIDE BLD-SCNC: 102 MMOL/L (ref 98–107)
CO2: 26 MMOL/L (ref 20–31)
CREAT SERPL-MCNC: 0.45 MG/DL (ref 0.5–0.9)
GFR AFRICAN AMERICAN: >60 ML/MIN
GFR NON-AFRICAN AMERICAN: >60 ML/MIN
GFR SERPL CREATININE-BSD FRML MDRD: ABNORMAL ML/MIN/{1.73_M2}
GLUCOSE BLD-MCNC: 120 MG/DL (ref 70–99)
HCT VFR BLD CALC: 37.8 % (ref 36.3–47.1)
HEMOGLOBIN: 12.9 G/DL (ref 11.9–15.1)
MCH RBC QN AUTO: 30.2 PG (ref 25.2–33.5)
MCHC RBC AUTO-ENTMCNC: 34.1 G/DL (ref 28.4–34.8)
MCV RBC AUTO: 88.5 FL (ref 82.6–102.9)
NRBC AUTOMATED: 0 PER 100 WBC
PDW BLD-RTO: 11.8 % (ref 11.8–14.4)
PLATELET # BLD: 222 K/UL (ref 138–453)
PMV BLD AUTO: 10.5 FL (ref 8.1–13.5)
POTASSIUM SERPL-SCNC: 4.1 MMOL/L (ref 3.7–5.3)
RBC # BLD: 4.27 M/UL (ref 3.95–5.11)
SODIUM BLD-SCNC: 139 MMOL/L (ref 135–144)
TOTAL PROTEIN: 6.6 G/DL (ref 6.4–8.3)
WBC # BLD: 8.5 K/UL (ref 3.5–11.3)

## 2022-06-08 PROCEDURE — G0378 HOSPITAL OBSERVATION PER HR: HCPCS

## 2022-06-08 PROCEDURE — 86304 IMMUNOASSAY TUMOR CA 125: CPT

## 2022-06-08 PROCEDURE — 2580000003 HC RX 258: Performed by: STUDENT IN AN ORGANIZED HEALTH CARE EDUCATION/TRAINING PROGRAM

## 2022-06-08 PROCEDURE — 96376 TX/PRO/DX INJ SAME DRUG ADON: CPT

## 2022-06-08 PROCEDURE — 85027 COMPLETE CBC AUTOMATED: CPT

## 2022-06-08 PROCEDURE — 6360000002 HC RX W HCPCS: Performed by: STUDENT IN AN ORGANIZED HEALTH CARE EDUCATION/TRAINING PROGRAM

## 2022-06-08 PROCEDURE — 6370000000 HC RX 637 (ALT 250 FOR IP): Performed by: STUDENT IN AN ORGANIZED HEALTH CARE EDUCATION/TRAINING PROGRAM

## 2022-06-08 PROCEDURE — 80053 COMPREHEN METABOLIC PANEL: CPT

## 2022-06-08 PROCEDURE — 36415 COLL VENOUS BLD VENIPUNCTURE: CPT

## 2022-06-08 PROCEDURE — 96375 TX/PRO/DX INJ NEW DRUG ADDON: CPT

## 2022-06-08 PROCEDURE — 96372 THER/PROPH/DIAG INJ SC/IM: CPT

## 2022-06-08 PROCEDURE — 82378 CARCINOEMBRYONIC ANTIGEN: CPT

## 2022-06-08 PROCEDURE — 76830 TRANSVAGINAL US NON-OB: CPT

## 2022-06-08 RX ORDER — NITROFURANTOIN 25; 75 MG/1; MG/1
100 CAPSULE ORAL EVERY 12 HOURS SCHEDULED
Qty: 8 CAPSULE | Refills: 0 | Status: SHIPPED | OUTPATIENT
Start: 2022-06-08 | End: 2022-06-12

## 2022-06-08 RX ORDER — HYDROCODONE BITARTRATE AND ACETAMINOPHEN 5; 325 MG/1; MG/1
1 TABLET ORAL EVERY 6 HOURS PRN
Status: DISCONTINUED | OUTPATIENT
Start: 2022-06-08 | End: 2022-06-08 | Stop reason: HOSPADM

## 2022-06-08 RX ORDER — LIDOCAINE 50 MG/G
1 PATCH TOPICAL DAILY
Qty: 10 PATCH | Refills: 0 | Status: SHIPPED | OUTPATIENT
Start: 2022-06-08 | End: 2022-06-18

## 2022-06-08 RX ORDER — LIDOCAINE 4 G/G
1 PATCH TOPICAL DAILY
Status: DISCONTINUED | OUTPATIENT
Start: 2022-06-08 | End: 2022-06-08 | Stop reason: HOSPADM

## 2022-06-08 RX ORDER — HYDROCODONE BITARTRATE AND ACETAMINOPHEN 5; 325 MG/1; MG/1
1 TABLET ORAL EVERY 6 HOURS PRN
Qty: 20 TABLET | Refills: 0 | Status: SHIPPED | OUTPATIENT
Start: 2022-06-08 | End: 2022-06-13

## 2022-06-08 RX ORDER — PROMETHAZINE HYDROCHLORIDE 25 MG/1
25 TABLET ORAL EVERY 6 HOURS PRN
Qty: 20 TABLET | Refills: 0 | Status: SHIPPED | OUTPATIENT
Start: 2022-06-08 | End: 2022-06-15

## 2022-06-08 RX ADMIN — MORPHINE SULFATE 4 MG: 4 INJECTION INTRAVENOUS at 05:26

## 2022-06-08 RX ADMIN — MORPHINE SULFATE 4 MG: 4 INJECTION INTRAVENOUS at 01:27

## 2022-06-08 RX ADMIN — NITROFURANTOIN MONOHYDRATE/MACROCRYSTALLINE 100 MG: 25; 75 CAPSULE ORAL at 08:12

## 2022-06-08 RX ADMIN — KETOROLAC TROMETHAMINE 15 MG: 15 INJECTION, SOLUTION INTRAMUSCULAR; INTRAVENOUS at 10:10

## 2022-06-08 RX ADMIN — MORPHINE SULFATE 4 MG: 4 INJECTION INTRAVENOUS at 11:12

## 2022-06-08 RX ADMIN — PROMETHAZINE HYDROCHLORIDE 12.5 MG: 25 INJECTION INTRAMUSCULAR; INTRAVENOUS at 08:12

## 2022-06-08 RX ADMIN — HYDROCODONE BITARTRATE AND ACETAMINOPHEN 1 TABLET: 5; 325 TABLET ORAL at 17:03

## 2022-06-08 RX ADMIN — SODIUM CHLORIDE, PRESERVATIVE FREE 10 ML: 5 INJECTION INTRAVENOUS at 08:10

## 2022-06-08 ASSESSMENT — PAIN SCALES - GENERAL
PAINLEVEL_OUTOF10: 6
PAINLEVEL_OUTOF10: 5
PAINLEVEL_OUTOF10: 7
PAINLEVEL_OUTOF10: 10
PAINLEVEL_OUTOF10: 7
PAINLEVEL_OUTOF10: 6

## 2022-06-08 ASSESSMENT — PAIN DESCRIPTION - DESCRIPTORS: DESCRIPTORS: SHARP

## 2022-06-08 ASSESSMENT — PAIN DESCRIPTION - PAIN TYPE: TYPE: ACUTE PAIN

## 2022-06-08 ASSESSMENT — PAIN DESCRIPTION - ORIENTATION: ORIENTATION: MID

## 2022-06-08 ASSESSMENT — PAIN DESCRIPTION - FREQUENCY: FREQUENCY: CONTINUOUS

## 2022-06-08 ASSESSMENT — PAIN DESCRIPTION - LOCATION: LOCATION: ABDOMEN

## 2022-06-08 NOTE — OP NOTE
Operative Note      Patient: Lou Cardozo  YOB: 1980  MRN: [id number]    Date of Procedure: 6/7/2022    Pre-Op Diagnosis: Epigastric pain, Chronic cholecystitis with calculous, Concern for Internal hernia on CT    Post-Op Diagnosis: Same       Procedure(s):  CHOLECYSTECTOMY LAPAROSCOPIC ROBOTIC, EGD, CLOSURE OF MESENTERIC DEFECT/INTERNAL HERNIA    Surgeon(s):  Alto Carrel, DO    Assistant:   First Assistant: Lynsey Norris    Anesthesia: General    Estimated Blood Loss (mL): Minimal    Complications: None    Specimens:   ID Type Source Tests Collected by Time Destination   A : GALLBLADDER AND CONTENTS Tissue Gallbladder SURGICAL PATHOLOGY Alto Carrel, DO 6/7/2022 7113        Implants:  * No implants in log *      Drains: * No LDAs found *    Findings:   Critical view obtained  Counts reported to me as correct  ICG confirmed anatomy    Detailed Description of Procedure:   ICG USED TO CONFIRM ANATOMY  GALLBLADDER FOSSA HEMOSTATIC     Detailed Description of Procedure:   Operative narrative: The risks and benefits of the procedure were explained in detail to the patient who agreed and consented with the procedure. Patient was taken to the operative suite and administered general anesthetic by the anesthetic team.       Using an optical access trocar and a 12 mm port, the peritoneal cavity was entered under direct visualization from Ferrer's point. Pneumoperitoneum was established at that time without complication. Next, three 8 mm ports were then placed in standard fashion for robotic assisted cholecystectomy. All ports placed under direct laparoscopic visualization. The robot docked. We surveyed our entry site into the abdomen at Ferrer's point. No evidence of underlying injury to the viscera noted. The Gallbladder was then elevated up and over the liver to expose the gallbladder in it's entirety. There was some purulent fluid around the gallbladder. Blunt dissection as well as electrocautery was used to skeletonize the cystic duct. Appropriate lateral retraction was undertaken. Other dissection was then undertaken to expose the cystic duct and cystic artery further. Critical view obtained. Of note there was significant fibrotic chronic inflammation around the cystic duct and gallbladder wall edema. There was edema in the gallbladder wall. Cystic duct was noted to travel up into the gallbladder and down into the common bile duct. The cystic duct and cystic artery were then clipped using robotic hemo-lock clips. The anatomy confirmed with ICG. The cystic duct and cystic artery were each clipped with 3 hemo-lock clips. The cystic duct was then cut using the laparoscopic scissors. There was some sludge in the lumen of the cystic duct. A single-lumen noted on each side. The cystic artery was also cut with the vessel sealer and appropriate hemostasis noted. The gallbladder was then dissected from the liver bed using electrocautery and proper countertraction. The liver bed was noted to be hemostatic. The laparoscopic clips were reinspected and noted be intact on the cystic duct and cystic artery. An Endo Catch bag was advanced into the abdomen and the gallbladder placed within the bag. Gallbladder was then removed from the left upper quadrant port site under direct laparoscopic visualization. Proper hemostasis again noted along the liver bed. The robot undocked. Due to prior gastric bypass the bowel then ran from the gastrojejunostomy to the jejunojejunostomy. The biliopancreatic limb ran back to the ligament of treitz. The common channel ran for the first 50 cm. There was no internal hernia at the [facility] nor at meneses's space. Meneses's space was closed with 3-0 Ethibond sutures to close this internal hernia space. She did have a cyst in the pelvis, this appeared without concerning features. Counts reported to me as correct.   We closed the 12 mm port and 8 mm port with 0-vicryl sutures under direct visualization with a suture passer. All other 8 mm ports removed under direct visualization. The 8 mm ports in the right upper quadrant were then removed under direct visualization and hemostasis noted. Pneumoperitoneum was then released from the left upper quadrant port site. Hemostasis noted. The skin was then closed using interrupted 4-0 Monocryl sutures in subcuticular interrupted fashion. The region was cleaned with sterile normal saline. Skin glue applied.       Electronically signed by Yesy Harding DO on 6/8/2022 at 7:05 AM

## 2022-06-08 NOTE — H&P
General Surgery:  Consult Note         PATIENT NAME: Genella Oka Schober   YOB: 1980     ADMISSION DATE: 6/6/2022  4:10 PM      Admitting Provider: Mikayla Joyner     Consulted Physician: [place] DATE: 6/6/2022     Chief Complaint:  Abd pain   Consult Regarding:  abd pain     HISTORY OF PRESENT ILLNESS:  The patient is a 39 y.o. female  who presents to the ED with abd pain for 1 day. Patient has history of RNY in 2019. Has not had issues since that time. Had small BM yesterday and day before, no BM today but continues to pass gas. She has not taken any bowel regimen or pain medication.      Past Medical History:        Diagnosis Date    Back pain      Depression      Migraines      Sleep apnea           Past Surgical History:              Procedure Laterality Date    HYSTERECTOMY        PARTIAL HYSTERECTOMY        BRIAN-EN-Y GASTRIC BYPASS   04/16/2019     robotic, laparoscopic, liver biopsy, EGD    BRIAN-EN-Y GASTRIC BYPASS N/A 4/16/2019     XI ROBOTIC LAPAROSCOPIC GASTRIC BYPASS BRIAN-EN-Y, LIVER BIOPSY, EGD - GI SCHEDULED performed by Ana Allison DO at [address] N/A 11/23/2018     EGD BIOPSY performed by Ana Allison DO at [facility]         Medications Prior to Admission:   Not in a hospital admission.     Allergies:  Patient has no known allergies.     Social History:   Social History            Socioeconomic History    Marital status:        Spouse name: Not on file    Number of children: Not on file    Years of education: Not on file    Highest education level: Not on file   Occupational History    Not on file   Tobacco Use    Smoking status: Never Smoker    Smokeless tobacco: Never Used   Vaping Use    Vaping Use: Never used   Substance and Sexual Activity    Alcohol use: Yes    Drug use:  Yes       Types: Marijuana [place]       Comment: last used 6 months ago    Sexual activity: Not on file   Other Topics Concern    Not on file   Social History Narrative    Not on file      Social Determinants of Health          Financial Resource Strain:     Difficulty of Paying Living Expenses: Not on file   Food Insecurity:     Worried About [address] in the Last Year: Not on file    Frantz of Food in the Last Year: Not on file   Transportation Needs:     Lack of Transportation (Medical): Not on file    Lack of Transportation (Non-Medical): Not on file   Physical Activity:     Days of Exercise per Week: Not on file    Minutes of Exercise per Session: Not on file   Stress:     Feeling of Stress : Not on file   Social Connections:     Frequency of Communication with Friends and Family: Not on file    Frequency of Social Gatherings with Friends and Family: Not on file    Attends Buddhist Services: Not on file    Active Member of [address] [place] [address] or Organizations: Not on file    Attends Club or Organization Meetings: Not on file    Marital Status: Not on file   Intimate Partner Violence:     Fear of Current or Ex-Partner: Not on file    Emotionally Abused: Not on file    Physically Abused: Not on file    Sexually Abused: Not on file   Housing Stability:     Unable to Pay for Housing in the Last Year: Not on file    Number of [place] in the Last Year: Not on file    Unstable Housing in the Last Year: Not on file         Family History:             Problem Relation Age of Onset    Arthritis Mother      Depression Mother      Heart Disease Father      Cancer Father           REVIEW OF SYSTEMS:    CONSTITUTIONAL: Denies recent weight loss, fatigue, fevers, chills. HEENT: Denies rhinorrhea, dysphagia, odynphagia. CARDIOVASCULAR: Denies history of MI, recent chest pain. RESPIRATORY: Denies recent history of shortness of breath or history of PE. GASTROINTESTINAL: + abd pain, no n/v  GENITOURINARY: Denies increased frequency or dysuria.   HEMATOLOGIC/LYMPHATIC: Denies history of anemia or DVTs.  ENDOCRINE: Denies history of thyroid problems or diabetes. NEURO: Denies history of CVA, TIA. Review of systems negative unless listed above.     PHYSICAL EXAM:    VITALS:  /74   Pulse 79   Temp 98.5 °F (36.9 °C) (Oral)   Resp 18   Ht 5' 4.5\" (1.638 m)   Wt 182 lb (82.6 kg)   SpO2 96%   BMI 30.76 kg/m²   INTAKE/OUTPUT:   No intake or output data in the 24 hours ending 06/06/22 2043     CONSTITUTIONAL:  awake, alert, not distressed and mildly obese  HEENT: Normocephalic/atraumatic, without obvious abnormality. NECK:  Supple, symmetrical, trachea midline   CARDIOVASCULAR: Regular rate and rhythm   LUNGS: Equal chest rise bilaterally   ABDOMEN: soft, mildly distended, LUQ abd tenderness, L sided pain with palpation on the right side. MUSCULOSKELETAL: Muscle strength intact in all extremities bilaterally. NEUROLOGIC: CN II- XII intact. Gross motor intact without focal weakness. SKIN: No cyanosis, rashes, or edema noted.    Orientation:   oriented to person, place, and time     CBC:         Lab Results   Component Value Date     WBC 7.7 06/06/2022     RBC 4.27 06/06/2022     HGB 12.9 06/06/2022     HCT 39.3 06/06/2022     MCV 92.0 06/06/2022     MCH 30.2 06/06/2022     MCHC 32.8 06/06/2022     RDW 11.7 06/06/2022      06/06/2022     MPV 10.5 06/06/2022      CMP:          Lab Results   Component Value Date      06/06/2022     K 3.9 06/06/2022      06/06/2022     CO2 23 06/06/2022     BUN 13 06/06/2022     CREATININE 0.47 06/06/2022     GFRAA >60 06/06/2022     LABGLOM >60 06/06/2022     GLUCOSE 94 06/06/2022     PROT 6.9 06/06/2022     LABALBU 4.3 06/06/2022     CALCIUM 9.6 06/06/2022     BILITOT 0.83 06/06/2022     ALKPHOS 76 06/06/2022     AST 21 06/06/2022     ALT 23 06/06/2022         Pertinent Radiology:   CT ABDOMEN PELVIS W IV CONTRAST Additional Contrast? Oral     Result Date: 6/6/2022  EXAMINATION: CT OF THE ABDOMEN AND PELVIS WITH CONTRAST 6/6/2022 2:55 pm TECHNIQUE: CT of the abdomen and pelvis was performed with the administration of intravenous contrast. Multiplanar reformatted images are provided for review. Automated exposure control, iterative reconstruction, and/or weight based adjustment of the mA/kV was utilized to reduce the radiation dose to as low as reasonably achievable. COMPARISON: CT abdomen pelvis 04/22/2019 HISTORY: ORDERING SYSTEM PROVIDED HISTORY: Hx Heaven-en-Y, epigastric pain TECHNOLOGIST PROVIDED HISTORY: Hx Heaven-en-Y, epigastric pain Decision Support Exception - unselect if not a suspected or confirmed emergency medical condition->Emergency Medical Condition (MA) FINDINGS: Lower Chest: Minimal dependent changes in the lung bases. No pleural or pericardial effusion. Mild distal esophageal wall thickening. Organs: Mildly distended gallbladder contains gallstones without any wall thickening or pericholecystic inflammatory change. Liver, spleen, pancreas, adrenal glands, and kidneys are without acute abnormality. GI/Bowel: No bowel obstruction. Heaven-en-Y gastric bypass. Enteric contrast is present within normal caliber small bowel but has yet to reach the distal ileum or colon. Normal appendix. Pelvis: Urinary bladder is grossly unremarkable. Hysterectomy. Fluid-filled structure in the left adnexa which appears multiloculated and somewhat serpiginous and tubular with the largest component measuring up to 5.8 cm, increased in size from 2019 comparison. Peritoneum/Retroperitoneum: No free fluid or free air. Mild nonspecific graying along the mesenteric fat. No adenopathy. Normal caliber aorta. Bones/Soft Tissues: Degenerative changes which are greatest at L5-S1. No acute or aggressive bony findings. No acute abnormality in the superficial soft tissues.      Heaven-en-Y gastric bypass without surrounding inflammation or associated bowel obstruction. Mild distal esophageal wall thickening. Correlate for esophagitis.  Cholelithiasis without cholecystitis. Mild nonspecific graying of the fat along the mesenteric root. Fluid-filled structure in the left adnexa with features favoring hydrosalpinx, increased in caliber from 2019 comparison. This could be further assessed on a routine basis with pelvic ultrasound.            ASSESSMENT:  1. Constipation, abd pain      Plan:  1. CT A/P without evidence of inflammation or obstruction, heavy stool burden  2. Recommend regular bowel regimen as outpatient  3. IV hydration  4. Cholelithiasis on CT. Will admit to MIS service and recommend robotic cholecystectomy tomorrow with diagnostic laparoscopy. I have discussed the care of the patient, including pertinent history and exam findings, with the resident. I have seen and examined the patient and the key elements of all parts of the encounter have been performed by me. I agree with the assessment, plan and orders as documented by the resident. Patient with concerns for possible internal hernia on my review of CT. Has gallstones and nausea with meals. Will plan for OR    Possibility that this does not resolve all her abdominal pain discussed.

## 2022-06-08 NOTE — CONSULTS
[facility]    Patient Name: Ryanne Cabrera     Patient : 1980  Room/Bed: 4657/4503-09  Admission Date/Time: 2022  4:10 PM  Primary Care Physician: Andres Jasmine MD    Consulting Provider: Dr. Sourav Pardo  Reason for Consult: Ovarian cyst    CC:   Chief Complaint   Patient presents with    Abdominal Pain     umbilical pain that radiates to the LUQ. gastric bypass 2019                HPI: Ryanne Cabrera is a 39 y.o. female  presented to the ED 00, c/o umbilical pain that radiated to the LUQ for the past two days. CT abd/pelvis was performed showing cholelithiasis and fluid filled structure in left adnexa. Patient was admitted to the bariatric team and robotic assisted laparoscopic cholecystectomy, EGD, and closure of mesenteric defect/internal hernia was performed 22. OBGYN was consulted due to finding of adnexal fluid filled structure on CT abd/pelvis. She reports a history of ovarian cysts. Patient reports she had a total abdominal hysterectomy and right salpingo-oophorectomy in 2016 by Dr. Saul Soria at [facility]. Patient states she followed up shortly after hysterectomy due to vaginal bleeding but has not followed up with an OBGYN since. She denies any vaginal bleeding. She denies any history of abnormal pap smears. She reports intermittent right and left lower quadrant sharp pain that has occurred since her hysterectomy, she states this occurs randomly. She denies any other concerns or complaints. No LMP recorded. Patient has had a hysterectomy.      REVIEW OF SYSTEMS:   Constitutional: negative fever, negative chills, negative weight changes   HEENT: negative visual disturbances, negative headaches, negative dizziness, negative hearing loss  Breast: Negative breast abnormalities, negative breast lumps, negative nipple discharge  Respiratory: negative dyspnea, negative cough, negative SOB  Cardiovascular: negative chest pain, negative palpitations, negative arrhythmia, negative syncope   Gastrointestinal: positive abdominal pain 2/2 postoperative state, negative RUQ pain, negative N/V, negative diarrhea, negative constipation, negative bowel changes, negative heartburn   Genitourinary: negative dysuria, negative hematuria, negative urinary incontinence, negative vaginal discharge, negative vaginal bleeding or spotting  Dermatological: negative rash, negative pruritis, negative mole or other skin changes  Hematologic: negative bruising  Immunologic/Lymphatic: negative recent illness, negative recent sick contact  Musculoskeletal: negative back pain, negative myalgias, negative arthralgias  Neurological:  negative dizziness, negative migraines, negative seizures, negative weakness  Behavior/Psych: negative depression, negative anxiety, negative SI, negative HI    _______________________________________________________________________      OBSTETRICAL HISTORY:   OB History   No obstetric history on file. PAST MEDICAL HISTORY:   has a past medical history of Back pain, Depression, Migraines, and Sleep apnea. PAST SURGICAL HISTORY:   has a past surgical history that includes Partial hysterectomy; Upper gastrointestinal endoscopy (N/A, 11/23/2018); Hysterectomy; Heaven-en-Y Gastric Bypass (N/A, 04/16/2019); Cholecystectomy, laparoscopic (06/07/2022); and Cholecystectomy, laparoscopic (N/A, 6/7/2022).     ALLERGIES:  Allergies as of 06/06/2022    (No Known Allergies)       MEDICATIONS:  Current Facility-Administered Medications   Medication Dose Route Frequency Provider Last Rate Last Admin    promethazine (PHENERGAN) injection 12.5 mg  12.5 mg IntraMUSCular Q6H PRN [place] Bering, DO   12.5 mg at 06/08/22 0812    morphine injection 4 mg  4 mg IntraVENous Q4H PRN [place] Bering, DO   4 mg at 06/08/22 1112    ketorolac (TORADOL) injection 15 mg  15 mg IntraVENous Q6H PRN [place] Bering, DO   15 mg at 06/08/22 1010    nitrofurantoin (macrocrystal-monohydrate) (MACROBID) capsule 100 mg  100 mg Oral 2 times per day Annie Abide, DO   100 mg at 06/08/22 5059    iohexol (OMNIPAQUE 240) injection 50 mL  50 mL Oral ONCE PRN Annie Abide, DO        sodium chloride flush 0.9 % injection 5-40 mL  5-40 mL IntraVENous 2 times per day Annie Abide, DO   10 mL at 06/08/22 0810    sodium chloride flush 0.9 % injection 5-40 mL  5-40 mL IntraVENous PRN Annie Abide, DO        0.9 % sodium chloride infusion   IntraVENous PRN Annie Abide, DO        enoxaparin (LOVENOX) injection 40 mg  40 mg SubCUTAneous 2 times per day Annie Abide, DO   40 mg at 06/06/22 2331    lactated ringers infusion   IntraVENous Continuous Annie Abide,  mL/hr at 06/06/22 2322 New Bag at 06/06/22 2322    acetaminophen (TYLENOL) tablet 1,000 mg  1,000 mg Oral Q8H PRN Annie Abide, DO        oxyCODONE-acetaminophen (PERCOCET) 5-325 MG per tablet 1 tablet  1 tablet Oral Q4H PRN Annie Abide, DO           FAMILY HISTORY:  Family History of Breast, Ovarian, Colon or Uterine Cancer: Yes extensive family history of colon cancer   family history includes Arthritis in her mother; Cancer in her father; Depression in her mother; Heart Disease in her father. SOCIAL HISTORY:   reports that she has never smoked. She has never used smokeless tobacco. She reports current alcohol use. She reports current drug use. Drug: Marijuana Savannah Irwin). ________________________________________________________________________                                    VITALS:  Vitals:    06/07/22 2000 06/08/22 0757 06/08/22 1032 06/08/22 1142   BP: 106/62 124/79 112/65    Pulse: 76 86 74    Resp: 16 16 14 14   Temp: 97.9 °F (36.6 °C) 98.2 °F (36.8 °C) 98.1 °F (36.7 °C)    TempSrc: Oral Oral Oral    SpO2: 98% 98% 97%    Weight:       Height:                                                        INPUT/OUTPUT:  No intake/output data recorded.   No intake/output data recorded. Physical Exam:  General appearance:  no apparent distress, alert and cooperative  HEENT: head atraumatic, normocephalic, moist mucous membranes, trachea midline  Neurologic:  alert, oriented, normal speech, no focal findings or movement disorder noted  Lungs:  No increased work of breathing, good air exchange, clear to auscultation bilaterally, no crackles or wheezing  Heart:  regular rate and rhythm and no murmur    Abdomen:  soft, generalized appropriate tenderness due to postoperative state, incisions C/D/I  Extremities:  no calf tenderness, non edematous, DTR's: +2/4 bilateral lower extremities   Musculoskeletal: Gross strength equal and intact throughout, no gross abnormalities, range of motion normal in hips, knees, shoulders and spine, CVA tenderness: none  Psychiatric: Mood appropriate, normal affect   Rectal Exam: not indicated  Pelvic Exam: not indicated      LAB RESULTS:  Results for orders placed or performed during the hospital encounter of 06/06/22   Culture, Urine    Specimen: Urine, clean catch   Result Value Ref Range    Specimen Description . CLEAN CATCH URINE     Culture ESCHERICHIA COLI >[id number] CFU/ML (A)    CBC with Auto Differential   Result Value Ref Range    WBC 7.7 3.5 - 11.3 k/uL    RBC 4.27 3.95 - 5.11 m/uL    Hemoglobin 12.9 11.9 - 15.1 g/dL    Hematocrit 39.3 36.3 - 47.1 %    MCV 92.0 82.6 - 102.9 fL    MCH 30.2 25.2 - 33.5 pg    MCHC 32.8 28.4 - 34.8 g/dL    RDW 11.7 (L) 11.8 - 14.4 %    Platelets 259 910 - 138 k/uL    MPV 10.5 8.1 - 13.5 fL    NRBC Automated 0.0 0.0 per 100 WBC    Seg Neutrophils 66 (H) 36 - 65 %    Lymphocytes 28 24 - 43 %    Monocytes 5 3 - 12 %    Eosinophils % 1 1 - 4 %    Basophils 0 0 - 2 %    Immature Granulocytes 0 0 %    Segs Absolute 5.09 1.50 - 8.10 k/uL    Absolute Lymph # 2.11 1.10 - 3.70 k/uL    Absolute Mono # 0.41 0.10 - 1.20 k/uL    Absolute Eos # 0.04 0.00 - 0.44 k/uL    Basophils Absolute <0.03 0.00 - 0.20 k/uL    Absolute Immature Granulocyte <0.03 0.00 - 0.30 k/uL   Comprehensive Metabolic Panel w/ Reflex to MG   Result Value Ref Range    Glucose 94 70 - 99 mg/dL    BUN 13 6 - 20 mg/dL    CREATININE 0.47 (L) 0.50 - 0.90 mg/dL    Calcium 9.6 8.6 - 10.4 mg/dL    Sodium 139 135 - 144 mmol/L    Potassium 3.9 3.7 - 5.3 mmol/L    Chloride 105 98 - 107 mmol/L    CO2 23 20 - 31 mmol/L    Anion Gap 11 9 - 17 mmol/L    Alkaline Phosphatase 76 35 - 104 U/L    ALT 23 5 - 33 U/L    AST 21 <32 U/L    Total Bilirubin 0.83 0.3 - 1.2 mg/dL    Total Protein 6.9 6.4 - 8.3 g/dL    Albumin 4.3 3.5 - 5.2 g/dL    Albumin/Globulin Ratio 1.7 1.0 - 2.5    GFR Non-African American >60 >60 mL/min    GFR African American >60 >60 mL/min    GFR Comment         Lipase   Result Value Ref Range    Lipase 36 13 - 60 U/L   Lactic Acid   Result Value Ref Range    Lactic Acid, Whole Blood 1.0 0.7 - 2.1 mmol/L   Urinalysis with Reflex to Culture    Specimen: Urine   Result Value Ref Range    Color, UA Yellow Yellow    Turbidity UA Clear Clear    Glucose, Ur NEGATIVE NEGATIVE    Bilirubin Urine NEGATIVE NEGATIVE    Ketones, Urine MODERATE (A) NEGATIVE    Specific Gravity, UA 1.031 (H) 1.005 - 1.030    Urine Hgb NEGATIVE NEGATIVE    pH, UA 5.5 5.0 - 8.0    Protein, UA NEGATIVE NEGATIVE    Urobilinogen, Urine Normal Normal    Nitrite, Urine POSITIVE (A) NEGATIVE    Leukocyte Esterase, Urine TRACE (A) NEGATIVE   CBC   Result Value Ref Range    WBC 6.2 3.5 - 11.3 k/uL    RBC 3.97 3.95 - 5.11 m/uL    Hemoglobin 11.9 11.9 - 15.1 g/dL    Hematocrit 35.7 (L) 36.3 - 47.1 %    MCV 89.9 82.6 - 102.9 fL    MCH 30.0 25.2 - 33.5 pg    MCHC 33.3 28.4 - 34.8 g/dL    RDW 11.8 11.8 - 14.4 %    Platelets 322 800 - 471 k/uL    MPV 10.5 8.1 - 13.5 fL    NRBC Automated 0.0 0.0 per 100 WBC   Comprehensive Metabolic Panel   Result Value Ref Range    Glucose 91 70 - 99 mg/dL    BUN 11 6 - 20 mg/dL    CREATININE 0.49 (L) 0.50 - 0.90 mg/dL    Calcium 9.2 8.6 - 10.4 mg/dL    Sodium 139 135 - 144 mmol/L    Potassium 4.1 3.7 - 5.3 mmol/L    Chloride 103 98 - 107 mmol/L    CO2 23 20 - 31 mmol/L    Anion Gap 13 9 - 17 mmol/L    Alkaline Phosphatase 67 35 - 104 U/L    ALT 19 5 - 33 U/L    AST 16 <32 U/L    Total Bilirubin 0.95 0.3 - 1.2 mg/dL    Total Protein 6.1 (L) 6.4 - 8.3 g/dL    Albumin 3.7 3.5 - 5.2 g/dL    Albumin/Globulin Ratio 1.5 1.0 - 2.5    GFR Non-African American >60 >60 mL/min    GFR African American >60 >60 mL/min    GFR Comment         Microscopic Urinalysis   Result Value Ref Range    -          WBC, UA 20 TO 50 0 - 5 /HPF    RBC, UA 5 TO 10 0 - 4 /HPF    Casts UA  0 - 8 /LPF     5 TO 10 HYALINE Reference range defined for non-centrifuged specimen.     Epithelial Cells UA 2 TO 5 0 - 5 /HPF    Bacteria, UA MANY (A) None   CBC   Result Value Ref Range    WBC 8.5 3.5 - 11.3 k/uL    RBC 4.27 3.95 - 5.11 m/uL    Hemoglobin 12.9 11.9 - 15.1 g/dL    Hematocrit 37.8 36.3 - 47.1 %    MCV 88.5 82.6 - 102.9 fL    MCH 30.2 25.2 - 33.5 pg    MCHC 34.1 28.4 - 34.8 g/dL    RDW 11.8 11.8 - 14.4 %    Platelets 485 227 - 953 k/uL    MPV 10.5 8.1 - 13.5 fL    NRBC Automated 0.0 0.0 per 100 WBC   Comprehensive Metabolic Panel   Result Value Ref Range    Glucose 120 (H) 70 - 99 mg/dL    BUN 9 6 - 20 mg/dL    CREATININE 0.45 (L) 0.50 - 0.90 mg/dL    Calcium 9.5 8.6 - 10.4 mg/dL    Sodium 139 135 - 144 mmol/L    Potassium 4.1 3.7 - 5.3 mmol/L    Chloride 102 98 - 107 mmol/L    CO2 26 20 - 31 mmol/L    Anion Gap 11 9 - 17 mmol/L    Alkaline Phosphatase 90 35 - 104 U/L    ALT 51 (H) 5 - 33 U/L    AST 51 (H) <32 U/L    Total Bilirubin 0.85 0.3 - 1.2 mg/dL    Total Protein 6.6 6.4 - 8.3 g/dL    Albumin 3.9 3.5 - 5.2 g/dL    Albumin/Globulin Ratio 1.4 1.0 - 2.5    GFR Non-African American >60 >60 mL/min    GFR African American >60 >60 mL/min    GFR Comment               DIAGNOSTICS:    CT ABDOMEN PELVIS W IV CONTRAST Additional Contrast? Oral    Result Date: 6/6/2022  EXAMINATION: CT OF THE ABDOMEN AND PELVIS WITH CONTRAST 6/6/2022 2:55 pm TECHNIQUE: CT of the abdomen and pelvis was performed with the administration of intravenous contrast. Multiplanar reformatted images are provided for review. Automated exposure control, iterative reconstruction, and/or weight based adjustment of the mA/kV was utilized to reduce the radiation dose to as low as reasonably achievable. COMPARISON: CT abdomen pelvis 04/22/2019 HISTORY: ORDERING SYSTEM PROVIDED HISTORY: Hx Heaven-en-Y, epigastric pain TECHNOLOGIST PROVIDED HISTORY: Hx Heaven-en-Y, epigastric pain Decision Support Exception - unselect if not a suspected or confirmed emergency medical condition->Emergency Medical Condition (MA) FINDINGS: Lower Chest: Minimal dependent changes in the lung bases. No pleural or pericardial effusion. Mild distal esophageal wall thickening. Organs: Mildly distended gallbladder contains gallstones without any wall thickening or pericholecystic inflammatory change. Liver, spleen, pancreas, adrenal glands, and kidneys are without acute abnormality. GI/Bowel: No bowel obstruction. Heaven-en-Y gastric bypass. Enteric contrast is present within normal caliber small bowel but has yet to reach the distal ileum or colon. Normal appendix. Pelvis: Urinary bladder is grossly unremarkable. Hysterectomy. Fluid-filled structure in the left adnexa which appears multiloculated and somewhat serpiginous and tubular with the largest component measuring up to 5.8 cm, increased in size from 2019 comparison. Peritoneum/Retroperitoneum: No free fluid or free air. Mild nonspecific graying along the mesenteric fat. No adenopathy. Normal caliber aorta. Bones/Soft Tissues: Degenerative changes which are greatest at L5-S1. No acute or aggressive bony findings. No acute abnormality in the superficial soft tissues.      Heaven-en-Y gastric bypass without surrounding inflammation or associated bowel obstruction. Mild distal esophageal wall thickening. Correlate for esophagitis. Cholelithiasis without cholecystitis. Mild nonspecific graying of the fat along the mesenteric root. Fluid-filled structure in the left adnexa with features favoring hydrosalpinx, increased in caliber from 2019 comparison. This could be further assessed on a routine basis with pelvic ultrasound. ASSESSMENT & PLAN:    Benito Campos is a 39 y.o. female     Pelvic mass   - Patient admitted for umbilical and LUQ pain and is s/p robotic assisted laparoscopic cholecystectomy, EGD, and closure of mesenteric defect/internal hernia 22   - OBGYN consulted due to adnexal fluid filled structure on CT abd/pelvis   - VSS, afebrile   - CBC and CMP unremarkable   - UCx positive E. Coli, treatment per primary with Macrobid   - Patient is s/p GINNY, right salpingo-oophorectomy in 2016, reports history of ovarian cysts   - Generalized tenderness on abdominal exam due to postoperative state   - CT abd/pelvis 22 showing s/p hysterectomy, fluid filled structure in left adnexa which is multiloculated, serpiginous, and tubular measuring up to 5.8cm which is increased in size from 2019 (4.3cm)   - CA-125, CEA ordered   - TVUS ordered   - Patient desires outpatient follow up with possible surgical management of pelvic mass pending results of TVUS and labs   - OBGYN will be signing off. Please page with any questions. Patient can follow up with [facility] outpatient for further work up. Patient Active Problem List    Diagnosis Date Noted    Abdominal pain 2022     Priority: Medium    Obesity (BMI 30-39.9) 2019    Status post gastric bypass for obesity 2019    Vitamin D deficiency 2019    CURLY (obstructive sleep apnea) 2018    History of depression 2018       Plan discussed with Dr. Indio Fu, who is agreeable.      Attending's Name: Dr. Nolan Ohara Beatris Espinoza,   Ob/Gyn Resident    [address]  6/8/2022, 2:20 PM

## 2022-06-08 NOTE — PLAN OF CARE
Problem: Discharge Planning  Goal: Discharge to home or other facility with appropriate resources  6/7/2022 2149 by Candice Thompson RN  Outcome: Progressing  6/7/2022 1215 by Shankar Vega RN  Outcome: Progressing     Problem: Pain  Goal: Verbalizes/displays adequate comfort level or baseline comfort level  6/7/2022 2149 by Candice Thompson RN  Outcome: Progressing  6/7/2022 1215 by Shankar Vega RN  Outcome: Progressing     Problem: Skin/Tissue Integrity  Goal: Absence of new skin breakdown  Description: 1. Monitor for areas of redness and/or skin breakdown  2. Assess vascular access sites hourly  3. Every 4-6 hours minimum:  Change oxygen saturation probe site  4. Every 4-6 hours:  If on nasal continuous positive airway pressure, respiratory therapy assess nares and determine need for appliance change or resting period.   6/7/2022 2149 by Candice Thompson RN  Outcome: Progressing  6/7/2022 1215 by Shankar Vega RN  Outcome: Progressing

## 2022-06-08 NOTE — CARE COORDINATION
Spoke with patient re: discharge, she states she is going home with support from her , she states she has no needs, her home is a one story with 1 step to enter.

## 2022-06-08 NOTE — PROGRESS NOTES
Bariatric Surgery:  Daily Progress Note              PATIENT NAME: Lona Ohm Schober     TODAY'S DATE: 6/8/2022, 7:53 AM  CC:  Mild nausea    SUBJECTIVE:     Pt seen and examined at bedside. No acute events overnight. Some post op pain but it is controlled. Mild nausea but no emesis. Tolerating PO. Having bowel function. OBJECTIVE:   VITALS:  /62   Pulse 76   Temp 97.9 °F (36.6 °C) (Oral)   Resp 16   Ht 5' 4.5\" (1.638 m)   Wt 182 lb (82.6 kg)   SpO2 98%   BMI 30.76 kg/m²      INTAKE/OUTPUT:    No intake or output data in the 24 hours ending 06/08/22 0753    PHYSICAL EXAM:  General Appearance: awake, alert, oriented, in no acute distress  HEENT:  Normocephalic, atraumatic, mucus membranes moist   Heart: Heart regular rate and rhythm  Lungs: Normal expansion. Clear to auscultation. No rales, rhonchi, or wheezing. Abdomen:sft, tender to palpation epigastric, non distended, incisions c/d/i  Extremities: No cyanosis, pitting edema, rashes noted. Skin: Skin color, texture, turgor normal. No rashes or lesions.     Data:  CBC with Differential:    Lab Results   Component Value Date    WBC 8.5 06/08/2022    RBC 4.27 06/08/2022    HGB 12.9 06/08/2022    HCT 37.8 06/08/2022     06/08/2022    MCV 88.5 06/08/2022    MCH 30.2 06/08/2022    MCHC 34.1 06/08/2022    RDW 11.8 06/08/2022    LYMPHOPCT 28 06/06/2022    LYMPHOPCT 37.0 05/27/2020    MONOPCT 5 06/06/2022    MONOPCT 5.0 05/27/2020    EOSPCT 1.2 05/27/2020    BASOPCT 0 06/06/2022    BASOPCT 0.2 05/27/2020    MONOSABS 0.41 06/06/2022    MONOSABS 0.29 05/27/2020    LYMPHSABS 2.11 06/06/2022    LYMPHSABS 2.15 05/27/2020    EOSABS 0.04 06/06/2022    EOSABS 0.7 05/27/2020    BASOSABS <0.03 06/06/2022    DIFFTYPE NOT REPORTED 08/26/2019     CMP:    Lab Results   Component Value Date     06/08/2022    K 4.1 06/08/2022     06/08/2022    CO2 26 06/08/2022    BUN 9 06/08/2022    CREATININE 0.45 06/08/2022    GFRAA >60 06/08/2022 LABGLOM >60 06/08/2022    GLUCOSE 120 06/08/2022    PROT 6.6 06/08/2022    LABALBU 3.9 06/08/2022    CALCIUM 9.5 06/08/2022    BILITOT 0.85 06/08/2022    ALKPHOS 90 06/08/2022    AST 51 06/08/2022    ALT 51 06/08/2022       Radiology Review:    CT ABDOMEN PELVIS W IV CONTRAST Additional Contrast? Oral    Result Date: 6/6/2022  Heaven-en-Y gastric bypass without surrounding inflammation or associated bowel obstruction. Mild distal esophageal wall thickening. Correlate for esophagitis. Cholelithiasis without cholecystitis. Mild nonspecific graying of the fat along the mesenteric root. Fluid-filled structure in the left adnexa with features favoring hydrosalpinx, increased in caliber from 2019 comparison. This could be further assessed on a routine basis with pelvic ultrasound. ASSESSMENT:  Active Hospital Problems    Diagnosis Date Noted    Abdominal pain [R10.9] 06/06/2022     Priority: Medium       39 y.o. female with left and epigastric abdominal pain  6/7: robo cholecystectomy, repair mesenteric defect, EGD    Plan:  1. Cont pain control PO>IV  2. General diet  3. DC IVF  4. OOB, Ambulate  5. IS, Deep breath  6.  Likely DC today    Electronically signed by David David DO  on 6/8/2022 at 7:53 AM

## 2022-06-08 NOTE — PLAN OF CARE
Problem: Discharge Planning  Goal: Discharge to home or other facility with appropriate resources  Outcome: Completed     Problem: Pain  Goal: Verbalizes/displays adequate comfort level or baseline comfort level  Outcome: Completed     Problem: Skin/Tissue Integrity  Goal: Absence of new skin breakdown  Description: 1. Monitor for areas of redness and/or skin breakdown  2. Assess vascular access sites hourly  3. Every 4-6 hours minimum:  Change oxygen saturation probe site  4. Every 4-6 hours:  If on nasal continuous positive airway pressure, respiratory therapy assess nares and determine need for appliance change or resting period.   Outcome: Completed

## 2022-06-09 LAB
CULTURE: ABNORMAL
SPECIMEN DESCRIPTION: ABNORMAL
SURGICAL PATHOLOGY REPORT: NORMAL

## 2022-06-13 ENCOUNTER — TELEPHONE (OUTPATIENT)
Dept: OBGYN | Age: 42
End: 2022-06-13

## 2022-06-13 NOTE — DISCHARGE SUMMARY
Surgery Discharge Summary     Patient Identification  John Gimenez is a 39 y.o. female. :  1980  Admit Date:  2022    Discharge date:   2022  6:12 PM                                   Disposition: home    Discharge Diagnoses:   Patient Active Problem List   Diagnosis    History of depression    CURLY (obstructive sleep apnea)    Vitamin D deficiency    Status post gastric bypass for obesity    Obesity (BMI 30-39. 9)    Abdominal pain    S/P total abdominal hysterectomy (2016)    Pelvic mass    Chronic cholecystitis with calculus    Epigastric abdominal pain    Abdominal pain, epigastric       Condition on discharge: good    Consults: none    Surgery: robo cholecystectomy, repair of mesenteric defect, EGD    Patient Instructions: Activity: no heavy lifting, pushing, pulling for 6 weeks, no driving for 2 weeks or while on analgesics  Diet: As tolerated  Follow-up with Delphine Wolf in 1 week. See pre-printed instructions in chart and given to patient upon discharge. Discharge Medications:        Medication List      START taking these medications    HYDROcodone-acetaminophen 5-325 MG per tablet  Commonly known as: Norco  Take 1 tablet by mouth every 6 hours as needed for Pain for up to 5 days. Intended supply: 5 days. Take lowest dose possible to manage pain     lidocaine 5 %  Commonly known as: LIDODERM  Place 1 patch onto the skin daily for 10 days 12 hours on, 12 hours off.     promethazine 25 MG tablet  Commonly known as: PHENERGAN  Take 1 tablet by mouth every 6 hours as needed for Nausea WARNING:  May cause drowsiness. May impair ability to operate vehicles or machinery. Do not use in combination with alcohol.         CONTINUE taking these medications    amphetamine-dextroamphetamine 20 MG extended release capsule  Commonly known as: ADDERALL XR     clonazePAM 0.5 MG tablet  Commonly known as: KLONOPIN     therapeutic multivitamin-minerals tablet     TUMS PO     vitamin D 1.25 MG (95305 UT) Caps capsule  Commonly known as: ERGOCALCIFEROL  Take 1 capsule by mouth once a week for 8 doses        ASK your doctor about these medications    nitrofurantoin (macrocrystal-monohydrate) 100 MG capsule  Commonly known as: MACROBID  Take 1 capsule by mouth every 12 hours for 8 doses  Ask about: Should I take this medication? Where to Get Your Medications      You can get these medications from any pharmacy    Bring a paper prescription for each of these medications  · HYDROcodone-acetaminophen 5-325 MG per tablet  · lidocaine 5 %  · nitrofurantoin (macrocrystal-monohydrate) 100 MG capsule  · promethazine 25 MG tablet          HPI and Hospital Course:   39 y.o. female presented on 6/6/2022 for upper abdominal pain. Underwent robotic assisted laparoscopic cholecystectomy , repair of mesenteric defect, and EGD. Patient tolerated the procedure well. Diet was started and advanced post operatively. Patient did well. Was tolerating a diet, ambulating, pain was controlled. Hospital course was unremarkable. On day of discharge pt was tolerating regular diet, pain controlled with oral medications and ambulating without difficulty. PHYSICAL EXAM:  General Appearance: awake, alert, oriented, in no acute distress  HEENT:  Normocephalic, atraumatic, mucus membranes moist   Heart: Heart regular rate and rhythm  Lungs: Normal expansion. Clear to auscultation. No rales, rhonchi, or wheezing. Abdomen:sft, tender to palpation epigastric, non distended, incisions c/d/i  Extremities: No cyanosis, pitting edema, rashes noted. Skin: Skin color, texture, turgor normal. No rashes or lesions.     Electronically signed by Mable Magallon DO on 6/13/2022 at 5:06 PM

## 2022-06-13 NOTE — TELEPHONE ENCOUNTER
Pt was seen at [facility] [email or web address] on 6/6 and was told to follow up with [facility] follow up, writer unable to find an appt or reach clinical staff. Please contact pt.

## 2022-06-14 ENCOUNTER — OFFICE VISIT (OUTPATIENT)
Dept: OBGYN | Age: 42
End: 2022-06-14
Payer: COMMERCIAL

## 2022-06-14 VITALS
WEIGHT: 190 LBS | BODY MASS INDEX: 32.11 KG/M2 | HEART RATE: 87 BPM | SYSTOLIC BLOOD PRESSURE: 112 MMHG | DIASTOLIC BLOOD PRESSURE: 71 MMHG

## 2022-06-14 DIAGNOSIS — N83.202 LEFT OVARIAN CYST: Primary | ICD-10-CM

## 2022-06-14 PROCEDURE — 1036F TOBACCO NON-USER: CPT | Performed by: STUDENT IN AN ORGANIZED HEALTH CARE EDUCATION/TRAINING PROGRAM

## 2022-06-14 PROCEDURE — 99213 OFFICE O/P EST LOW 20 MIN: CPT | Performed by: STUDENT IN AN ORGANIZED HEALTH CARE EDUCATION/TRAINING PROGRAM

## 2022-06-14 PROCEDURE — G8419 CALC BMI OUT NRM PARAM NOF/U: HCPCS | Performed by: STUDENT IN AN ORGANIZED HEALTH CARE EDUCATION/TRAINING PROGRAM

## 2022-06-14 PROCEDURE — 1111F DSCHRG MED/CURRENT MED MERGE: CPT | Performed by: STUDENT IN AN ORGANIZED HEALTH CARE EDUCATION/TRAINING PROGRAM

## 2022-06-14 PROCEDURE — G8427 DOCREV CUR MEDS BY ELIG CLIN: HCPCS | Performed by: STUDENT IN AN ORGANIZED HEALTH CARE EDUCATION/TRAINING PROGRAM

## 2022-06-14 NOTE — PROGRESS NOTES
Physician Progress Note      PATIENT:               Milind Reveles  CSN #:                  [id number]  :                       1980  ADMIT DATE:       2022 4:10 PM  DISCH DATE:        2022 6:12 PM  RESPONDING  PROVIDER #:        Christina SALDANA DO          QUERY TEXT:    Patient admitted with Abdominal pain s/p Cholecystectomy and noted to have   Urine Cx positive for E Coli-documented per OB GYN. If possible, please   clarify one of the following: The medical record reflects the following:  Risk Factors: Abd pain  Clinical Indicators: Pt to ED with abd pain/Nausea. Cholelithiasis per CT -s/p   Cholecystectomy . Urine Cx on admit showed many bacteria,positive   Nitrates,Trace Leukocytes,Moderate ketones with positive E Coli. Per OBGYN   consult note ' UCx positive E. Coli, treatment per primary with Macrobid.'  Treatment: Ob gyn consult, Macrobid po    Thank you, Jammie DELANEY  email - [email or web address]  cell- [phone]  office hours [id number]  Options provided:  -- positive UTI  -- UTI not clinically significant  -- Other - I will add my own diagnosis  -- Disagree - Not applicable / Not valid  -- Disagree - Clinically unable to determine / Unknown  -- Refer to Clinical Documentation Reviewer    PROVIDER RESPONSE TEXT:    Patient had UTI on time of arrival to hospital    Query created by: Dioni Mchugh on 2022 12:11 PM      Electronically signed by:  Santosh Lobo DO 2022 7:21 PM

## 2022-06-14 NOTE — PROGRESS NOTES
Edwin Donovnaober  6/14/2022    YOB: 1980    HPI:  Catalina Santoyo is a 39 y.o. female    The patient was seen and examined today. She is here for ultrasound follow up. Patient was seen as a consult on 6/8/22 for incidental finding of an ovarian cyst. Left ovarian cyst contains multiple small simple cysts with the largest simple cyst measuring 5.1 x 5.2 x 5.9 cm. She has no previous US for comparison. She had a CT pelvis from April 2019 that showed a left adnexal cyst with largest simple cyst measuring 4.3 cm. Patient has no pelvic pain, no abdominal pain and no vaginal bleeding. She is s/p GINNY with right salpingoophorectomy. Had hyst done for heavy uterine bleeding. Reports all pap smears were normal previously. She states her gynecologist left her left ovary for hormonal benefit. She states she always had ovarian cysts prior. OB History   No obstetric history on file.      Past Medical History:   Diagnosis Date    Back pain     Depression     Migraines     Sleep apnea        Past Surgical History:   Procedure Laterality Date    CHOLECYSTECTOMY, LAPAROSCOPIC  06/07/2022    CHOLECYSTECTOMY LAPAROSCOPIC ROBOTIC, EGD, CLOSURE OF MESENTERIC DEFECT    CHOLECYSTECTOMY, LAPAROSCOPIC N/A 6/7/2022    CHOLECYSTECTOMY LAPAROSCOPIC ROBOTIC, EGD, CLOSURE OF MESENTERIC DEFECT performed by Ness Talbert DO at [address] [place] [address] Daisy (CERVIX STATUS UNKNOWN)      PARTIAL HYSTERECTOMY (CERVIX NOT REMOVED)      BRIAN-EN-Y GASTRIC BYPASS N/A 04/16/2019    XI ROBOTIC LAPAROSCOPIC GASTRIC BYPASS BRIAN-EN-Y, LIVER BIOPSY, EGD - GI SCHEDULED performed by Ness Talbert DO at [place] [address] N/A 11/23/2018    EGD BIOPSY performed by Ness Talbert DO at [facility]       Family History   Problem Relation Age of Onset    Arthritis Mother     Depression Mother     Heart Disease Father     Cancer Father        Social History     Socioeconomic History    Marital status:      Spouse name: Not on file    Number of children: Not on file    Years of education: Not on file    Highest education level: Not on file   Occupational History    Not on file   Tobacco Use    Smoking status: Never Smoker    Smokeless tobacco: Never Used   Vaping Use    Vaping Use: Never used   Substance and Sexual Activity    Alcohol use: Yes    Drug use: Yes     Types: Marijuana Milo Chowdary)     Comment: last used 6 months ago    Sexual activity: Not on file   Other Topics Concern    Not on file   Social History Narrative    Not on file     Social Determinants of Health     Financial Resource Strain:     Difficulty of Paying Living Expenses: Not on file   Food Insecurity:     Worried About Running Out of Food in the Last Year: Not on file    Frantz of Food in the Last Year: Not on file   Transportation Needs:     Lack of Transportation (Medical): Not on file    Lack of Transportation (Non-Medical):  Not on file   Physical Activity:     Days of Exercise per Week: Not on file    Minutes of Exercise per Session: Not on file   Stress:     Feeling of Stress : Not on file   Social Connections:     Frequency of Communication with Friends and Family: Not on file    Frequency of Social Gatherings with Friends and Family: Not on file    Attends Jain Services: Not on file    Active Member of [address] [place] [address] or Organizations: Not on file    Attends Club or Organization Meetings: Not on file    Marital Status: Not on file   Intimate Partner Violence:     Fear of Current or Ex-Partner: Not on file    Emotionally Abused: Not on file    Physically Abused: Not on file    Sexually Abused: Not on file   Housing Stability:     Unable to Pay for Housing in the Last Year: Not on file    Number of [place] in the Last Year: Not on file    Unstable Housing in the Last Year: Not on file       MEDICATIONS:  Current Outpatient Medications   Medication Sig Dispense Refill    promethazine (PHENERGAN) 25 MG tablet Take 1 tablet by mouth every 6 hours as needed for Nausea WARNING:  May cause drowsiness. May impair ability to operate vehicles or machinery. Do not use in combination with alcohol. 20 tablet 0    lidocaine (LIDODERM) 5 % Place 1 patch onto the skin daily for 10 days 12 hours on, 12 hours off. 10 patch 0    Calcium Carbonate Antacid (TUMS PO) Take by mouth      Multiple Vitamins-Minerals (THERAPEUTIC MULTIVITAMIN-MINERALS) tablet Take by mouth      vitamin D (ERGOCALCIFEROL) 1.25 MG (04977 UT) CAPS capsule Take 1 capsule by mouth once a week for 8 doses 8 capsule 0    amphetamine-dextroamphetamine (ADDERALL XR) 20 MG extended release capsule  (Patient not taking: Reported on 6/6/2022)      clonazePAM (KLONOPIN) 0.5 MG tablet Take 0.5 mg by mouth 2 times daily as needed. (Patient not taking: Reported on 6/6/2022)       No current facility-administered medications for this visit.        ALLERGIES:  Allergies as of 06/14/2022    (No Known Allergies)     REVIEW OF SYSTEMS:   Constitutional: negative fever, negative chills, negative weight changes   HEENT: negative visual disturbances, negative headaches, negative dizziness, negative hearing loss  Breast: Negative breast abnormalities, negative breast lumps   Respiratory: negative dyspnea, negative cough, negative SOB  Cardiovascular: negative chest pain,  negative palpitations, negative arrhythmia, negative syncope   Gastrointestinal: negative abdominal pain, negative N/V, negative diarrhea, negative constipation, negative bowel changes, negative heartburn   Genitourinary: negative dysuria, negative hematuria, negative urinary incontinence, negative vaginal discharge  Dermatological: negative rash, negative pruritis, negative mole changes  Hematologic: negative bruising  Immunologic/Lymphatic: negative recent illness, negative recent sick contact  Musculoskeletal: negative back pain, negative myalgias, negative arthralgias  Neurological:  negative dizziness, negative migraines   Behavior/Psych: negative depression, negative anxiety     VITALS:  Vitals:    06/14/22 1559   BP: 112/71   Pulse: 87   Weight: 190 lb (86.2 kg)     PHYSICAL EXAM:  General appearance: no apparent distress, alert and cooperative  HEENT: head atraumatic, normocephalic, trachea midline, moist mucous membranes   Neurologic: alert, oriented, normal speech, no focal findings or movement disorder noted  Lungs: no increased work of breathing, good air exchange   Heart:RRR  Abdomen: soft, non-tender on palpation,   no guarding, no rebound, no rigidity   Extremities:  no calf tenderness bilaterally, non-edematous bilaterally   Musculoskeletal: no gross abnormalities, range of motion appropriate for age   Psychiatric: mood appropriate, normal affect   Pelvic Exam:declined   Breast Exam:declined     Diagnostics:  US NON OB TRANSVAGINAL  Result Date: 6/8/2022    EXAMINATION: PELVIC ULTRASOUND 6/8/2022 TECHNIQUE: Transvaginal pelvic ultrasound duplex ultrasound using B-mode/gray scaled imaging, Doppler spectral analysis and color flow Doppler was obtained. COMPARISON: None HISTORY: ORDERING SYSTEM PROVIDED HISTORY: concern for ovarian cyst TECHNOLOGIST PROVIDED HISTORY: concern for ovarian cyst FINDINGS: Uterus has been surgically removed. Right ovary has been surgically removed. Left ovary measures 7.7 x 5.5 by 6.2 cm. Left ovary contains multiple small simple cysts/follicles with the largest simple cyst measuring 5.1 x 5.2 x 5.9 1 cm. The finding is most likely benign however considering the size follow-up is recommended within 6-8 weeks. Left ovary contains multiple small simple cysts/follicles with the largest simple cyst measuring 5.1 x 5.2 x 5.9  cm. The finding is most likely benign however considering the size follow-up is recommended within 6-12 weeks to ensure stability or resolution. Blanchie Bevels Uterus and right ovary are surgically absent. RECOMMENDATIONS: Managing Incidental Adnexal Cystic Mass by CT or MR Benign cyst: Premenopausal (< or equal to 50 years if LMP unknown) < or equal to 5 cm: no follow up >5 cm: US in 6-12 weeks > or equal to 10 cm: US promptly Reference: Verlena Bernheim et al. Managing Incidental Findings on Abdominal CT: White Paper of the ACR Incidental Findings Committee. J Am Lenny Radiol 2010;7:754-773 Unavailable      Lab Results:     Result Value Ref Range     19 <38 U/mL   CEA   Result Value Ref Range    CEA 1.0 <3.9 ng/mL     Assessment/Plan:    Left Ovarian Cyst    - VSS   - TVUS June 2022 left ovary with multiple small simple cysts with largest measuring 5.1 x 5.2 x 5.9 cm    - Discussed with patient how from CT scan to ultrasound, the largest ovarian cyst only grew about 1 cm. Explained how ultrasound is usually how we measure ovarian cysts so the comparison might not be accurate but since the cyst barely grew in 3 years we can wait 6 months to see if there is any further growth. She is in agreement with this plan since tumor markers were negative and she is having no irregular bleeding or pelvic pain    - Last mammogram was in the fall and she reports it was normal    - Patient will continue to follow with us for GYN care as her primary gyn has retired    - Will repeat ultrasound in 6 months then she will follow up for well women exam to discuss those results    - She prefers a female resident    - Encouraged her to get ultrasound completed sooner if pelvic symptoms present      Diagnosis Orders   1.  Left ovarian cyst  US PELVIS COMPLETE    US NON OB TRANSVAGINAL     Patient Active Problem List    Diagnosis Date Noted    Chronic cholecystitis with calculus      Priority: Medium    Epigastric abdominal pain      Priority: Medium    Abdominal pain, epigastric      Priority: Medium    Pelvic mass 06/08/2022     Priority: Medium     : wnl  CEA: wnl  TVUS 6/8: Left ovary contains multiple small simple cysts/follicles with the largest   simple cyst measuring 5.1 x 5.2 x 5.9  cm.  The finding is most likely benign   however considering the size follow-up is recommended within 6-12 weeks to   ensure stability or resolution. Uterus and right ovary are surgically absent.  Abdominal pain 2022     Priority: Medium    S/P total abdominal hysterectomy (2016) 2022     Due to heavy bleeding      Obesity (BMI 30-39.9) 2019    Status post gastric bypass for obesity 2019    Vitamin D deficiency 2019    CURLY (obstructive sleep apnea) 2018    History of depression 2018        Orders Placed This Encounter   Procedures    US PELVIS COMPLETE     This procedure can be scheduled via [facility]. Access your [facility] account by visiting [email or web address]. Standing Status:   Future     Standing Expiration Date:   2023     Order Specific Question:   Reason for exam:     Answer:   left ovarian cyst found 2022    US NON OB TRANSVAGINAL     Begin with transabdominal imaging. Standing Status:   Future     Standing Expiration Date:   2023     Order Specific Question:   Reason for exam:     Answer:   left ovarian cyst found in 2022        Linda Mueller DO   OB/GYN Resident    [place], [place]  2022, 5:23 PM    Date: 2022  Time: 8:02 AM      Patient Name: Ryanne Cabrera  Patient : 1980  Room/Bed: Room/bed info not found  Admission Date/Time: No admission date for patient encounter. Attending Physician Statement  I have discussed the care of Ryanne Cabrera, including pertinent history and exam findings with the resident. I have reviewed and edited their note in the electronic medical record. The key elements of all parts of the encounter have been performed/reviewed by me . I agree with the assessment, plan and orders as documented by the resident.      The level of care submitted represents to the best of my ability the care documented in the medical record today. GE Modifier. This service has been performed in part by a resident under the direction of a teaching physician.         Attending's Name:  Celso Penn DO

## 2022-06-15 ENCOUNTER — OFFICE VISIT (OUTPATIENT)
Dept: BARIATRICS/WEIGHT MGMT | Age: 42
End: 2022-06-15

## 2022-06-15 VITALS
DIASTOLIC BLOOD PRESSURE: 70 MMHG | BODY MASS INDEX: 31.49 KG/M2 | RESPIRATION RATE: 16 BRPM | WEIGHT: 189 LBS | TEMPERATURE: 98.3 F | HEIGHT: 65 IN | HEART RATE: 76 BPM | SYSTOLIC BLOOD PRESSURE: 110 MMHG

## 2022-06-15 DIAGNOSIS — Z90.49 S/P CHOLECYSTECTOMY: Primary | ICD-10-CM

## 2022-06-15 DIAGNOSIS — Z98.84 STATUS POST GASTRIC BYPASS FOR OBESITY: ICD-10-CM

## 2022-06-15 DIAGNOSIS — Z09 POSTOPERATIVE EXAMINATION: ICD-10-CM

## 2022-06-15 PROCEDURE — 99024 POSTOP FOLLOW-UP VISIT: CPT | Performed by: SURGERY

## 2022-06-15 NOTE — PROGRESS NOTES
[address] [facility] MIN INVASIVE BARIATRIC SURG  [address] [place] [address] [place]  [address] [place] [id number]  Dept: [phone]    SURGICAL WEIGHT LOSS MANAGEMENT PROGRAM  PROGRESS NOTE     CC: Weight Loss    Patient: Mert Miner      Service Date: 6/15/2022  Visit:  21 month   Medical Record #: [id number]  Date of Surgery:   4/16/2019    Reason for Visit: Routine Post-Operative:  [] New Problem /   [] FU of existing problem    Patient here for 1 week visit after cholecystectomy. No abdominal pain. No nausea. Tolerates diet without event. Feels improved. Saw OB for her cyst    Height: 5' 4.5\" (1.638 m)  Highest Weight:   280 lbs    Current Visit Weight Information  Weight: 189 lb (85.7 kg)   BMI: Body mass index is 31.94 kg/m². Weight loss since surgery:     91    1 wk - D/C'd home on VTE Prohylaxis? [x] Yes   [] No   On VTE Proph as directed? [] Yes   [x] No     [Labs to be drawn prior to 1m, 3m, 6m, 12m, 18m, and annual visits]    Liver pathology:    [x] NA    [] No Gross path    [] Liver Steatosis   [] Discussed w/ pt   [] Referred to GI    Exercising?    [] Yes    [x] No       Review of Systems:     General  Negative for: [] Weight Change   [x] Fatigue   [x] Fevers & Chills    [] Appetite change [] Other:    Positive for: [x] Weight Change   [] Fatigue   [] Fevers & Chills    [] Appetite change [] Other:   Cardiac  Negative for: [x] Chest Pain   [x] Difficulty Breathing   [] Leg Cramps [x] Edema of Lower Extremeties    [] Left   [] Right      Positive for: [] Chest Pain   [] Difficulty Breathing   [] Leg Cramps [] Edema of Lower Extremeties    [] Left   [] Right   Pulmonary  Negative for: [x] Shortness of Breath [] Wheeze [x] Cough  [] Calf Pain     Positive for: [] Shortness of Breath [] Wheeze [] Cough  [] Calf Pain   Gastro-Intestinal Negative for: [] Heartburn   [] Reflux   [] Dysphagia   [] Melena   [] BRBPR  [x] Vomiting   [x] Abdominal Pain   [] Diarrhea     [] Constipation  [] Other:     Positive for: [] Heartburn   [] Reflux   [] Dysphagia   [] Melena   [] BRBPR  [] Vomiting   [] Abdominal Pain   [] Diarrhea     [] Constipation  [] Other:    Muskuloskeletal Negative for: [] Joint pain   [] Back pain   [] Knee Pain   [x] Muscle weakness [x] Hernia   [] Edema [] Other:     Positive for: [] Joint pain   [] Back pain   [] Knee Pain   [] Muscle weakness [] Hernia   [] Edema [] Other:    Neurologic Negative for: [x] Syncope   [x] Insomnia   [] Being treated for depression  [] Other:     Positive for: [] Syncope   [] Insomnia   [] Being treated for depression  [] Other:    Skin Negative for: [] Wound:   [] Open   [] Draining   [] Incisional     [x] Rash   [x] Hair Loss  [] Other:     Positive for: [] Wound:   [] Open   [] Draining    [] Incisional  [] Rash   [] Hair Loss  [] Other:            Physical Assessment:     /70 (Site: Right Upper Arm, Position: Sitting, Cuff Size: Large Adult)   Pulse 76   Temp 98.3 °F (36.8 °C) (Oral)   Resp 16   Ht 5' 4.5\" (1.638 m)   Wt 189 lb (85.7 kg)   BMI 31.94 kg/m²   Constitutional:  Vital signs are normal. The patient appears well-developed and well-nourished. HEENT:   Head: Normocephalic. Atraumatic  Eyes: pupils are equal and reactive. No scleral icterus is present. Neck: No mass and no thyromegaly present. Cardiovascular: Normal rate, regular rhythm, S1 normal and S2 normal.  Radial pulses present   Pulmonary/Chest: Effort normal and breath sounds normal. No retractions  Abdominal: Soft. Normal appearance. There is no organomegaly. No tenderness. There is no rigidity, no rebound, no guarding and no Stein's sign. Musculoskeletal:        Right lower leg: Normal. No tenderness and no edema. Left lower leg: Normal. No tenderness and no edema. Neurological: The patient is alert and oriented. Moving all 4 extremities, sensation grossly intact bilateral  Skin: Skin is warm, dry and intact. Psychiatric: The patient has a normal mood and affect. Speech is normal and behavior is normal. Judgment and thought content normal. Cognition and memory are normal.       Assessment & Plan:      1. Status post gastric bypass for obesity              [x] Advance Diet    [x] Daily protein (65-75gm/day)   [x] Take Vitamins   [x] Attend Support Group    [x] Exercise Regularly     [x] Use contraception:    [] NA    [] s/p Hysterectomy   [] s/p Tubal ligation   [] Other:     Pelvic Cyst, saw OB, she will follow with them    Exercise, continue current    Limit carbs    No lifting for 1 month    Follow up at yearly visits    Dietician visit today    Follow up: No follow-ups on file. Orders placed this encounter:   No orders of the defined types were placed in this encounter. New Prescriptions:   No orders of the defined types were placed in this encounter. Electronically signed by Keely Khan DO on 6/19/2022 at 10:50 AM    Please note that this chart was generated using voice recognition Dragon dictation software. Although every effort was made to ensure the accuracy of this automated transcription, some errors in transcription may have occurred.

## 2022-06-19 PROBLEM — Z90.49 S/P CHOLECYSTECTOMY: Status: ACTIVE | Noted: 2022-06-19

## 2022-07-01 ENCOUNTER — ANESTHESIA (OUTPATIENT)
Dept: OPERATING ROOM | Age: 42
End: 2022-07-01
Payer: COMMERCIAL

## 2022-07-01 ENCOUNTER — ANESTHESIA EVENT (OUTPATIENT)
Dept: OPERATING ROOM | Age: 42
End: 2022-07-01
Payer: COMMERCIAL

## 2022-07-01 ENCOUNTER — HOSPITAL ENCOUNTER (OUTPATIENT)
Age: 42
Setting detail: OUTPATIENT SURGERY
Discharge: HOME | End: 2022-07-01
Attending: SURGERY | Admitting: SURGERY
Payer: COMMERCIAL

## 2022-07-01 VITALS
SYSTOLIC BLOOD PRESSURE: 111 MMHG | WEIGHT: 181.6 LBS | RESPIRATION RATE: 13 BRPM | HEART RATE: 47 BPM | DIASTOLIC BLOOD PRESSURE: 75 MMHG | OXYGEN SATURATION: 98 % | BODY MASS INDEX: 30.26 KG/M2 | TEMPERATURE: 97 F | HEIGHT: 65 IN

## 2022-07-01 DIAGNOSIS — Z12.11 COLON CANCER SCREENING: ICD-10-CM

## 2022-07-01 PROCEDURE — 7100000011 HC PHASE II RECOVERY - ADDTL 15 MIN: Performed by: SURGERY

## 2022-07-01 PROCEDURE — 2500000003 HC RX 250 WO HCPCS: Performed by: NURSE ANESTHETIST, CERTIFIED REGISTERED

## 2022-07-01 PROCEDURE — 88305 TISSUE EXAM BY PATHOLOGIST: CPT

## 2022-07-01 PROCEDURE — 2709999900 HC NON-CHARGEABLE SUPPLY: Performed by: SURGERY

## 2022-07-01 PROCEDURE — 3700000001 HC ADD 15 MINUTES (ANESTHESIA): Performed by: SURGERY

## 2022-07-01 PROCEDURE — 2580000003 HC RX 258: Performed by: ANESTHESIOLOGY

## 2022-07-01 PROCEDURE — 7100000010 HC PHASE II RECOVERY - FIRST 15 MIN: Performed by: SURGERY

## 2022-07-01 PROCEDURE — 6360000002 HC RX W HCPCS: Performed by: NURSE ANESTHETIST, CERTIFIED REGISTERED

## 2022-07-01 PROCEDURE — 3700000000 HC ANESTHESIA ATTENDED CARE: Performed by: SURGERY

## 2022-07-01 PROCEDURE — 3609010300 HC COLONOSCOPY W/BIOPSY SINGLE/MULTIPLE: Performed by: SURGERY

## 2022-07-01 RX ORDER — ONDANSETRON 2 MG/ML
4 INJECTION INTRAMUSCULAR; INTRAVENOUS
Status: DISCONTINUED | OUTPATIENT
Start: 2022-07-01 | End: 2022-07-01 | Stop reason: HOSPADM

## 2022-07-01 RX ORDER — SODIUM CHLORIDE 0.9 % (FLUSH) 0.9 %
5-40 SYRINGE (ML) INJECTION PRN
Status: DISCONTINUED | OUTPATIENT
Start: 2022-07-01 | End: 2022-07-01 | Stop reason: HOSPADM

## 2022-07-01 RX ORDER — LIDOCAINE HYDROCHLORIDE 20 MG/ML
INJECTION, SOLUTION EPIDURAL; INFILTRATION; INTRACAUDAL; PERINEURAL PRN
Status: DISCONTINUED | OUTPATIENT
Start: 2022-07-01 | End: 2022-07-01 | Stop reason: SDUPTHER

## 2022-07-01 RX ORDER — SODIUM CHLORIDE 0.9 % (FLUSH) 0.9 %
5-40 SYRINGE (ML) INJECTION EVERY 12 HOURS SCHEDULED
Status: DISCONTINUED | OUTPATIENT
Start: 2022-07-01 | End: 2022-07-01 | Stop reason: HOSPADM

## 2022-07-01 RX ORDER — SODIUM CHLORIDE 9 MG/ML
INJECTION, SOLUTION INTRAVENOUS CONTINUOUS
Status: DISCONTINUED | OUTPATIENT
Start: 2022-07-01 | End: 2022-07-01

## 2022-07-01 RX ORDER — FENTANYL CITRATE 50 UG/ML
25 INJECTION, SOLUTION INTRAMUSCULAR; INTRAVENOUS EVERY 5 MIN PRN
Status: DISCONTINUED | OUTPATIENT
Start: 2022-07-01 | End: 2022-07-01 | Stop reason: HOSPADM

## 2022-07-01 RX ORDER — SODIUM CHLORIDE, SODIUM LACTATE, POTASSIUM CHLORIDE, CALCIUM CHLORIDE 600; 310; 30; 20 MG/100ML; MG/100ML; MG/100ML; MG/100ML
INJECTION, SOLUTION INTRAVENOUS CONTINUOUS
Status: DISCONTINUED | OUTPATIENT
Start: 2022-07-01 | End: 2022-07-01 | Stop reason: HOSPADM

## 2022-07-01 RX ORDER — LIDOCAINE HYDROCHLORIDE 10 MG/ML
1 INJECTION, SOLUTION EPIDURAL; INFILTRATION; INTRACAUDAL; PERINEURAL
Status: DISCONTINUED | OUTPATIENT
Start: 2022-07-01 | End: 2022-07-01 | Stop reason: HOSPADM

## 2022-07-01 RX ORDER — HYDROMORPHONE HYDROCHLORIDE 1 MG/ML
0.25 INJECTION, SOLUTION INTRAMUSCULAR; INTRAVENOUS; SUBCUTANEOUS EVERY 5 MIN PRN
Status: DISCONTINUED | OUTPATIENT
Start: 2022-07-01 | End: 2022-07-01 | Stop reason: HOSPADM

## 2022-07-01 RX ORDER — SODIUM CHLORIDE 9 MG/ML
INJECTION, SOLUTION INTRAVENOUS PRN
Status: DISCONTINUED | OUTPATIENT
Start: 2022-07-01 | End: 2022-07-01 | Stop reason: HOSPADM

## 2022-07-01 RX ORDER — PROPOFOL 10 MG/ML
INJECTION, EMULSION INTRAVENOUS PRN
Status: DISCONTINUED | OUTPATIENT
Start: 2022-07-01 | End: 2022-07-01 | Stop reason: SDUPTHER

## 2022-07-01 RX ADMIN — PROPOFOL 50 MG: 10 INJECTION, EMULSION INTRAVENOUS at 13:09

## 2022-07-01 RX ADMIN — SODIUM CHLORIDE, POTASSIUM CHLORIDE, SODIUM LACTATE AND CALCIUM CHLORIDE: 600; 310; 30; 20 INJECTION, SOLUTION INTRAVENOUS at 12:19

## 2022-07-01 RX ADMIN — PROPOFOL 50 MG: 10 INJECTION, EMULSION INTRAVENOUS at 13:27

## 2022-07-01 RX ADMIN — PROPOFOL 100 MG: 10 INJECTION, EMULSION INTRAVENOUS at 13:05

## 2022-07-01 RX ADMIN — PROPOFOL 50 MG: 10 INJECTION, EMULSION INTRAVENOUS at 13:32

## 2022-07-01 RX ADMIN — PROPOFOL 50 MG: 10 INJECTION, EMULSION INTRAVENOUS at 13:17

## 2022-07-01 RX ADMIN — PROPOFOL 50 MG: 10 INJECTION, EMULSION INTRAVENOUS at 13:13

## 2022-07-01 RX ADMIN — LIDOCAINE HYDROCHLORIDE 60 MG: 20 INJECTION, SOLUTION EPIDURAL; INFILTRATION; INTRACAUDAL; PERINEURAL at 13:05

## 2022-07-01 RX ADMIN — PROPOFOL 50 MG: 10 INJECTION, EMULSION INTRAVENOUS at 13:21

## 2022-07-01 ASSESSMENT — PAIN - FUNCTIONAL ASSESSMENT
PAIN_FUNCTIONAL_ASSESSMENT: 0-10
PAIN_FUNCTIONAL_ASSESSMENT: 0-10

## 2022-07-01 ASSESSMENT — PAIN DESCRIPTION - DESCRIPTORS: DESCRIPTORS: SORE

## 2022-07-01 NOTE — ANESTHESIA PRE PROCEDURE
MESENTERIC DEFECT performed by Franca Ruiz DO at [facility] ([facility])      PARTIAL HYSTERECTOMY (CERVIX NOT REMOVED)      BRIAN-EN-Y GASTRIC BYPASS N/A 04/16/2019    XI ROBOTIC LAPAROSCOPIC GASTRIC BYPASS BRIAN-EN-Y, LIVER BIOPSY, EGD - GI SCHEDULED performed by Franca Ruiz DO at [address] N/A 11/23/2018    EGD BIOPSY performed by Franca Ruiz DO at [facility]       Social History:    Social History     Tobacco Use    Smoking status: Never Smoker    Smokeless tobacco: Never Used   Substance Use Topics    Alcohol use: Yes                                Counseling given: Not Answered      Vital Signs (Current): There were no vitals filed for this visit.                                            BP Readings from Last 3 Encounters:   06/15/22 110/70   06/14/22 112/71   06/08/22 112/65       NPO Status:                                                                                 BMI:   Wt Readings from Last 3 Encounters:   06/15/22 189 lb (85.7 kg)   06/14/22 190 lb (86.2 kg)   06/06/22 182 lb (82.6 kg)     There is no height or weight on file to calculate BMI.    CBC:   Lab Results   Component Value Date/Time    WBC 8.5 06/08/2022 06:02 AM    RBC 4.27 06/08/2022 06:02 AM    HGB 12.9 06/08/2022 06:02 AM    HCT 37.8 06/08/2022 06:02 AM    MCV 88.5 06/08/2022 06:02 AM    RDW 11.8 06/08/2022 06:02 AM     06/08/2022 06:02 AM       CMP:   Lab Results   Component Value Date/Time     06/08/2022 06:02 AM    K 4.1 06/08/2022 06:02 AM     06/08/2022 06:02 AM    CO2 26 06/08/2022 06:02 AM    BUN 9 06/08/2022 06:02 AM    CREATININE 0.45 06/08/2022 06:02 AM    GFRAA >60 06/08/2022 06:02 AM    LABGLOM >60 06/08/2022 06:02 AM    GLUCOSE 120 06/08/2022 06:02 AM    PROT 6.6 06/08/2022 06:02 AM    CALCIUM 9.5 06/08/2022 06:02 AM    BILITOT 0.85 06/08/2022 06:02 AM    ALKPHOS 90 06/08/2022 06:02 AM    AST 51 06/08/2022 06:02 AM    ALT 51 06/08/2022 06:02 AM       POC Tests: No results for input(s): POCGLU, POCNA, POCK, POCCL, POCBUN, POCHEMO, POCHCT in the last 72 hours. Coags:   Lab Results   Component Value Date/Time    PROTIME 9.5 04/02/2019 08:55 AM    INR 0.9 04/02/2019 08:55 AM    APTT 23.9 04/02/2019 08:55 AM       HCG (If Applicable): No results found for: PREGTESTUR, PREGSERUM, HCG, HCGQUANT     ABGs: No results found for: PHART, PO2ART, UWT7THB, DSL6BFH, BEART, T8CSGTAX     Type & Screen (If Applicable):  No results found for: LABABO, LABRH    Drug/Infectious Status (If Applicable):  No results found for: HIV, HEPCAB    COVID-19 Screening (If Applicable): No results found for: COVID19        Anesthesia Evaluation   no history of anesthetic complications:   Airway: Mallampati: II  TM distance: >3 FB   Neck ROM: full     Dental:          Pulmonary:normal exam    (+) sleep apnea:                             Cardiovascular:  Exercise tolerance: good (>4 METS),       (-)  angina                Neuro/Psych:   (+) headaches:, psychiatric history:            GI/Hepatic/Renal: Neg GI/Hepatic/Renal ROS            Endo/Other: Negative Endo/Other ROS                    Abdominal:             Vascular: Other Findings:           Anesthesia Plan      general and TIVA     ASA 2       Induction: intravenous. MIPS: prophylactic pharmacologic antiemetic agents not administered perioperatively for documented reasons. Anesthetic plan and risks discussed with patient. Plan discussed with CRNA.     Attending anesthesiologist reviewed and agrees with Mamta Love MD   7/1/2022

## 2022-07-01 NOTE — BRIEF OP NOTE
Brief Postoperative Note      Patient: Grabiel Chan  YOB: 1980  MRN: [id number]    Date of Procedure: 7/1/2022    Pre-Op Diagnosis: SCREENING FOR COLON CA    Post-Op Diagnosis: fair prep, hemorrhoids       Procedure(s):  COLONOSCOPY WITH BIOPSY    Surgeon(s):  Dane Edward MD    Assistant:  * No surgical staff found *    Anesthesia: Monitor Anesthesia Care    Estimated Blood Loss (mL): Minimal    Complications: None    Specimens:   ID Type Source Tests Collected by Time Destination   A : 1097 Lucita Snyder MD 7/1/2022 1321        Implants:  * No implants in log *      Drains: * No LDAs found *    Findings: fair prep, hemorrhoids    Electronically signed by Dane Edward MD on 7/1/2022 at 1:50 PM

## 2022-07-01 NOTE — OP NOTE
[address] [facility] [address]                [address] [place] [address]                                OPERATIVE REPORT    PATIENT NAME: Carnell Skiff           :        1980  MED REC NO:   [id number]                             ROOM:  ACCOUNT NO:   [de-identified]                           ADMIT DATE: 2022  PROVIDER:     Payton Mancuso    DATE OF PROCEDURE:  2022    PREPROCEDURE DIAGNOSES:  Screening colonoscopy and the patient with  strong family history for colon cancer and complaining of constipation. ENDOSCOPIST:  Dr. Aryan Reveles. PROCEDURE PERFORMED:  Colonoscopy to the cecum with random cecal  biopsies. POSTPROCEDURE DIAGNOSES:  Fair prep, hemorrhoids. DESCRIPTION OF PROCEDURE AND FINDINGS:  The patient was brought to the  endoscopy suite, was connected to monitoring of blood pressure, oximetry  and EKG for continuous monitoring. She was placed in left lateral  decubitus position and given propofol for deep sedation. We were able  pass without difficulty A flexible variable stiffness scope through anus  to her cecum, which was identified by the appendix and ileocecal valve. Random biopsies were taken from the cecum. Careful withdrawal of the  scope showed no evidence for neoplastic abnormalities. No major  inflammatory abnormalities. There was some fair amount of liquid and  some solid stool still in the colon, which prevented a meticulous  evaluation of the mucosal lining; however, there were no gross lesions  neoplastic nor inflammatory abnormalities that could be identified. Retroflexion of the scope in the rectum showed hemorrhoids in the  anorectal junction. The patient generally tolerated the procedure well. Blood loss was negligible. At the conclusion of the procedure, she was  taken to the recovery area in satisfactory condition.         Denise Montoya    D: 2022 13:56:12       T: 2022 14:00:30 [id number]  Job#: [id number]     Doc#: [id number]    CC:  Armando Marks

## 2022-07-01 NOTE — ANESTHESIA POSTPROCEDURE EVALUATION
Department of Anesthesiology  Postprocedure Note    Patient: Crystal Romero  MRN: [id number]  YOB: 1980  Date of evaluation: 7/1/2022      Procedure Summary     Date: 07/01/22 Room / Location: [address]    Anesthesia Start: 1027 Anesthesia Stop: 6149    Procedure: COLONOSCOPY WITH BIOPSY (N/A ) Diagnosis:       Colon cancer screening      (SCREENING FOR COLON CA)    Surgeons: Lizzie Betancourt MD Responsible Provider: Case Serrano MD    Anesthesia Type: general, TIVA ASA Status: 2          Anesthesia Type: No value filed.     Bambi Phase I:      Bambi Phase II: Bambi Score: 8      Anesthesia Post Evaluation    Patient location during evaluation: PACU  Patient participation: complete - patient participated  Level of consciousness: awake and alert  Airway patency: patent  Nausea & Vomiting: no nausea and no vomiting  Complications: no  Cardiovascular status: hemodynamically stable  Respiratory status: acceptable  Hydration status: stable

## 2022-07-06 LAB — SURGICAL PATHOLOGY REPORT: NORMAL

## 2022-12-19 ENCOUNTER — TELEPHONE (OUTPATIENT)
Dept: OBGYN | Age: 42
End: 2022-12-19

## 2022-12-19 NOTE — TELEPHONE ENCOUNTER
Patient no showed for 12/16/22 appointment at [address] office. Writer left a voice message for patient to call the office to reschedule ultrasound appointment. Patient is rescheduled at [facility].

## 2023-12-30 ENCOUNTER — HOSPITAL ENCOUNTER (EMERGENCY)
Age: 43
Discharge: ANOTHER HOSPITAL | DRG: 326 | End: 2023-12-30
Attending: EMERGENCY MEDICINE
Payer: COMMERCIAL

## 2023-12-30 ENCOUNTER — APPOINTMENT (OUTPATIENT)
Dept: CT IMAGING | Age: 43
DRG: 326 | End: 2023-12-30
Payer: COMMERCIAL

## 2023-12-30 ENCOUNTER — ANESTHESIA EVENT (OUTPATIENT)
Dept: OPERATING ROOM | Age: 43
DRG: 326 | End: 2023-12-30
Payer: COMMERCIAL

## 2023-12-30 ENCOUNTER — ANESTHESIA (OUTPATIENT)
Dept: OPERATING ROOM | Age: 43
DRG: 326 | End: 2023-12-30
Payer: COMMERCIAL

## 2023-12-30 ENCOUNTER — HOSPITAL ENCOUNTER (INPATIENT)
Age: 43
LOS: 3 days | Discharge: HOME | DRG: 326 | End: 2024-01-02
Attending: EMERGENCY MEDICINE | Admitting: SURGERY
Payer: COMMERCIAL

## 2023-12-30 VITALS
HEIGHT: 65 IN | WEIGHT: 178.57 LBS | DIASTOLIC BLOOD PRESSURE: 68 MMHG | RESPIRATION RATE: 20 BRPM | OXYGEN SATURATION: 100 % | TEMPERATURE: 97.8 F | HEART RATE: 91 BPM | BODY MASS INDEX: 29.75 KG/M2 | SYSTOLIC BLOOD PRESSURE: 104 MMHG

## 2023-12-30 DIAGNOSIS — K28.5 CHRONIC MARGINAL ULCER WITH PERFORATION (HCC): Primary | ICD-10-CM

## 2023-12-30 DIAGNOSIS — K92.1 MELANOTIC STOOLS: ICD-10-CM

## 2023-12-30 DIAGNOSIS — K28.5 GASTROJEJUNAL ULCER WITH PERFORATION (HCC): ICD-10-CM

## 2023-12-30 DIAGNOSIS — K25.1 ACUTE GASTRIC ULCER WITH PERFORATION (HCC): Primary | ICD-10-CM

## 2023-12-30 DIAGNOSIS — R10.12 ABDOMINAL PAIN, LEFT UPPER QUADRANT: ICD-10-CM

## 2023-12-30 PROBLEM — K28.3 ACUTE MARGINAL ULCER: Status: ACTIVE | Noted: 2023-12-30

## 2023-12-30 LAB
ALBUMIN SERPL-MCNC: 4.2 G/DL (ref 3.5–5.2)
ALBUMIN/GLOB SERPL: 1.6 {RATIO} (ref 1–2.5)
ALP SERPL-CCNC: 57 U/L (ref 35–104)
ALT SERPL-CCNC: 18 U/L (ref 5–33)
ANION GAP SERPL CALCULATED.3IONS-SCNC: 9 MMOL/L (ref 9–17)
AST SERPL-CCNC: 19 U/L
BASOPHILS # BLD: 0 K/UL (ref 0–0.2)
BASOPHILS NFR BLD: 0 % (ref 0–2)
BILIRUB DIRECT SERPL-MCNC: 0.2 MG/DL
BILIRUB INDIRECT SERPL-MCNC: 0.6 MG/DL (ref 0–1)
BILIRUB SERPL-MCNC: 0.8 MG/DL (ref 0.3–1.2)
BILIRUB UR QL STRIP: NEGATIVE
BUN SERPL-MCNC: 13 MG/DL (ref 6–20)
CALCIUM SERPL-MCNC: 9.2 MG/DL (ref 8.6–10.4)
CHLORIDE SERPL-SCNC: 103 MMOL/L (ref 98–107)
CLARITY UR: CLEAR
CO2 SERPL-SCNC: 25 MMOL/L (ref 20–31)
COLOR UR: YELLOW
COMMENT: NORMAL
CREAT SERPL-MCNC: 0.5 MG/DL (ref 0.5–0.9)
EOSINOPHIL # BLD: 0 K/UL (ref 0–0.4)
EOSINOPHILS RELATIVE PERCENT: 0 % (ref 1–4)
ERYTHROCYTE [DISTWIDTH] IN BLOOD BY AUTOMATED COUNT: 12.4 % (ref 12.5–15.4)
GFR SERPL CREATININE-BSD FRML MDRD: >60 ML/MIN/1.73M2
GLUCOSE SERPL-MCNC: 117 MG/DL (ref 70–99)
GLUCOSE UR STRIP-MCNC: NEGATIVE MG/DL
HCT VFR BLD AUTO: 30.8 % (ref 36–46)
HGB BLD-MCNC: 10.2 G/DL (ref 12–16)
HGB UR QL STRIP.AUTO: NEGATIVE
KETONES UR STRIP-MCNC: NEGATIVE MG/DL
LACTATE BLDV-SCNC: 0.8 MMOL/L (ref 0.5–2.2)
LEUKOCYTE ESTERASE UR QL STRIP: NEGATIVE
LIPASE SERPL-CCNC: 29 U/L (ref 13–60)
LYMPHOCYTES NFR BLD: 1.1 K/UL (ref 1–4.8)
LYMPHOCYTES RELATIVE PERCENT: 15 % (ref 24–44)
MCH RBC QN AUTO: 28 PG (ref 26–34)
MCHC RBC AUTO-ENTMCNC: 33.3 G/DL (ref 31–37)
MCV RBC AUTO: 84.2 FL (ref 80–100)
MONOCYTES NFR BLD: 0.3 K/UL (ref 0.1–1.2)
MONOCYTES NFR BLD: 4 % (ref 2–11)
NEUTROPHILS NFR BLD: 81 % (ref 36–66)
NEUTS SEG NFR BLD: 6 K/UL (ref 1.8–7.7)
NITRITE UR QL STRIP: NEGATIVE
PH UR STRIP: 7 [PH] (ref 5–8)
PLATELET # BLD AUTO: 271 K/UL (ref 140–450)
PMV BLD AUTO: 7.6 FL (ref 6–12)
POTASSIUM SERPL-SCNC: 3.8 MMOL/L (ref 3.7–5.3)
PROT SERPL-MCNC: 6.9 G/DL (ref 6.4–8.3)
PROT UR STRIP-MCNC: NEGATIVE MG/DL
RBC # BLD AUTO: 3.65 M/UL (ref 4–5.2)
SODIUM SERPL-SCNC: 137 MMOL/L (ref 135–144)
SP GR UR STRIP: 1.01 (ref 1–1.03)
UROBILINOGEN UR STRIP-ACNC: NORMAL EU/DL (ref 0–1)
WBC OTHER # BLD: 7.4 K/UL (ref 3.5–11)

## 2023-12-30 PROCEDURE — 3600000009 HC SURGERY ROBOT BASE: Performed by: SURGERY

## 2023-12-30 PROCEDURE — 6360000002 HC RX W HCPCS: Performed by: NURSE PRACTITIONER

## 2023-12-30 PROCEDURE — 6360000002 HC RX W HCPCS: Performed by: STUDENT IN AN ORGANIZED HEALTH CARE EDUCATION/TRAINING PROGRAM

## 2023-12-30 PROCEDURE — 6360000002 HC RX W HCPCS: Performed by: NURSE ANESTHETIST, CERTIFIED REGISTERED

## 2023-12-30 PROCEDURE — 99223 1ST HOSP IP/OBS HIGH 75: CPT | Performed by: SURGERY

## 2023-12-30 PROCEDURE — 0DJ08ZZ INSPECTION OF UPPER INTESTINAL TRACT, VIA NATURAL OR ARTIFICIAL OPENING ENDOSCOPIC: ICD-10-PCS | Performed by: SURGERY

## 2023-12-30 PROCEDURE — 3600000019 HC SURGERY ROBOT ADDTL 15MIN: Performed by: SURGERY

## 2023-12-30 PROCEDURE — 2500000003 HC RX 250 WO HCPCS: Performed by: NURSE ANESTHETIST, CERTIFIED REGISTERED

## 2023-12-30 PROCEDURE — 3700000000 HC ANESTHESIA ATTENDED CARE: Performed by: SURGERY

## 2023-12-30 PROCEDURE — A4216 STERILE WATER/SALINE, 10 ML: HCPCS | Performed by: NURSE PRACTITIONER

## 2023-12-30 PROCEDURE — 2580000003 HC RX 258: Performed by: EMERGENCY MEDICINE

## 2023-12-30 PROCEDURE — 2580000003 HC RX 258: Performed by: STUDENT IN AN ORGANIZED HEALTH CARE EDUCATION/TRAINING PROGRAM

## 2023-12-30 PROCEDURE — 83605 ASSAY OF LACTIC ACID: CPT

## 2023-12-30 PROCEDURE — 74177 CT ABD & PELVIS W/CONTRAST: CPT

## 2023-12-30 PROCEDURE — 8E0W3CZ ROBOTIC ASSISTED PROCEDURE OF TRUNK REGION, PERCUTANEOUS APPROACH: ICD-10-PCS | Performed by: SURGERY

## 2023-12-30 PROCEDURE — 99285 EMERGENCY DEPT VISIT HI MDM: CPT

## 2023-12-30 PROCEDURE — C9113 INJ PANTOPRAZOLE SODIUM, VIA: HCPCS | Performed by: NURSE PRACTITIONER

## 2023-12-30 PROCEDURE — 7100000000 HC PACU RECOVERY - FIRST 15 MIN: Performed by: SURGERY

## 2023-12-30 PROCEDURE — 80076 HEPATIC FUNCTION PANEL: CPT

## 2023-12-30 PROCEDURE — 7100000001 HC PACU RECOVERY - ADDTL 15 MIN: Performed by: SURGERY

## 2023-12-30 PROCEDURE — 6360000002 HC RX W HCPCS: Performed by: EMERGENCY MEDICINE

## 2023-12-30 PROCEDURE — 80048 BASIC METABOLIC PNL TOTAL CA: CPT

## 2023-12-30 PROCEDURE — 6360000004 HC RX CONTRAST MEDICATION: Performed by: NURSE PRACTITIONER

## 2023-12-30 PROCEDURE — 96374 THER/PROPH/DIAG INJ IV PUSH: CPT

## 2023-12-30 PROCEDURE — 2580000003 HC RX 258: Performed by: SURGERY

## 2023-12-30 PROCEDURE — 1200000000 HC SEMI PRIVATE

## 2023-12-30 PROCEDURE — 0DQ64ZZ REPAIR STOMACH, PERCUTANEOUS ENDOSCOPIC APPROACH: ICD-10-PCS | Performed by: SURGERY

## 2023-12-30 PROCEDURE — 3700000001 HC ADD 15 MINUTES (ANESTHESIA): Performed by: SURGERY

## 2023-12-30 PROCEDURE — 6360000004 HC RX CONTRAST MEDICATION: Performed by: EMERGENCY MEDICINE

## 2023-12-30 PROCEDURE — 2709999900 HC NON-CHARGEABLE SUPPLY: Performed by: SURGERY

## 2023-12-30 PROCEDURE — 85025 COMPLETE CBC W/AUTO DIFF WBC: CPT

## 2023-12-30 PROCEDURE — S2900 ROBOTIC SURGICAL SYSTEM: HCPCS | Performed by: SURGERY

## 2023-12-30 PROCEDURE — 2580000003 HC RX 258: Performed by: NURSE PRACTITIONER

## 2023-12-30 PROCEDURE — 83690 ASSAY OF LIPASE: CPT

## 2023-12-30 PROCEDURE — 36415 COLL VENOUS BLD VENIPUNCTURE: CPT

## 2023-12-30 PROCEDURE — 2720000010 HC SURG SUPPLY STERILE: Performed by: SURGERY

## 2023-12-30 PROCEDURE — 81003 URINALYSIS AUTO W/O SCOPE: CPT

## 2023-12-30 PROCEDURE — 87040 BLOOD CULTURE FOR BACTERIA: CPT

## 2023-12-30 PROCEDURE — 96375 TX/PRO/DX INJ NEW DRUG ADDON: CPT

## 2023-12-30 RX ORDER — ONDANSETRON 2 MG/ML
4 INJECTION INTRAMUSCULAR; INTRAVENOUS ONCE
Status: COMPLETED | OUTPATIENT
Start: 2023-12-30 | End: 2023-12-30

## 2023-12-30 RX ORDER — ONDANSETRON 4 MG/1
4 TABLET, ORALLY DISINTEGRATING ORAL EVERY 8 HOURS PRN
Status: DISCONTINUED | OUTPATIENT
Start: 2023-12-30 | End: 2023-12-31 | Stop reason: SDUPTHER

## 2023-12-30 RX ORDER — SODIUM CHLORIDE 0.9 % (FLUSH) 0.9 %
5-40 SYRINGE (ML) INJECTION PRN
Status: DISCONTINUED | OUTPATIENT
Start: 2023-12-30 | End: 2024-01-02 | Stop reason: HOSPADM

## 2023-12-30 RX ORDER — M-VIT,TX,IRON,MINS/CALC/FOLIC 27MG-0.4MG
1 TABLET ORAL DAILY
COMMUNITY
End: 2023-12-30

## 2023-12-30 RX ORDER — MORPHINE SULFATE 4 MG/ML
4 INJECTION, SOLUTION INTRAMUSCULAR; INTRAVENOUS ONCE
Status: COMPLETED | OUTPATIENT
Start: 2023-12-30 | End: 2023-12-30

## 2023-12-30 RX ORDER — CALCIUM CARBONATE 500 MG/1
1 TABLET, CHEWABLE ORAL DAILY
COMMUNITY
End: 2023-12-30

## 2023-12-30 RX ORDER — MAGNESIUM HYDROXIDE 1200 MG/15ML
LIQUID ORAL CONTINUOUS PRN
Status: DISCONTINUED | OUTPATIENT
Start: 2023-12-30 | End: 2023-12-31 | Stop reason: HOSPADM

## 2023-12-30 RX ORDER — POTASSIUM CHLORIDE 7.45 MG/ML
10 INJECTION INTRAVENOUS PRN
Status: DISCONTINUED | OUTPATIENT
Start: 2023-12-30 | End: 2024-01-02 | Stop reason: HOSPADM

## 2023-12-30 RX ORDER — SODIUM CHLORIDE 0.9 % (FLUSH) 0.9 %
10 SYRINGE (ML) INJECTION PRN
Status: DISCONTINUED | OUTPATIENT
Start: 2023-12-30 | End: 2023-12-30 | Stop reason: HOSPADM

## 2023-12-30 RX ORDER — 0.9 % SODIUM CHLORIDE 0.9 %
1000 INTRAVENOUS SOLUTION INTRAVENOUS ONCE
Status: COMPLETED | OUTPATIENT
Start: 2023-12-30 | End: 2023-12-30

## 2023-12-30 RX ORDER — 0.9 % SODIUM CHLORIDE 0.9 %
80 INTRAVENOUS SOLUTION INTRAVENOUS ONCE
Status: DISCONTINUED | OUTPATIENT
Start: 2023-12-30 | End: 2023-12-30

## 2023-12-30 RX ORDER — POLYETHYLENE GLYCOL 3350 17 G/17G
17 POWDER, FOR SOLUTION ORAL DAILY
Status: DISCONTINUED | OUTPATIENT
Start: 2023-12-30 | End: 2023-12-31

## 2023-12-30 RX ORDER — METRONIDAZOLE 500 MG/100ML
500 INJECTION, SOLUTION INTRAVENOUS ONCE
Status: DISCONTINUED | OUTPATIENT
Start: 2023-12-30 | End: 2023-12-30

## 2023-12-30 RX ORDER — FENTANYL CITRATE 50 UG/ML
50 INJECTION, SOLUTION INTRAMUSCULAR; INTRAVENOUS
Status: DISCONTINUED | OUTPATIENT
Start: 2023-12-30 | End: 2023-12-31

## 2023-12-30 RX ORDER — ONDANSETRON 2 MG/ML
4 INJECTION INTRAMUSCULAR; INTRAVENOUS EVERY 6 HOURS PRN
Status: DISCONTINUED | OUTPATIENT
Start: 2023-12-30 | End: 2023-12-31 | Stop reason: SDUPTHER

## 2023-12-30 RX ORDER — SODIUM CHLORIDE 0.9 % (FLUSH) 0.9 %
5-40 SYRINGE (ML) INJECTION EVERY 12 HOURS SCHEDULED
Status: DISCONTINUED | OUTPATIENT
Start: 2023-12-30 | End: 2024-01-02 | Stop reason: HOSPADM

## 2023-12-30 RX ORDER — DOCUSATE SODIUM 100 MG/1
100 CAPSULE, LIQUID FILLED ORAL 2 TIMES DAILY
COMMUNITY

## 2023-12-30 RX ORDER — SODIUM CHLORIDE 9 MG/ML
INJECTION, SOLUTION INTRAVENOUS PRN
Status: DISCONTINUED | OUTPATIENT
Start: 2023-12-30 | End: 2024-01-02 | Stop reason: HOSPADM

## 2023-12-30 RX ORDER — SODIUM CHLORIDE, SODIUM LACTATE, POTASSIUM CHLORIDE, CALCIUM CHLORIDE 600; 310; 30; 20 MG/100ML; MG/100ML; MG/100ML; MG/100ML
INJECTION, SOLUTION INTRAVENOUS CONTINUOUS
Status: DISCONTINUED | OUTPATIENT
Start: 2023-12-30 | End: 2024-01-01

## 2023-12-30 RX ORDER — POTASSIUM CHLORIDE 29.8 MG/ML
20 INJECTION INTRAVENOUS PRN
Status: DISCONTINUED | OUTPATIENT
Start: 2023-12-30 | End: 2024-01-02 | Stop reason: HOSPADM

## 2023-12-30 RX ORDER — MAGNESIUM SULFATE IN WATER 40 MG/ML
2000 INJECTION, SOLUTION INTRAVENOUS PRN
Status: DISCONTINUED | OUTPATIENT
Start: 2023-12-30 | End: 2024-01-02 | Stop reason: HOSPADM

## 2023-12-30 RX ORDER — MORPHINE SULFATE 4 MG/ML
4 INJECTION INTRAVENOUS ONCE
Status: COMPLETED | OUTPATIENT
Start: 2023-12-30 | End: 2023-12-30

## 2023-12-30 RX ADMIN — Medication 80 ML: at 14:34

## 2023-12-30 RX ADMIN — PIPERACILLIN AND TAZOBACTAM 4500 MG: 4; .5 INJECTION, POWDER, FOR SOLUTION INTRAVENOUS at 16:05

## 2023-12-30 RX ADMIN — MORPHINE SULFATE 4 MG: 4 INJECTION INTRAVENOUS at 18:19

## 2023-12-30 RX ADMIN — LIDOCAINE HYDROCHLORIDE 50 MG: 10 INJECTION, SOLUTION EPIDURAL; INFILTRATION; INTRACAUDAL; PERINEURAL at 23:52

## 2023-12-30 RX ADMIN — FENTANYL CITRATE 50 MCG: 50 INJECTION, SOLUTION INTRAMUSCULAR; INTRAVENOUS at 21:08

## 2023-12-30 RX ADMIN — IOPAMIDOL 75 ML: 755 INJECTION, SOLUTION INTRAVENOUS at 14:33

## 2023-12-30 RX ADMIN — SODIUM CHLORIDE 1000 ML: 9 INJECTION, SOLUTION INTRAVENOUS at 18:19

## 2023-12-30 RX ADMIN — MIDAZOLAM 2 MG: 1 INJECTION INTRAMUSCULAR; INTRAVENOUS at 23:48

## 2023-12-30 RX ADMIN — IOPAMIDOL 75 ML: 755 INJECTION, SOLUTION INTRAVENOUS at 20:20

## 2023-12-30 RX ADMIN — ONDANSETRON 4 MG: 2 INJECTION INTRAMUSCULAR; INTRAVENOUS at 21:06

## 2023-12-30 RX ADMIN — SODIUM CHLORIDE, POTASSIUM CHLORIDE, SODIUM LACTATE AND CALCIUM CHLORIDE: 600; 310; 30; 20 INJECTION, SOLUTION INTRAVENOUS at 23:47

## 2023-12-30 RX ADMIN — PROPOFOL 200 MG: 10 INJECTION, EMULSION INTRAVENOUS at 23:52

## 2023-12-30 RX ADMIN — MORPHINE SULFATE 4 MG: 4 INJECTION INTRAVENOUS at 15:53

## 2023-12-30 RX ADMIN — SODIUM CHLORIDE, POTASSIUM CHLORIDE, SODIUM LACTATE AND CALCIUM CHLORIDE: 600; 310; 30; 20 INJECTION, SOLUTION INTRAVENOUS at 21:26

## 2023-12-30 RX ADMIN — Medication 200 MG: at 23:52

## 2023-12-30 RX ADMIN — PANTOPRAZOLE SODIUM 40 MG: 40 INJECTION, POWDER, FOR SOLUTION INTRAVENOUS at 15:57

## 2023-12-30 RX ADMIN — ONDANSETRON 4 MG: 2 INJECTION INTRAMUSCULAR; INTRAVENOUS at 15:52

## 2023-12-30 RX ADMIN — SODIUM CHLORIDE, PRESERVATIVE FREE 10 ML: 5 INJECTION INTRAVENOUS at 14:33

## 2023-12-30 RX ADMIN — ONDANSETRON 4 MG: 2 INJECTION INTRAMUSCULAR; INTRAVENOUS at 18:19

## 2023-12-30 RX ADMIN — SODIUM CHLORIDE 1000 ML: 9 INJECTION, SOLUTION INTRAVENOUS at 15:43

## 2023-12-30 RX ADMIN — FENTANYL CITRATE 100 MCG: 50 INJECTION, SOLUTION INTRAMUSCULAR; INTRAVENOUS at 23:52

## 2023-12-30 ASSESSMENT — PAIN DESCRIPTION - LOCATION
LOCATION: ABDOMEN
LOCATION: ABDOMEN
LOCATION: HEAD;ABDOMEN

## 2023-12-30 ASSESSMENT — PAIN SCALES - GENERAL
PAINLEVEL_OUTOF10: 8
PAINLEVEL_OUTOF10: 5
PAINLEVEL_OUTOF10: 6

## 2023-12-30 ASSESSMENT — PAIN DESCRIPTION - ORIENTATION
ORIENTATION: UPPER
ORIENTATION: LEFT;RIGHT;MID;UPPER
ORIENTATION: LEFT;UPPER

## 2023-12-30 ASSESSMENT — PAIN DESCRIPTION - PAIN TYPE: TYPE: ACUTE PAIN

## 2023-12-30 ASSESSMENT — PAIN DESCRIPTION - DESCRIPTORS: DESCRIPTORS: SHARP

## 2023-12-30 ASSESSMENT — PAIN - FUNCTIONAL ASSESSMENT: PAIN_FUNCTIONAL_ASSESSMENT: 0-10

## 2023-12-30 NOTE — ED PROVIDER NOTES
[facility] ED     Emergency Department     Faculty Attestation        I performed a history and physical examination of the patient and discussed management with the resident. I reviewed the resident’s note and agree with the documented findings and plan of care. Any areas of disagreement are noted on the chart. I was personally present for the key portions of any procedures. I have documented in the chart those procedures where I was not present during the key portions. I have reviewed the emergency nurses triage note. I agree with the chief complaint, past medical history, past surgical history, allergies, medications, social and family history as documented unless otherwise noted below.    For mid-level providers such as nurse practitioners as well as physicians assistants:    I have personally seen and evaluated the patient.    I find the patient's history and physical exam are consistent with NP/PA documentation.  I agree with the care provided, treatment rendered, disposition, & follow-up plan.     Additional findings are as noted.    Vital Signs: /66   Pulse 84   Temp 98.2 °F (36.8 °C) (Oral)   Resp 16   SpO2 100%   PCP:  Germain Aceves MD    Pertinent Comments:           Critical Care  None          Tyler Szymanski MD    Attending Emergency Medicine Physician            Willy Szymanski MD  12/30/23 [id number]

## 2023-12-30 NOTE — ED PROVIDER NOTES
Number of children: Not on file    Years of education: Not on file    Highest education level: Not on file   Occupational History    Not on file   Tobacco Use    Smoking status: Never    Smokeless tobacco: Never   Vaping Use    Vaping Use: Never used   Substance and Sexual Activity    Alcohol use: Yes     Comment: Social    Drug use: Yes     Types: Marijuana (Weed)     Comment: Rare    Sexual activity: Not on file   Other Topics Concern    Not on file   Social History Narrative    Not on file     Social Determinants of Health     Financial Resource Strain: Not on file   Food Insecurity: Not on file   Transportation Needs: Not on file   Physical Activity: Not on file   Stress: Not on file   Social Connections: Not on file   Intimate Partner Violence: Not on file   Housing Stability: Not on file       Family History   Problem Relation Age of Onset    Arthritis Mother     Depression Mother     Heart Disease Father     Cancer Father        Allergies:  Patient has no known allergies.    Home Medications:  Prior to Admission medications    Medication Sig Start Date End Date Taking? Authorizing Provider   docusate sodium (COLACE) 100 MG capsule Take 1 capsule by mouth 2 times daily   Yes Provider, Historical, MD         REVIEW OF SYSTEMS       Review of Systems   Constitutional:  Negative for chills and fever.   Respiratory:  Negative for shortness of breath.    Cardiovascular:  Negative for chest pain.   Gastrointestinal:  Positive for abdominal pain, blood in stool and nausea. Negative for diarrhea and vomiting.   Genitourinary:  Negative for dysuria and hematuria.       PHYSICAL EXAM      INITIAL VITALS:   BP (!) 110/56   Pulse 78   Temp 98.2 °F (36.8 °C) (Oral)   Resp 13   SpO2 99%     Physical Exam  Constitutional:       General: She is not in acute distress.  HENT:      Head: Normocephalic and atraumatic.   Eyes:      Conjunctiva/sclera: Conjunctivae normal.   Cardiovascular:      Rate and Rhythm: Normal rate and

## 2023-12-30 NOTE — ED NOTES
43-year-old female with previous gastric bypass surgery with Dr. Merrill with now perforated ulcer.   Accepted by Dr. Santos with Zosyn being given and stable vital signs.   Awaiting arrival  Accepted by Dr Cesar

## 2023-12-30 NOTE — ED PROVIDER NOTES
[facility] EMERGENCY DEPARTMENT  EMERGENCY DEPARTMENT ENCOUNTER      Pt Name: Geoffery Lynne E Schober  MRN: [id number]  Birthdate 1980  Date of evaluation: 12/30/2023  Provider: MATTI Rosa CNP  5:08 PM    CHIEF COMPLAINT       Chief Complaint   Patient presents with    Abdominal Pain     Upper quadrants abdominal pains for couple days.  More severe since last night.  History of gastric bypass 2019,  cholecystectomy 2021         HISTORY OF PRESENT ILLNESS    Geoffery Lynne E Schober is a 43 y.o. female who presents to the emergency department      This is a nontoxic-appearing 43-year-old female presenting to the emergency department with upper abdominal pain, she states that she had some upper abdominal pain about a week ago, she did note that she had some dark tarry stools was not sure if this was related to the iron supplements she is on; she has had previous history of Heaven-en-Y gastric bypass surgery, acute cholecystectomy, partial hysterectomy; the patient states the pain became acutely worse last night she was had a holiday party, states that she does not usually eat a lot of carbs or bread but she did have a few bites of the pizza crust, she states she has done this before without difficulty, since then she has had sharp crampy upper abdominal pain with nausea.  Patient additionally reports that she has had a hard small bowel movements, that she did use a suppository this morning.  The patient's had no fevers with this.  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  Patient was concerned due to the symptoms prompting her to come to the emergency department for evaluation.  Patient is concerned for possible bowel obstruction, gastric bleeding.    The history is provided by the patient and medical records.       Nursing Notes were reviewed.    REVIEW OF SYSTEMS       Review of Systems   Constitutional:  Negative for chills,  fatigue and fever.   HENT:  Negative for congestion and sore throat.    Respiratory:  Negative for cough and shortness of breath.    Cardiovascular:  Negative for chest pain and leg swelling.   Gastrointestinal:  Positive for abdominal pain, blood in stool, constipation and nausea. Negative for diarrhea and vomiting.   Genitourinary:  Negative for dysuria and hematuria.   Musculoskeletal:  Negative for back pain and neck pain.   Skin:  Negative for rash and wound.   Neurological:  Negative for numbness and headaches.   Psychiatric/Behavioral:  Negative for confusion and suicidal ideas.        Except as noted above the remainder of the review of systems was reviewed and negative.       PAST MEDICAL HISTORY     Past Medical History:   Diagnosis Date    Back pain     Depression     Migraines     Sleep apnea          SURGICAL HISTORY       Past Surgical History:   Procedure Laterality Date    CHOLECYSTECTOMY, LAPAROSCOPIC  06/07/2022    CHOLECYSTECTOMY LAPAROSCOPIC ROBOTIC, EGD, CLOSURE OF MESENTERIC DEFECT    CHOLECYSTECTOMY, LAPAROSCOPIC N/A 6/7/2022    CHOLECYSTECTOMY LAPAROSCOPIC ROBOTIC, EGD, CLOSURE OF MESENTERIC DEFECT performed by Anjum Merrill DO at [facility] OR    COLONOSCOPY N/A 7/1/2022    COLONOSCOPY WITH BIOPSY performed by Jim Carroll MD at [facility] OR    HYSTERECTOMY (CERVIX STATUS UNKNOWN)      PARTIAL HYSTERECTOMY (CERVIX NOT REMOVED)      BRIAN-EN-Y GASTRIC BYPASS N/A 04/16/2019    XI ROBOTIC LAPAROSCOPIC GASTRIC BYPASS BRIAN-EN-Y, LIVER BIOPSY, EGD - GI SCHEDULED performed by Anjum Merrill DO at [facility] OR    UPPER GASTROINTESTINAL ENDOSCOPY N/A 11/23/2018    EGD BIOPSY performed by Anjum Merrill DO at [facility]         CURRENT MEDICATIONS       Previous Medications    AMPHETAMINE-DEXTROAMPHETAMINE (ADDERALL XR) 20 MG EXTENDED RELEASE CAPSULE        CALCIUM CARBONATE (TUMS) 500 MG CHEWABLE TABLET    Take 1 tablet by mouth daily    CALCIUM CARBONATE ANTACID (TUMS PO)    Take by mouth     attending physician as well, and I started the [facility] access transfer process to [facility]; the attending physician ordered IV piggyback Zosyn, Protonix drip, IV piggyback Flagyl. Dr. Concha Chilel returned page through [facility] access was updated on the HPI, assessment findings diagnostic test findings, treatment modalities; physician request that patient does not require Protonix drip, or Flagyl these orders were canceled. Dr. Concha Chilel agrees for the patient to be transferred ER to ER, at this time I was then transferred to ER attending Deloris York who was updated on the patient's HPI, diagnostic test findings, treatment modalities conversation with the general surgeon Dr. Chantelle Ortiz is agreeable for the patient to be transferred. Patient was updated and all the telephone conversations, plan for transfer to [facility] she is thankful and agreeable, she is resting comfortably after receiving medications, in no acute distress with physiologic vital signs awaiting transport. Patient's  has since arrived to the hospital as well and is updated on all the diagnostic test findings and plan for transfer to higher level of care. 1708 patient is leaving the emergency department via transport on her way to [facility] emergency department for further evaluation. Amount and/or Complexity of Data Reviewed  Labs: ordered. Radiology: ordered. Risk  Prescription drug management. REASSESSMENT          CRITICAL CARE TIME   Total Critical Care time was  minutes, excluding separately reportable procedures. There was a high probability of clinically significant/life threatening deterioration in the patient's condition which required my urgent intervention. CONSULTS:  None    PROCEDURES:  Unless otherwise noted below, none     Procedures        FINAL IMPRESSION      1.  Acute gastric ulcer with perforation ([address])    2. Abdominal pain, left upper

## 2023-12-31 PROBLEM — K28.5 GASTROJEJUNAL ULCER WITH PERFORATION (HCC): Status: ACTIVE | Noted: 2023-12-31

## 2023-12-31 LAB
ANION GAP SERPL CALCULATED.3IONS-SCNC: 12 MMOL/L (ref 9–17)
ANION GAP SERPL CALCULATED.3IONS-SCNC: 15 MMOL/L (ref 9–17)
BASOPHILS # BLD: 0 K/UL (ref 0–0.2)
BASOPHILS NFR BLD: 0 % (ref 0–2)
BUN SERPL-MCNC: 10 MG/DL (ref 6–20)
BUN SERPL-MCNC: 10 MG/DL (ref 6–20)
CALCIUM SERPL-MCNC: 8.2 MG/DL (ref 8.6–10.4)
CALCIUM SERPL-MCNC: 8.4 MG/DL (ref 8.6–10.4)
CHLORIDE SERPL-SCNC: 105 MMOL/L (ref 98–107)
CHLORIDE SERPL-SCNC: 106 MMOL/L (ref 98–107)
CO2 SERPL-SCNC: 15 MMOL/L (ref 20–31)
CO2 SERPL-SCNC: 19 MMOL/L (ref 20–31)
CREAT SERPL-MCNC: 0.5 MG/DL (ref 0.5–0.9)
CREAT SERPL-MCNC: 0.6 MG/DL (ref 0.5–0.9)
EOSINOPHIL # BLD: 0 K/UL (ref 0–0.4)
EOSINOPHILS RELATIVE PERCENT: 0 % (ref 1–4)
ERYTHROCYTE [DISTWIDTH] IN BLOOD BY AUTOMATED COUNT: 11.9 % (ref 11.8–14.4)
ERYTHROCYTE [DISTWIDTH] IN BLOOD BY AUTOMATED COUNT: 12 % (ref 11.8–14.4)
GFR SERPL CREATININE-BSD FRML MDRD: >60 ML/MIN/1.73M2
GFR SERPL CREATININE-BSD FRML MDRD: >60 ML/MIN/1.73M2
GLUCOSE BLD-MCNC: 102 MG/DL (ref 65–105)
GLUCOSE SERPL-MCNC: 107 MG/DL (ref 70–99)
GLUCOSE SERPL-MCNC: 112 MG/DL (ref 70–99)
HCT VFR BLD AUTO: 30 % (ref 36.3–47.1)
HCT VFR BLD AUTO: 31.6 % (ref 36.3–47.1)
HGB BLD-MCNC: 9.2 G/DL (ref 11.9–15.1)
HGB BLD-MCNC: 9.3 G/DL (ref 11.9–15.1)
IMM GRANULOCYTES # BLD AUTO: 0 K/UL (ref 0–0.3)
IMM GRANULOCYTES NFR BLD: 0 %
LYMPHOCYTES NFR BLD: 0.41 K/UL (ref 1–4.8)
LYMPHOCYTES RELATIVE PERCENT: 5 % (ref 24–44)
MAGNESIUM SERPL-MCNC: 2.1 MG/DL (ref 1.6–2.6)
MCH RBC QN AUTO: 27.8 PG (ref 25.2–33.5)
MCH RBC QN AUTO: 27.9 PG (ref 25.2–33.5)
MCHC RBC AUTO-ENTMCNC: 29.1 G/DL (ref 28.4–34.8)
MCHC RBC AUTO-ENTMCNC: 31 G/DL (ref 28.4–34.8)
MCV RBC AUTO: 90.1 FL (ref 82.6–102.9)
MCV RBC AUTO: 95.5 FL (ref 82.6–102.9)
MICROORGANISM SPEC CULT: NORMAL
MICROORGANISM SPEC CULT: NORMAL
MONOCYTES NFR BLD: 0.16 K/UL (ref 0.1–0.8)
MONOCYTES NFR BLD: 2 % (ref 1–7)
MORPHOLOGY: NORMAL
NEUTROPHILS NFR BLD: 93 % (ref 36–66)
NEUTS SEG NFR BLD: 7.63 K/UL (ref 1.8–7.7)
NRBC BLD-RTO: 0 PER 100 WBC
NRBC BLD-RTO: 0 PER 100 WBC
PLATELET # BLD AUTO: 236 K/UL (ref 138–453)
PLATELET # BLD AUTO: 244 K/UL (ref 138–453)
PMV BLD AUTO: 10.3 FL (ref 8.1–13.5)
PMV BLD AUTO: 10.6 FL (ref 8.1–13.5)
POTASSIUM SERPL-SCNC: 3.8 MMOL/L (ref 3.7–5.3)
POTASSIUM SERPL-SCNC: 3.9 MMOL/L (ref 3.7–5.3)
RBC # BLD AUTO: 3.31 M/UL (ref 3.95–5.11)
RBC # BLD AUTO: 3.33 M/UL (ref 3.95–5.11)
SERVICE CMNT-IMP: NORMAL
SERVICE CMNT-IMP: NORMAL
SODIUM SERPL-SCNC: 136 MMOL/L (ref 135–144)
SODIUM SERPL-SCNC: 136 MMOL/L (ref 135–144)
SPECIMEN DESCRIPTION: NORMAL
SPECIMEN DESCRIPTION: NORMAL
WBC OTHER # BLD: 7.7 K/UL (ref 3.5–11.3)
WBC OTHER # BLD: 8.2 K/UL (ref 3.5–11.3)

## 2023-12-31 PROCEDURE — 6360000002 HC RX W HCPCS: Performed by: STUDENT IN AN ORGANIZED HEALTH CARE EDUCATION/TRAINING PROGRAM

## 2023-12-31 PROCEDURE — 44238 UNLISTED LAPS PX INTESTINE: CPT | Performed by: SURGERY

## 2023-12-31 PROCEDURE — C9113 INJ PANTOPRAZOLE SODIUM, VIA: HCPCS | Performed by: STUDENT IN AN ORGANIZED HEALTH CARE EDUCATION/TRAINING PROGRAM

## 2023-12-31 PROCEDURE — 2580000003 HC RX 258: Performed by: STUDENT IN AN ORGANIZED HEALTH CARE EDUCATION/TRAINING PROGRAM

## 2023-12-31 PROCEDURE — 49905 OMENTAL FLAP INTRA-ABDOM: CPT | Performed by: SURGERY

## 2023-12-31 PROCEDURE — 2500000003 HC RX 250 WO HCPCS: Performed by: SURGERY

## 2023-12-31 PROCEDURE — 94640 AIRWAY INHALATION TREATMENT: CPT

## 2023-12-31 PROCEDURE — 80048 BASIC METABOLIC PNL TOTAL CA: CPT

## 2023-12-31 PROCEDURE — 6360000002 HC RX W HCPCS: Performed by: SURGERY

## 2023-12-31 PROCEDURE — 93005 ELECTROCARDIOGRAM TRACING: CPT | Performed by: STUDENT IN AN ORGANIZED HEALTH CARE EDUCATION/TRAINING PROGRAM

## 2023-12-31 PROCEDURE — 1200000000 HC SEMI PRIVATE

## 2023-12-31 PROCEDURE — 6360000002 HC RX W HCPCS: Performed by: NURSE ANESTHETIST, CERTIFIED REGISTERED

## 2023-12-31 PROCEDURE — 94761 N-INVAS EAR/PLS OXIMETRY MLT: CPT

## 2023-12-31 PROCEDURE — 83735 ASSAY OF MAGNESIUM: CPT

## 2023-12-31 PROCEDURE — 85027 COMPLETE CBC AUTOMATED: CPT

## 2023-12-31 PROCEDURE — 82947 ASSAY GLUCOSE BLOOD QUANT: CPT

## 2023-12-31 PROCEDURE — 2580000003 HC RX 258: Performed by: NURSE ANESTHETIST, CERTIFIED REGISTERED

## 2023-12-31 PROCEDURE — 6370000000 HC RX 637 (ALT 250 FOR IP): Performed by: SURGERY

## 2023-12-31 PROCEDURE — 2500000003 HC RX 250 WO HCPCS: Performed by: NURSE ANESTHETIST, CERTIFIED REGISTERED

## 2023-12-31 PROCEDURE — 2580000003 HC RX 258: Performed by: ANESTHESIOLOGY

## 2023-12-31 PROCEDURE — 2580000003 HC RX 258: Performed by: SURGERY

## 2023-12-31 PROCEDURE — 6360000002 HC RX W HCPCS: Performed by: ANESTHESIOLOGY

## 2023-12-31 PROCEDURE — 36415 COLL VENOUS BLD VENIPUNCTURE: CPT

## 2023-12-31 PROCEDURE — 85025 COMPLETE CBC W/AUTO DIFF WBC: CPT

## 2023-12-31 RX ORDER — SODIUM CHLORIDE 0.9 % (FLUSH) 0.9 %
5-40 SYRINGE (ML) INJECTION EVERY 12 HOURS SCHEDULED
Status: DISCONTINUED | OUTPATIENT
Start: 2023-12-31 | End: 2024-01-02 | Stop reason: HOSPADM

## 2023-12-31 RX ORDER — SODIUM CHLORIDE, SODIUM LACTATE, POTASSIUM CHLORIDE, CALCIUM CHLORIDE 600; 310; 30; 20 MG/100ML; MG/100ML; MG/100ML; MG/100ML
INJECTION, SOLUTION INTRAVENOUS CONTINUOUS
Status: DISCONTINUED | OUTPATIENT
Start: 2023-12-31 | End: 2023-12-31

## 2023-12-31 RX ORDER — MIDAZOLAM HYDROCHLORIDE 1 MG/ML
INJECTION INTRAMUSCULAR; INTRAVENOUS PRN
Status: DISCONTINUED | OUTPATIENT
Start: 2023-12-30 | End: 2023-12-31 | Stop reason: SDUPTHER

## 2023-12-31 RX ORDER — ONDANSETRON 4 MG/1
4 TABLET, ORALLY DISINTEGRATING ORAL EVERY 8 HOURS PRN
Status: DISCONTINUED | OUTPATIENT
Start: 2023-12-31 | End: 2024-01-02 | Stop reason: HOSPADM

## 2023-12-31 RX ORDER — FENTANYL CITRATE 50 UG/ML
25 INJECTION, SOLUTION INTRAMUSCULAR; INTRAVENOUS EVERY 5 MIN PRN
Status: DISCONTINUED | OUTPATIENT
Start: 2023-12-31 | End: 2023-12-31

## 2023-12-31 RX ORDER — DEXAMETHASONE SODIUM PHOSPHATE 10 MG/ML
INJECTION INTRAMUSCULAR; INTRAVENOUS PRN
Status: DISCONTINUED | OUTPATIENT
Start: 2023-12-31 | End: 2023-12-31 | Stop reason: SDUPTHER

## 2023-12-31 RX ORDER — PROPOFOL 10 MG/ML
INJECTION, EMULSION INTRAVENOUS PRN
Status: DISCONTINUED | OUTPATIENT
Start: 2023-12-30 | End: 2023-12-31 | Stop reason: SDUPTHER

## 2023-12-31 RX ORDER — SODIUM CHLORIDE, SODIUM LACTATE, POTASSIUM CHLORIDE, CALCIUM CHLORIDE 600; 310; 30; 20 MG/100ML; MG/100ML; MG/100ML; MG/100ML
INJECTION, SOLUTION INTRAVENOUS CONTINUOUS PRN
Status: DISCONTINUED | OUTPATIENT
Start: 2023-12-30 | End: 2023-12-31 | Stop reason: SDUPTHER

## 2023-12-31 RX ORDER — HEPARIN SODIUM 5000 [USP'U]/ML
5000 INJECTION, SOLUTION INTRAVENOUS; SUBCUTANEOUS EVERY 8 HOURS SCHEDULED
Status: DISCONTINUED | OUTPATIENT
Start: 2023-12-31 | End: 2024-01-02 | Stop reason: HOSPADM

## 2023-12-31 RX ORDER — PROCHLORPERAZINE EDISYLATE 5 MG/ML
5 INJECTION INTRAMUSCULAR; INTRAVENOUS
Status: COMPLETED | OUTPATIENT
Start: 2023-12-31 | End: 2023-12-31

## 2023-12-31 RX ORDER — BUPIVACAINE HYDROCHLORIDE 5 MG/ML
INJECTION, SOLUTION PERINEURAL PRN
Status: DISCONTINUED | OUTPATIENT
Start: 2023-12-31 | End: 2023-12-31 | Stop reason: ALTCHOICE

## 2023-12-31 RX ORDER — LIDOCAINE HYDROCHLORIDE 10 MG/ML
INJECTION, SOLUTION EPIDURAL; INFILTRATION; INTRACAUDAL; PERINEURAL PRN
Status: DISCONTINUED | OUTPATIENT
Start: 2023-12-30 | End: 2023-12-31 | Stop reason: SDUPTHER

## 2023-12-31 RX ORDER — PHENYLEPHRINE HCL IN 0.9% NACL 1 MG/10 ML
SYRINGE (ML) INTRAVENOUS PRN
Status: DISCONTINUED | OUTPATIENT
Start: 2023-12-31 | End: 2023-12-31 | Stop reason: SDUPTHER

## 2023-12-31 RX ORDER — FENTANYL CITRATE 50 UG/ML
INJECTION, SOLUTION INTRAMUSCULAR; INTRAVENOUS PRN
Status: DISCONTINUED | OUTPATIENT
Start: 2023-12-31 | End: 2023-12-31 | Stop reason: SDUPTHER

## 2023-12-31 RX ORDER — IPRATROPIUM BROMIDE AND ALBUTEROL SULFATE 2.5; .5 MG/3ML; MG/3ML
1 SOLUTION RESPIRATORY (INHALATION)
Status: DISCONTINUED | OUTPATIENT
Start: 2023-12-31 | End: 2023-12-31

## 2023-12-31 RX ORDER — LABETALOL HYDROCHLORIDE 5 MG/ML
10 INJECTION, SOLUTION INTRAVENOUS
Status: DISCONTINUED | OUTPATIENT
Start: 2023-12-31 | End: 2024-01-02 | Stop reason: HOSPADM

## 2023-12-31 RX ORDER — SODIUM CHLORIDE 9 MG/ML
INJECTION, SOLUTION INTRAVENOUS PRN
Status: DISCONTINUED | OUTPATIENT
Start: 2023-12-31 | End: 2024-01-02 | Stop reason: HOSPADM

## 2023-12-31 RX ORDER — SCOLOPAMINE TRANSDERMAL SYSTEM 1 MG/1
1 PATCH, EXTENDED RELEASE TRANSDERMAL
Status: DISCONTINUED | OUTPATIENT
Start: 2023-12-31 | End: 2024-01-02 | Stop reason: HOSPADM

## 2023-12-31 RX ORDER — SUCRALFATE ORAL 1 G/10ML
1 SUSPENSION ORAL EVERY 6 HOURS SCHEDULED
Status: DISCONTINUED | OUTPATIENT
Start: 2023-12-31 | End: 2023-12-31

## 2023-12-31 RX ORDER — SODIUM CHLORIDE 0.9 % (FLUSH) 0.9 %
5-40 SYRINGE (ML) INJECTION PRN
Status: DISCONTINUED | OUTPATIENT
Start: 2023-12-31 | End: 2024-01-02 | Stop reason: HOSPADM

## 2023-12-31 RX ORDER — ONDANSETRON 2 MG/ML
INJECTION INTRAMUSCULAR; INTRAVENOUS PRN
Status: DISCONTINUED | OUTPATIENT
Start: 2023-12-31 | End: 2023-12-31 | Stop reason: SDUPTHER

## 2023-12-31 RX ORDER — ROCURONIUM BROMIDE 10 MG/ML
INJECTION, SOLUTION INTRAVENOUS PRN
Status: DISCONTINUED | OUTPATIENT
Start: 2023-12-31 | End: 2023-12-31 | Stop reason: SDUPTHER

## 2023-12-31 RX ORDER — SUCCINYLCHOLINE/SOD CL,ISO/PF 100 MG/5ML
SYRINGE (ML) INTRAVENOUS PRN
Status: DISCONTINUED | OUTPATIENT
Start: 2023-12-30 | End: 2023-12-31 | Stop reason: SDUPTHER

## 2023-12-31 RX ORDER — CEFAZOLIN SODIUM 1 G/3ML
INJECTION, POWDER, FOR SOLUTION INTRAMUSCULAR; INTRAVENOUS PRN
Status: DISCONTINUED | OUTPATIENT
Start: 2023-12-31 | End: 2023-12-31 | Stop reason: SDUPTHER

## 2023-12-31 RX ORDER — ONDANSETRON 2 MG/ML
4 INJECTION INTRAMUSCULAR; INTRAVENOUS EVERY 6 HOURS PRN
Status: DISCONTINUED | OUTPATIENT
Start: 2023-12-31 | End: 2024-01-02 | Stop reason: HOSPADM

## 2023-12-31 RX ADMIN — ONDANSETRON 4 MG: 2 INJECTION INTRAMUSCULAR; INTRAVENOUS at 11:30

## 2023-12-31 RX ADMIN — SODIUM CHLORIDE, PRESERVATIVE FREE 40 MG: 5 INJECTION INTRAVENOUS at 09:23

## 2023-12-31 RX ADMIN — HYDROMORPHONE HYDROCHLORIDE 0.5 MG: 1 INJECTION, SOLUTION INTRAMUSCULAR; INTRAVENOUS; SUBCUTANEOUS at 20:38

## 2023-12-31 RX ADMIN — FENTANYL CITRATE 50 MCG: 50 INJECTION, SOLUTION INTRAMUSCULAR; INTRAVENOUS at 05:13

## 2023-12-31 RX ADMIN — SODIUM CHLORIDE, PRESERVATIVE FREE 10 ML: 5 INJECTION INTRAVENOUS at 10:54

## 2023-12-31 RX ADMIN — DEXAMETHASONE SODIUM PHOSPHATE 4 MG: 10 INJECTION INTRAMUSCULAR; INTRAVENOUS at 00:18

## 2023-12-31 RX ADMIN — ONDANSETRON 4 MG: 2 INJECTION INTRAMUSCULAR; INTRAVENOUS at 23:55

## 2023-12-31 RX ADMIN — ROCURONIUM BROMIDE 10 MG: 10 INJECTION, SOLUTION INTRAVENOUS at 00:42

## 2023-12-31 RX ADMIN — FENTANYL CITRATE 50 MCG: 50 INJECTION, SOLUTION INTRAMUSCULAR; INTRAVENOUS at 01:18

## 2023-12-31 RX ADMIN — HYDROMORPHONE HYDROCHLORIDE 0.5 MG: 1 INJECTION, SOLUTION INTRAMUSCULAR; INTRAVENOUS; SUBCUTANEOUS at 23:40

## 2023-12-31 RX ADMIN — FENTANYL CITRATE 25 MCG: 50 INJECTION, SOLUTION INTRAMUSCULAR; INTRAVENOUS at 02:47

## 2023-12-31 RX ADMIN — FENTANYL CITRATE 50 MCG: 50 INJECTION, SOLUTION INTRAMUSCULAR; INTRAVENOUS at 18:16

## 2023-12-31 RX ADMIN — PIPERACILLIN AND TAZOBACTAM 3375 MG: 3; .375 INJECTION, POWDER, FOR SOLUTION INTRAVENOUS at 05:37

## 2023-12-31 RX ADMIN — FENTANYL CITRATE 50 MCG: 50 INJECTION, SOLUTION INTRAMUSCULAR; INTRAVENOUS at 09:26

## 2023-12-31 RX ADMIN — FENTANYL CITRATE 50 MCG: 50 INJECTION, SOLUTION INTRAMUSCULAR; INTRAVENOUS at 16:05

## 2023-12-31 RX ADMIN — ONDANSETRON 4 MG: 2 INJECTION INTRAMUSCULAR; INTRAVENOUS at 16:10

## 2023-12-31 RX ADMIN — SUGAMMADEX 180 MG: 100 INJECTION, SOLUTION INTRAVENOUS at 01:59

## 2023-12-31 RX ADMIN — HEPARIN SODIUM 5000 UNITS: 5000 INJECTION INTRAVENOUS; SUBCUTANEOUS at 16:10

## 2023-12-31 RX ADMIN — Medication 100 MCG: at 00:29

## 2023-12-31 RX ADMIN — IPRATROPIUM BROMIDE AND ALBUTEROL SULFATE 1 DOSE: 2.5; .5 SOLUTION RESPIRATORY (INHALATION) at 09:14

## 2023-12-31 RX ADMIN — IPRATROPIUM BROMIDE AND ALBUTEROL SULFATE 1 DOSE: 2.5; .5 SOLUTION RESPIRATORY (INHALATION) at 19:30

## 2023-12-31 RX ADMIN — SODIUM CHLORIDE, POTASSIUM CHLORIDE, SODIUM LACTATE AND CALCIUM CHLORIDE: 600; 310; 30; 20 INJECTION, SOLUTION INTRAVENOUS at 01:44

## 2023-12-31 RX ADMIN — FENTANYL CITRATE 50 MCG: 50 INJECTION, SOLUTION INTRAMUSCULAR; INTRAVENOUS at 00:19

## 2023-12-31 RX ADMIN — FENTANYL CITRATE 50 MCG: 50 INJECTION, SOLUTION INTRAMUSCULAR; INTRAVENOUS at 11:30

## 2023-12-31 RX ADMIN — PIPERACILLIN AND TAZOBACTAM 3375 MG: 3; .375 INJECTION, POWDER, FOR SOLUTION INTRAVENOUS at 16:12

## 2023-12-31 RX ADMIN — ROCURONIUM BROMIDE 50 MG: 10 INJECTION, SOLUTION INTRAVENOUS at 00:01

## 2023-12-31 RX ADMIN — CEFAZOLIN 2 G: 1 INJECTION, POWDER, FOR SOLUTION INTRAMUSCULAR; INTRAVENOUS at 00:13

## 2023-12-31 RX ADMIN — PIPERACILLIN AND TAZOBACTAM 3375 MG: 3; .375 INJECTION, POWDER, FOR SOLUTION INTRAVENOUS at 23:46

## 2023-12-31 RX ADMIN — SODIUM CHLORIDE, POTASSIUM CHLORIDE, SODIUM LACTATE AND CALCIUM CHLORIDE: 600; 310; 30; 20 INJECTION, SOLUTION INTRAVENOUS at 10:02

## 2023-12-31 RX ADMIN — ONDANSETRON 4 MG: 2 INJECTION INTRAMUSCULAR; INTRAVENOUS at 01:07

## 2023-12-31 RX ADMIN — HEPARIN SODIUM 5000 UNITS: 5000 INJECTION INTRAVENOUS; SUBCUTANEOUS at 05:37

## 2023-12-31 RX ADMIN — SODIUM CHLORIDE, PRESERVATIVE FREE 40 MG: 5 INJECTION INTRAVENOUS at 22:14

## 2023-12-31 RX ADMIN — IPRATROPIUM BROMIDE AND ALBUTEROL SULFATE 1 DOSE: 2.5; .5 SOLUTION RESPIRATORY (INHALATION) at 13:48

## 2023-12-31 RX ADMIN — PROCHLORPERAZINE EDISYLATE 5 MG: 5 INJECTION INTRAMUSCULAR; INTRAVENOUS at 02:39

## 2023-12-31 RX ADMIN — HEPARIN SODIUM 5000 UNITS: 5000 INJECTION INTRAVENOUS; SUBCUTANEOUS at 22:14

## 2023-12-31 ASSESSMENT — PAIN DESCRIPTION - ORIENTATION
ORIENTATION: RIGHT;LEFT;UPPER
ORIENTATION: LEFT;RIGHT;UPPER
ORIENTATION: RIGHT;LEFT
ORIENTATION: RIGHT;LEFT;UPPER

## 2023-12-31 ASSESSMENT — PAIN DESCRIPTION - DESCRIPTORS
DESCRIPTORS: ACHING
DESCRIPTORS: BURNING

## 2023-12-31 ASSESSMENT — PAIN SCALES - WONG BAKER: WONGBAKER_NUMERICALRESPONSE: 10

## 2023-12-31 ASSESSMENT — PAIN SCALES - GENERAL
PAINLEVEL_OUTOF10: 10
PAINLEVEL_OUTOF10: 8
PAINLEVEL_OUTOF10: 10
PAINLEVEL_OUTOF10: 0
PAINLEVEL_OUTOF10: 10
PAINLEVEL_OUTOF10: 10
PAINLEVEL_OUTOF10: 8
PAINLEVEL_OUTOF10: 6
PAINLEVEL_OUTOF10: 6
PAINLEVEL_OUTOF10: 8
PAINLEVEL_OUTOF10: 8
PAINLEVEL_OUTOF10: 6
PAINLEVEL_OUTOF10: 10
PAINLEVEL_OUTOF10: 2

## 2023-12-31 ASSESSMENT — PAIN DESCRIPTION - LOCATION
LOCATION: RIB CAGE
LOCATION: ABDOMEN

## 2023-12-31 NOTE — CARE COORDINATION
Case Management Assessment  Initial Evaluation    Date/Time of Evaluation: 12/31/2023 10:27 AM  Assessment Completed by: MICKI NGUYEN RN    If patient is discharged prior to next notation, then this note serves as note for discharge by case management.    Patient Name: Geoffery Lynne E Schober                   YOB: 1980  Diagnosis: Gastrojejunal ulcer with perforation (HCC) [K28.5]  Acute marginal ulcer [K28.3]  Chronic marginal ulcer with perforation (HCC) [K28.5]                   Date / Time: 12/30/2023  5:37 PM    Patient Admission Status: Inpatient   Readmission Risk (Low < 19, Mod (19-27), High > 27): No data recorded  Current PCP: Germain Aceves MD  PCP verified by ? Yes    Chart Reviewed: Yes      History Provided by: Patient  Patient Orientation: Alert and Oriented    Patient Cognition: Alert    Hospitalization in the last 30 days (Readmission):  No    If yes, Readmission Assessment in  Navigator will be completed.    Advance Directives:      Code Status: Full Code   Patient's Primary Decision Maker is: Legal Next of Kin      Discharge Planning:    Patient lives with: Spouse/Significant Other Type of Home: House  Primary Care Giver: Self  Patient Support Systems include: Spouse/Significant Other   Current Financial resources:    Current community resources:    Current services prior to admission: None            Current DME:              Type of Home Care services:  None    ADLS  Prior functional level: Independent in ADLs/IADLs  Current functional level: Assistance with the following:, Housework, Shopping, Mobility    PT AM-PAC:   /24  OT AM-PAC:   /24    Family can provide assistance at DC: Yes  Would you like Case Management to discuss the discharge plan with any other family members/significant others, and if so, who? No  Plans to Return to Present Housing: Yes  Other Identified Issues/Barriers to RETURNING to current housing: pain  Potential Assistance needed at discharge:  N/A            Potential DME:    Patient expects to discharge to: (P) House  Plan for transportation at discharge: (P) Family    Financial    Payor: [employer] / Plan: [employer] [address] / Product Type: *No Product type* /     Does insurance require precert for SNF: Yes    Potential assistance Purchasing Medications:    Meds-to-Beds request:        [id number] - [place], [place] - [address] - P [phone] - F [phone]  [address]  [place] [address]  Phone: [phone] Fax: [phone]      Notes:    Factors facilitating achievement of predicted outcomes: Family support, Cooperative, and Pleasant    Barriers to discharge: Pain    Additional Case Management Notes: Plan home. Transportation spouse.     The Plan for Transition of Care is related to the following treatment goals of Gastrojejunal ulcer with perforation (HCC) [K28.5]  Acute marginal ulcer [K28.3]  Chronic marginal ulcer with perforation (HCC) [K28.5]    IF APPLICABLE: The Patient and/or patient representative Imelda Alicea and her family were provided with a choice of provider and agrees with the discharge plan. Freedom of choice list with basic dialogue that supports the patient's individualized plan of care/goals and shares the quality data associated with the providers was provided to:     Patient Representative Name:       The Patient and/or Patient Representative Agree with the Discharge Plan?      MICKI NGUYEN RN  Case Management Department  Ph: [phone]

## 2023-12-31 NOTE — ED NOTES
Patient to room 10  Patient is a 43 year old female  Patient complains of LUQ and RUQ abdominal pain  Patient sent from [facility]  Patient has a hx of bariatric surgery 2019  Patient was informed she has free air (noted from CT at [facility])  Patient is uncomfortable  Pt alert and oriented x4, talking in complete sentences, respirations even and unlabored.   Pt acting age appropriate.   White board updated, will continue to asses, call light at side  Baker given

## 2023-12-31 NOTE — BRIEF OP NOTE
Brief Postoperative Note      Patient: Geoffery Lynne E Schober  YOB: 1980  MRN: [id number]    Date of Procedure: 12/30/2023    Preoperative Diagnosis:  Perforated Gastrojejunal Ulcer    Post-Op Diagnosis: Same       Procedure(s):  ROBOTIC REPAIR OF PERFORATED GASTRIC JEJUNAL ULCER, EGD    Surgeon(s):  Anjum Merrill DO    Assistant:  * No surgical staff found *    Anesthesia: General    Estimated Blood Loss (mL): Minimal    Complications: None    Specimens:   * No specimens in log *    Implants:  * No implants in log *      Drains:   Closed/Suction Drain Midline RUQ Bulb (Active)       Findings: Ulcer      Electronically signed by Anjum Merrill DO on 12/31/2023 at 2:05 AM

## 2023-12-31 NOTE — OP NOTE
Operative Note      Patient: Geoffery Lynne E Schober  YOB: 1980  MRN: [id number]    Date of Procedure: 12/30/2023    Preoperative Diagnosis:  Perforated Gastrojejunal Ulcer    Post-Op Diagnosis: Same       Procedure:  XI Robotic Repair of perforated Gastrojejunal ulcer.     Surgeon(s):  Anjum Merrill DO    Assistant:   * No surgical staff found *    Anesthesia: General    Estimated Blood Loss (mL): Minimal    Complications: None    Specimens:   * No specimens in log *    Implants:  * No implants in log *      Drains:   Closed/Suction Drain Midline RUQ Bulb (Active)       Findings:   PERFORATED GASTROJEJUNAL ULCER, POSTERIOR WALL  ADHESIONS UNDER LIVER AND IN LEFT UPPER QUADRANT  WOUND CLASS 4, FECULENT PERITONITIS WITH PUS  MALATHI LEFT NEAR REPAIR  ABDOMEN IRRIGATED WITH 1 LITERS SALINE     Detailed Description of Procedure:      History: Patient is a 43-year-old female who had gastric bypass and was vaping.  She presented with abdominal pain and free air.      Detailed Description of Procedure:   The patient was seen and examined in the preoperative area.  All imaging was reviewed.  Informed consent was obtained at bedside from the patient by myself.  All risk and possible complications were discussed in great detail.  The patient was then transferred from the preoperative area to the operating room on the [facility].  The patient was transferred to the operating room table in the supine position.  The patient underwent endotracheal intubation and general anesthesia via the anesthesia team.  A formal timeout was called verifying the correct patient, positioning, procedure to be performed, and preoperative antibiotics.  The patient received Ancef and Flagyl.      The patient's abdomen was then prepped and draped in the usual sterile fashion.  Prior to starting the procedure the patient's bilateral arms and legs were positioned and padded correctly.  After the patient was properly draped we made  a 12 mm port incision at Ferrer's point in the left upper quadrant and used the Optiview technique to gain access into the abdomen.  The abdomen then underwent insufflation creating a pneumoperitoneum.  After the abdomen was adequately insufflated we thoroughly examined the abdomen.  The area of entrance had no sign of injury to the surrounding structures.  The gastrojejunal anastomosis was covered with feculent purulent drainage concerning for the area of perforation.  We then placed four 8 mm ports across the abdomen.  The ports were all placed under direct visualization using the camera.  The skin was incised with a #11 blade scalpel and the ports were placed in the same fashion across the abdomen.  We did have to lyse the omentum off the abdominal wall and liver from the GJ anastomosis.     After placing the ports we used an atraumatic grasper to manipulate the small bowel with proper positioning of the patient in the head up position.  We then used an 11 blade scalpel to make a small epigastric incision to place the liver retractor.  The liver retractor was placed under direct visualization in the epigastric region.  The retractor was then positioned by elevating the left lobe of the liver off the stomach.  After the left lobe of the liver was elevated the area of perforation was better visualized.  A few more adhesions lysed down along the liver to allow for liver retractor placement, these were extending to near the josé.  We were then able to dock the da Tammy robot.       After the robot was properly docked all of the instruments were placed into the abdomen under direct visualization using the robotic camera.  We then used a tip up, vessel sealer, and fenestrated robotic grasper to adequately dissect and mobilize the tissue surrounding the gastrojejunal perforation.  After carefully dissecting the omentum and surrounding tissue away from the perforation site we were able to mobilize the perigastric fat

## 2023-12-31 NOTE — PROGRESS NOTES
Physical Therapy        Physical Therapy Cancel Note      DATE: 2023    NAME: Geoffery Lynne E Schober  MRN: [id number]   : 1980      Patient not seen this date for Physical Therapy due to:    Patient was ambulating in hallways earlier today. At current in too much pain for ambulation. Patient is agreeable and motivated in doing her own ambulation. Will defer PT eval.       Electronically signed by Jessica Pedroza PT on 2023 at 2:55 PM

## 2023-12-31 NOTE — H&P
General Surgery  Consult    PATIENT NAME: Geoffery Lynne E Schober  AGE: 43 y.o.  MEDICAL RECORD NO. [id number]  DATE: 12/30/2023  SURGEON: Dr. Santos  PRIMARY CARE PHYSICIAN: Germain Aceves MD    Patient evaluated at the request of  Dr. Szymanski  Reason for evaluation: concern for GJ perforation     Patient information was obtained from patient.  History/Exam limitations: none.  Patient presented to the Emergency Department by private vehicle.    IMPRESSION:     Patient Active Problem List   Diagnosis    History of depression    CURLY (obstructive sleep apnea)    Vitamin D deficiency    Status post gastric bypass for obesity    Obesity (BMI 30-39.9)    Abdominal pain    S/P total abdominal hysterectomy (2016)    Pelvic mass    Chronic cholecystitis with calculus    Epigastric abdominal pain    Abdominal pain, epigastric    S/P cholecystectomy    Acute marginal ulcer     Deanne Segura, 42 yo F who presents with epigastric pain with concern for perforated GJ ulcer.      PLAN:   Concern for perforated GJ marginal ulcer on CT scan at outlying facility.  Will repeat CT scan with oral contrast to evaluate for perforation.  Will evaluate for need for OR.  Admit patient to [facility].      HISTORY:   History of Chief Complaint:    Geoffery Lynne E Schober is a 43 y.o. female who presents with abdominal pain over the last 2 days.  Patient states that pain worsened at 10 pm yesterday. Patient had associated nausea and emesis.  She does state that she has had dark tarry appearing stools over the last several days.  Patient has history of Heaven-en-Y gastric bypass in 2019 and a laparoscopic cholecystectomy, abdominal hysterectomy.  She denies any known history of marginal ulcer in the past.  She denies any NSAID use.  She is a non-smoker.    Past Medical History   has a past medical history of Back pain, Depression, Migraines, and Sleep apnea.  Past Surgical History   has a past surgical history that includes Partial hysterectomy;

## 2023-12-31 NOTE — PLAN OF CARE
Problem: Discharge Planning  Goal: Discharge to home or other facility with appropriate resources  12/31/2023 1728 by Marisol Alexander RN  Outcome: Progressing  12/31/2023 0645 by Melissa Olivo RN  Outcome: Progressing     Problem: Pain  Goal: Verbalizes/displays adequate comfort level or baseline comfort level  12/31/2023 1728 by Marisol Alexander RN  Outcome: Progressing  12/31/2023 0645 by Melissa Olivo RN  Outcome: Progressing     Problem: Safety - Adult  Goal: Free from fall injury  12/31/2023 1728 by Marisol Alexander RN  Outcome: Progressing  12/31/2023 0645 by Melissa Olivo RN  Outcome: Progressing

## 2023-12-31 NOTE — ED NOTES
ED to inpatient nurses report      Chief Complaint:  Chief Complaint   Patient presents with    Perforated ulcer     Free air noted on CT at [facility]     Present to ED from: [facility]    MOA:     LOC: alert and orientated to name, place, date  Mobility: Independent  Oxygen Baseline: RA    Current needs required: none   Pending ED orders: none  Present condition: stable    Why did the patient come to the ED? Patient to room 10  Patient is a 43 year old female  Patient complains of LUQ and RUQ abdominal pain  Patient sent from [facility]  Patient has a hx of bariatric surgery 2019  Patient was informed she has free air (noted from CT at [facility])  Patient is uncomfortable  Pt alert and oriented x4, talking in complete sentences, respirations even and unlabored.   Pt acting age appropriate.   White board updated, will continue to asses, call light at side  Clifford given  What is the plan? CT with oral contrast, Surgery consulted  Any procedures or intervention occur? CT with oral contrast  Any safety concerns??    Mental Status:  GCS 15     Psych Assessment:   Psychosocial  Psychosocial (WDL): Within Defined Limits  Vital signs   Vitals:    12/30/23 1747 12/30/23 1819 12/30/23 1915 12/30/23 1917   BP: 115/66  (!) 110/56    Pulse:   78    Resp:  20 13    Temp:    98.2 °F (36.8 °C)   TempSrc:    Oral   SpO2:   99%         Vitals:  Patient Vitals for the past 24 hrs:   BP Temp Temp src Pulse Resp SpO2   12/30/23 1917 -- 98.2 °F (36.8 °C) Oral -- -- --   12/30/23 1915 (!) 110/56 -- -- 78 13 99 %   12/30/23 1819 -- -- -- -- 20 --   12/30/23 1747 115/66 -- -- -- -- --   12/30/23 1746 -- 98.2 °F (36.8 °C) Oral 84 16 100 %   12/30/23 1738 -- -- -- 81 17 98 %      Visit Vitals  BP (!) 110/56   Pulse 78   Temp 98.2 °F (36.8 °C) (Oral)   Resp 13   SpO2 99%        LDAs:   Peripheral IV 12/30/23 Right Antecubital (Active)       Ambulatory Status:  Presents to emergency department  because of falls (Syncope,

## 2023-12-31 NOTE — PROGRESS NOTES
Bariatric Surgery Progress Note          PATIENT NAME: Geoffery Lynne E Schober     TODAY'S DATE: 12/31/2023, 6:53 AM    SUBJECTIVE:    Pt seen and examined at bedside. Afebrile, vitals within normal. Tolerable pain with current pain regimen. Denies nausea or emesis. Has ambulated short distance postop and is urinating on her own.      OBJECTIVE:   VITALS:  /74   Pulse 88   Temp 97.9 °F (36.6 °C) (Axillary)   Resp 15   Ht 1.626 m (5' 4\")   Wt 90.6 kg (199 lb 11.8 oz)   SpO2 95%   BMI 34.28 kg/m²      INTAKE/OUTPUT:      Intake/Output Summary (Last 24 hours) at 12/31/2023 0653  Last data filed at 12/31/2023 0217  Gross per 24 hour   Intake 900 ml   Output 25 ml   Net 875 ml     CONSTITUTIONAL:  NAD, A&O x 3  HEENT: EOMI, moist mucous membranes  LUNGS:  normal effort with symmetric rise and fall of chest wall, no accessory muscle use, no wheezing   CARDIOVASCULAR:  regular rate and rhythm  ABDOMEN: Soft, nondistended, appropriately TTP, incision with dressing CDI, MALATHI drain with SS output   EXTREMITIES: no rashes, lesions, edema.    Data:  CBC:   Lab Results   Component Value Date/Time    WBC 8.2 12/31/2023 05:58 AM    RBC 3.31 12/31/2023 05:58 AM    HGB 9.2 12/31/2023 05:58 AM    HCT 31.6 12/31/2023 05:58 AM    MCV 95.5 12/31/2023 05:58 AM    MCH 27.8 12/31/2023 05:58 AM    MCHC 29.1 12/31/2023 05:58 AM    RDW 11.9 12/31/2023 05:58 AM     12/31/2023 05:58 AM    MPV 10.6 12/31/2023 05:58 AM     BMP:    Lab Results   Component Value Date/Time     12/31/2023 05:58 AM    K 3.9 12/31/2023 05:58 AM     12/31/2023 05:58 AM    CO2 15 12/31/2023 05:58 AM    BUN 10 12/31/2023 05:58 AM    LABALBU 4.2 12/30/2023 01:39 PM    CREATININE 0.5 12/31/2023 05:58 AM    CALCIUM 8.4 12/31/2023 05:58 AM    GFRAA >60 06/08/2022 06:02 AM    LABGLOM >60 12/31/2023 05:58 AM    GLUCOSE 112 12/31/2023 05:58 AM         ASSESSMENT   Patient Active Problem List   Diagnosis    History of depression    CURLY (obstructive

## 2023-12-31 NOTE — ED PROVIDER NOTES
[facility] ED  Emergency Department  Emergency Medicine Resident Turn-Over     Note Started: 8:02 PM EST    Care of Geoffery Lynne E Schober was assumed from Dr. James and is being seen for Perforated ulcer (Free air noted on CT at [facility])  .  The patient's initial evaluation and plan have been discussed with the prior provider who initially evaluated the patient.     EMERGENCY DEPARTMENT COURSE / MEDICAL DECISION MAKING:       MEDICATIONS GIVEN:  Orders Placed This Encounter   Medications    morphine injection 4 mg    ondansetron (ZOFRAN) injection 4 mg    sodium chloride 0.9 % bolus 1,000 mL    iohexol (OMNIPAQUE 240) Oral 50 mL    iopamidol (ISOVUE-370) 76 % injection 75 mL    sodium chloride flush 0.9 % injection 5-40 mL    sodium chloride flush 0.9 % injection 5-40 mL    0.9 % sodium chloride infusion    OR Linked Order Group     potassium chloride 20 mEq/50 mL IVPB (Central Line)     potassium chloride 10 mEq/100 mL IVPB (Peripheral Line)    magnesium sulfate 2000 mg in 50 mL IVPB premix    OR Linked Order Group     ondansetron (ZOFRAN-ODT) disintegrating tablet 4 mg     ondansetron (ZOFRAN) injection 4 mg    polyethylene glycol (GLYCOLAX) packet 17 g    lactated ringers IV soln infusion    fentaNYL (SUBLIMAZE) injection 50 mcg    ondansetron (ZOFRAN) injection 4 mg       LABS / RADIOLOGY:     Labs Reviewed   CBC WITH AUTO DIFFERENTIAL   BASIC METABOLIC PANEL   MAGNESIUM       CT ABDOMEN PELVIS W IV CONTRAST Additional Contrast? None    Result Date: 12/30/2023  EXAMINATION: CT OF THE ABDOMEN AND PELVIS WITH CONTRAST 12/30/2023 2:16 pm TECHNIQUE: CT of the abdomen and pelvis was performed with the administration of intravenous contrast. Multiplanar reformatted images are provided for review. Automated exposure control, iterative reconstruction, and/or weight based adjustment of the mA/kV was utilized to reduce the radiation dose to as low as reasonably achievable. COMPARISON: 06/06/2022  °C),  Pulse: 78, Respirations: 13, BP: (!) 110/56, SpO2: 99 %    This patient is a 43 y.o. Female with transfer from outside hospital with perforated ulcer history of Heaven-en-Y admitted to the general surgery    ED Course as of 12/30/23 2112   Sat Dec 30, 2023   1907 Hx  rn-y perf ulcer Or w/surg [ZE]   1921 General surgery to obtain CT with p.o. contrast.  If p.o. contrast extravasation will take 2 OR, if no extravasation will admit for observation. [SK]   2052 GS admitted, awaiting final read on CT [ZE]      ED Course User Index  [SK] Amanda James DO  [ZE] Satnam Norris DO       OUTSTANDING TASKS / RECOMMENDATIONS:    Surgery recommendations     FINAL IMPRESSION:     1. Chronic marginal ulcer with perforation (HCC)        DISPOSITION:         DISPOSITION:  []  Discharge   []  Transfer -    [x]  Admission -     []  Against Medical Advice   []  Eloped   FOLLOW-UP: No follow-up provider specified.   DISCHARGE MEDICATIONS: New Prescriptions    No medications on file           Satnam Norris DO  Emergency Medicine Resident  [facility]

## 2023-12-31 NOTE — ED NOTES
Received pt from [facility].  Pt is awake, currently taking oral contrast for CT.  Pt denies any pain but states more of discomfort on her abdomen this time.  Family at bedside.  Pt is NAD.  Will continue with POC.

## 2023-12-31 NOTE — ANESTHESIA PRE PROCEDURE
Department of Anesthesiology  Preprocedure Note       Name:  Geoffery Lynne E Schober   Age:  43 y.o.  :  1980                                          MRN:  [id number]         Date:  2023      Surgeon: Surgeon(s):  Anjum Merrill DO    Procedure: Procedure(s):  ROBOTIC REPAIR OF PERFORATED GASTRIC JEJUNAL ULCER    Medications prior to admission:   Prior to Admission medications    Medication Sig Start Date End Date Taking? Authorizing Provider   docusate sodium (COLACE) 100 MG capsule Take 1 capsule by mouth 2 times daily   Yes Provider, MD Rabia       Current medications:    Current Facility-Administered Medications   Medication Dose Route Frequency Provider Last Rate Last Admin   • iohexol (OMNIPAQUE 240) Oral 50 mL  50 mL Oral ONCE PRN Willy Szymanski MD       • sodium chloride flush 0.9 % injection 5-40 mL  5-40 mL IntraVENous 2 times per day Rosalinda William MD       • sodium chloride flush 0.9 % injection 5-40 mL  5-40 mL IntraVENous PRN Rosalinda William MD       • 0.9 % sodium chloride infusion   IntraVENous PRN Rosalinda William MD       • potassium chloride 20 mEq/50 mL IVPB (Central Line)  20 mEq IntraVENous PRN Rosalinda William MD        Or   • potassium chloride 10 mEq/100 mL IVPB (Peripheral Line)  10 mEq IntraVENous PRN Rosalinda William MD       • magnesium sulfate 2000 mg in 50 mL IVPB premix  2,000 mg IntraVENous PRN Rosalinda William MD       • ondansetron (ZOFRAN-ODT) disintegrating tablet 4 mg  4 mg Oral Q8H PRN Rosalinda William MD        Or   • ondansetron (ZOFRAN) injection 4 mg  4 mg IntraVENous Q6H PRN Rosalinda William MD       • polyethylene glycol (GLYCOLAX) packet 17 g  17 g Oral Daily Rosalinda William MD       • lactated ringers IV soln infusion   IntraVENous Continuous Rosalinda William  mL/hr at 23 New Bag at 23   • fentaNYL (SUBLIMAZE) injection 50 mcg  50 mcg IntraVENous Q2H PRN Rosalinda William MD   50 mcg at 23     Current

## 2023-12-31 NOTE — ED NOTES
CT informed that pt is done with oral contrast, writer was told they will be ready for pt at 2030.  Pt updated.

## 2023-12-31 NOTE — PROGRESS NOTES
[facility]  PROGRESS NOTE    Shift date: 12/30/2023  Shift day: Saturday   Shift # 3    Room # ED to OR  Name: Geoffery Lynne E Schober                Christianity: Unknown   Place of Alevism: Unknown    Referral: Pre-Op    Admit Date & Time: 12/30/2023  5:37 PM    Assessment:  Geoffery Lynne E Schober is a 43 y.o. female in the hospital because of of undergoing \"emergent surgery.\"  met patient's spouse in the ED Waiting Room, as patient had already been transported from the ED to OR19.     Intervention:   received a call from ED Triage requesting assistance for patient's spouse.  introduced herself to spouse and learned that patient had been taken to the OR. Per report, patient arrived to the ED with an \"Acute Marginal Ulcer.\" Patient underwent \"emergent surgery\" near 0010.  escorted spouse to the OR Waiting Room where other family members were present. Patient's family thanked  for care and support.     Outcome:  Patient's spouse thanked  for visit.     Plan:  Chaplains will remain available to offer spiritual and emotional support as needed.     12/31/23 0004   Encounter Summary   Encounter Overview/Reason  Pre-Op   Service Provided For: Family   Referral/Consult From: Multi-disciplinary team  (ED Charge Nurse)   Support System Spouse   Last Encounter  12/30/23   Complexity of Encounter Moderate   Begin Time 0004   End Time  0032   Total Time Calculated 28 min   Spiritual/Emotional needs   Type Spiritual Support   Assessment/Intervention/Outcome   Assessment Anxious;Fearful   Intervention Sustaining Presence/Ministry of presence   Outcome Receptive   Plan and Referrals   Plan/Referrals Continue to visit, (comment)  (as needed)     Electronically signed by Chaplain Tracy, on 12/31/2023 at 8:38 AM.  [facility]  [phone]

## 2023-12-31 NOTE — PROGRESS NOTES
Patient seen and examined.  On review of CT there is more suspected free air.  She is peritoneal in the epigastrium on exam.  She reports vaping.      Clinically suspect gastrojejunal ulcer perforation    We discussed options, medical management vs surgery.      Risks of surgery discussed including but not limited to bleeding, infection, further surgery, damage to surrounding structures, death, DVT/PE, need for G tube, need for open procedure, prolonged hospital stay, transfusion, recurrent ulcer, fistula or leak.      She has elected to move forward with surgery    All questions answered, with sister at bedside.

## 2024-01-01 ENCOUNTER — APPOINTMENT (OUTPATIENT)
Dept: GENERAL RADIOLOGY | Age: 44
DRG: 326 | End: 2024-01-01
Payer: COMMERCIAL

## 2024-01-01 LAB
ANION GAP SERPL CALCULATED.3IONS-SCNC: 11 MMOL/L (ref 9–17)
BASOPHILS # BLD: <0.03 K/UL (ref 0–0.2)
BASOPHILS NFR BLD: 0 % (ref 0–2)
BUN SERPL-MCNC: 7 MG/DL (ref 6–20)
CALCIUM SERPL-MCNC: 8.7 MG/DL (ref 8.6–10.4)
CHLORIDE SERPL-SCNC: 106 MMOL/L (ref 98–107)
CO2 SERPL-SCNC: 24 MMOL/L (ref 20–31)
CREAT SERPL-MCNC: 0.5 MG/DL (ref 0.5–0.9)
EOSINOPHIL # BLD: <0.03 K/UL (ref 0–0.44)
EOSINOPHILS RELATIVE PERCENT: 0 % (ref 1–4)
ERYTHROCYTE [DISTWIDTH] IN BLOOD BY AUTOMATED COUNT: 12.1 % (ref 11.8–14.4)
GFR SERPL CREATININE-BSD FRML MDRD: >60 ML/MIN/1.73M2
GLUCOSE SERPL-MCNC: 131 MG/DL (ref 70–99)
HCT VFR BLD AUTO: 28.4 % (ref 36.3–47.1)
HGB BLD-MCNC: 8.7 G/DL (ref 11.9–15.1)
IMM GRANULOCYTES # BLD AUTO: <0.03 K/UL (ref 0–0.3)
IMM GRANULOCYTES NFR BLD: 0 %
LYMPHOCYTES NFR BLD: 1.42 K/UL (ref 1.1–3.7)
LYMPHOCYTES RELATIVE PERCENT: 18 % (ref 24–43)
MAGNESIUM SERPL-MCNC: 2.1 MG/DL (ref 1.6–2.6)
MCH RBC QN AUTO: 27.7 PG (ref 25.2–33.5)
MCHC RBC AUTO-ENTMCNC: 30.6 G/DL (ref 28.4–34.8)
MCV RBC AUTO: 90.4 FL (ref 82.6–102.9)
MONOCYTES NFR BLD: 0.41 K/UL (ref 0.1–1.2)
MONOCYTES NFR BLD: 5 % (ref 3–12)
NEUTROPHILS NFR BLD: 76 % (ref 36–65)
NEUTS SEG NFR BLD: 6.02 K/UL (ref 1.5–8.1)
NRBC BLD-RTO: 0 PER 100 WBC
PLATELET # BLD AUTO: 275 K/UL (ref 138–453)
PMV BLD AUTO: 9.9 FL (ref 8.1–13.5)
POTASSIUM SERPL-SCNC: 4 MMOL/L (ref 3.7–5.3)
RBC # BLD AUTO: 3.14 M/UL (ref 3.95–5.11)
SODIUM SERPL-SCNC: 141 MMOL/L (ref 135–144)
WBC OTHER # BLD: 7.9 K/UL (ref 3.5–11.3)

## 2024-01-01 PROCEDURE — 2580000003 HC RX 258: Performed by: SURGERY

## 2024-01-01 PROCEDURE — 6360000002 HC RX W HCPCS: Performed by: STUDENT IN AN ORGANIZED HEALTH CARE EDUCATION/TRAINING PROGRAM

## 2024-01-01 PROCEDURE — C9113 INJ PANTOPRAZOLE SODIUM, VIA: HCPCS | Performed by: STUDENT IN AN ORGANIZED HEALTH CARE EDUCATION/TRAINING PROGRAM

## 2024-01-01 PROCEDURE — 2580000003 HC RX 258: Performed by: STUDENT IN AN ORGANIZED HEALTH CARE EDUCATION/TRAINING PROGRAM

## 2024-01-01 PROCEDURE — 6360000002 HC RX W HCPCS: Performed by: SURGERY

## 2024-01-01 PROCEDURE — 74240 X-RAY XM UPR GI TRC 1CNTRST: CPT

## 2024-01-01 PROCEDURE — 6370000000 HC RX 637 (ALT 250 FOR IP): Performed by: STUDENT IN AN ORGANIZED HEALTH CARE EDUCATION/TRAINING PROGRAM

## 2024-01-01 PROCEDURE — 36415 COLL VENOUS BLD VENIPUNCTURE: CPT

## 2024-01-01 PROCEDURE — 2500000003 HC RX 250 WO HCPCS: Performed by: STUDENT IN AN ORGANIZED HEALTH CARE EDUCATION/TRAINING PROGRAM

## 2024-01-01 PROCEDURE — 1200000000 HC SEMI PRIVATE

## 2024-01-01 PROCEDURE — 83735 ASSAY OF MAGNESIUM: CPT

## 2024-01-01 PROCEDURE — 85025 COMPLETE CBC W/AUTO DIFF WBC: CPT

## 2024-01-01 PROCEDURE — 99024 POSTOP FOLLOW-UP VISIT: CPT | Performed by: SURGERY

## 2024-01-01 PROCEDURE — 6360000004 HC RX CONTRAST MEDICATION: Performed by: STUDENT IN AN ORGANIZED HEALTH CARE EDUCATION/TRAINING PROGRAM

## 2024-01-01 PROCEDURE — 80048 BASIC METABOLIC PNL TOTAL CA: CPT

## 2024-01-01 RX ORDER — PROMETHAZINE HYDROCHLORIDE 25 MG/ML
12.5 INJECTION, SOLUTION INTRAMUSCULAR; INTRAVENOUS EVERY 6 HOURS PRN
Status: DISCONTINUED | OUTPATIENT
Start: 2024-01-01 | End: 2024-01-02 | Stop reason: HOSPADM

## 2024-01-01 RX ORDER — DEXTROSE MONOHYDRATE, SODIUM CHLORIDE, AND POTASSIUM CHLORIDE 50; 1.49; 4.5 G/1000ML; G/1000ML; G/1000ML
INJECTION, SOLUTION INTRAVENOUS CONTINUOUS
Status: DISCONTINUED | OUTPATIENT
Start: 2024-01-01 | End: 2024-01-02

## 2024-01-01 RX ORDER — OXYCODONE HYDROCHLORIDE AND ACETAMINOPHEN 5; 325 MG/1; MG/1
1 TABLET ORAL EVERY 6 HOURS PRN
Status: DISCONTINUED | OUTPATIENT
Start: 2024-01-01 | End: 2024-01-01

## 2024-01-01 RX ORDER — OXYCODONE HYDROCHLORIDE 5 MG/1
5 TABLET ORAL EVERY 6 HOURS PRN
Status: DISCONTINUED | OUTPATIENT
Start: 2024-01-01 | End: 2024-01-02 | Stop reason: HOSPADM

## 2024-01-01 RX ADMIN — HYDROMORPHONE HYDROCHLORIDE 0.25 MG: 1 INJECTION, SOLUTION INTRAMUSCULAR; INTRAVENOUS; SUBCUTANEOUS at 22:34

## 2024-01-01 RX ADMIN — OXYCODONE 5 MG: 5 TABLET ORAL at 20:59

## 2024-01-01 RX ADMIN — DIATRIZOATE MEGLUMINE AND DIATRIZOATE SODIUM 40 ML: 660; 100 LIQUID ORAL; RECTAL at 11:53

## 2024-01-01 RX ADMIN — SODIUM CHLORIDE, PRESERVATIVE FREE 40 MG: 5 INJECTION INTRAVENOUS at 09:01

## 2024-01-01 RX ADMIN — POTASSIUM CHLORIDE, DEXTROSE MONOHYDRATE AND SODIUM CHLORIDE: 150; 5; 450 INJECTION, SOLUTION INTRAVENOUS at 16:27

## 2024-01-01 RX ADMIN — HYDROMORPHONE HYDROCHLORIDE 0.5 MG: 1 INJECTION, SOLUTION INTRAMUSCULAR; INTRAVENOUS; SUBCUTANEOUS at 02:47

## 2024-01-01 RX ADMIN — PIPERACILLIN AND TAZOBACTAM 3375 MG: 3; .375 INJECTION, POWDER, FOR SOLUTION INTRAVENOUS at 09:06

## 2024-01-01 RX ADMIN — PIPERACILLIN AND TAZOBACTAM 3375 MG: 3; .375 INJECTION, POWDER, FOR SOLUTION INTRAVENOUS at 15:40

## 2024-01-01 RX ADMIN — SODIUM CHLORIDE, PRESERVATIVE FREE 10 ML: 5 INJECTION INTRAVENOUS at 09:09

## 2024-01-01 RX ADMIN — POTASSIUM CHLORIDE, DEXTROSE MONOHYDRATE AND SODIUM CHLORIDE: 150; 5; 450 INJECTION, SOLUTION INTRAVENOUS at 06:56

## 2024-01-01 RX ADMIN — HEPARIN SODIUM 5000 UNITS: 5000 INJECTION INTRAVENOUS; SUBCUTANEOUS at 05:46

## 2024-01-01 RX ADMIN — HYDROMORPHONE HYDROCHLORIDE 0.5 MG: 1 INJECTION, SOLUTION INTRAMUSCULAR; INTRAVENOUS; SUBCUTANEOUS at 12:18

## 2024-01-01 RX ADMIN — ONDANSETRON 4 MG: 2 INJECTION INTRAMUSCULAR; INTRAVENOUS at 17:51

## 2024-01-01 RX ADMIN — PIPERACILLIN AND TAZOBACTAM 3375 MG: 3; .375 INJECTION, POWDER, FOR SOLUTION INTRAVENOUS at 23:39

## 2024-01-01 RX ADMIN — HYDROMORPHONE HYDROCHLORIDE 0.5 MG: 1 INJECTION, SOLUTION INTRAMUSCULAR; INTRAVENOUS; SUBCUTANEOUS at 09:01

## 2024-01-01 RX ADMIN — SODIUM CHLORIDE, PRESERVATIVE FREE 40 MG: 5 INJECTION INTRAVENOUS at 21:00

## 2024-01-01 RX ADMIN — HEPARIN SODIUM 5000 UNITS: 5000 INJECTION INTRAVENOUS; SUBCUTANEOUS at 15:30

## 2024-01-01 RX ADMIN — PROMETHAZINE HYDROCHLORIDE 12.5 MG: 25 INJECTION INTRAMUSCULAR; INTRAVENOUS at 22:35

## 2024-01-01 RX ADMIN — HYDROMORPHONE HYDROCHLORIDE 0.25 MG: 1 INJECTION, SOLUTION INTRAMUSCULAR; INTRAVENOUS; SUBCUTANEOUS at 18:41

## 2024-01-01 RX ADMIN — ONDANSETRON 4 MG: 2 INJECTION INTRAMUSCULAR; INTRAVENOUS at 05:46

## 2024-01-01 RX ADMIN — HYDROMORPHONE HYDROCHLORIDE 0.5 MG: 1 INJECTION, SOLUTION INTRAMUSCULAR; INTRAVENOUS; SUBCUTANEOUS at 05:45

## 2024-01-01 RX ADMIN — ONDANSETRON 4 MG: 2 INJECTION INTRAMUSCULAR; INTRAVENOUS at 11:23

## 2024-01-01 ASSESSMENT — PAIN DESCRIPTION - LOCATION
LOCATION: ABDOMEN

## 2024-01-01 ASSESSMENT — PAIN SCALES - GENERAL
PAINLEVEL_OUTOF10: 10
PAINLEVEL_OUTOF10: 7
PAINLEVEL_OUTOF10: 7
PAINLEVEL_OUTOF10: 8
PAINLEVEL_OUTOF10: 10
PAINLEVEL_OUTOF10: 7

## 2024-01-01 ASSESSMENT — PAIN DESCRIPTION - ORIENTATION
ORIENTATION: LEFT;UPPER;RIGHT
ORIENTATION: UPPER
ORIENTATION: RIGHT;LEFT;MID
ORIENTATION: UPPER

## 2024-01-01 ASSESSMENT — PAIN DESCRIPTION - DESCRIPTORS
DESCRIPTORS: ACHING
DESCRIPTORS: STABBING
DESCRIPTORS: STABBING
DESCRIPTORS: ACHING

## 2024-01-01 NOTE — PLAN OF CARE
Problem: Respiratory - Adult  Goal: Achieves optimal ventilation and oxygenation  Outcome: Progressing   BRONCHOSPASM/BRONCHOCONSTRICTION     [x]         IMPROVE AERATION/BREATH SOUNDS  [x]   ADMINISTER BRONCHODILATOR THERAPY AS APPROPRIATE  [x]   ASSESS BREATH SOUNDS  []   IMPLEMENT AEROSOL/MDI PROTOCOL  [x]   PATIENT EDUCATION AS NEEDED

## 2024-01-01 NOTE — PROGRESS NOTES
Bariatric Surgery Progress Note          PATIENT NAME: Geoffery Lynne E Schober     TODAY'S DATE: 1/1/2024, 6:40 AM    SUBJECTIVE:    Pt seen and examined at bedside. Afebrile. Some postop pain present, but tolerable with regimen. She is up walking in the vergara this AM. Endorses some intermittent nausea, no emesis. 100cc SS from MALATHI drain overnight.      OBJECTIVE:   VITALS:  BP (!) 92/56   Pulse 90   Temp 98.1 °F (36.7 °C) (Oral)   Resp 19   Ht 1.626 m (5' 4\")   Wt 90.6 kg (199 lb 11.8 oz)   SpO2 98%   BMI 34.28 kg/m²      INTAKE/OUTPUT:    No intake or output data in the 24 hours ending 01/01/24 0640    CONSTITUTIONAL:  NAD, A&O x 3  HEENT: EOMI, moist mucous membranes  LUNGS:  normal effort with symmetric rise and fall of chest wall, no accessory muscle use, no wheezing   CARDIOVASCULAR:  regular rate and rhythm  ABDOMEN: Soft, nondistended, appropriately TTP, incision with dressing CDI, MALATHI drain with SS output   EXTREMITIES: no rashes, lesions, edema.    Data:  CBC:   Lab Results   Component Value Date/Time    WBC 8.2 12/31/2023 05:58 AM    RBC 3.31 12/31/2023 05:58 AM    HGB 9.2 12/31/2023 05:58 AM    HCT 31.6 12/31/2023 05:58 AM    MCV 95.5 12/31/2023 05:58 AM    MCH 27.8 12/31/2023 05:58 AM    MCHC 29.1 12/31/2023 05:58 AM    RDW 11.9 12/31/2023 05:58 AM     12/31/2023 05:58 AM    MPV 10.6 12/31/2023 05:58 AM     BMP:    Lab Results   Component Value Date/Time     12/31/2023 05:58 AM    K 3.9 12/31/2023 05:58 AM     12/31/2023 05:58 AM    CO2 15 12/31/2023 05:58 AM    BUN 10 12/31/2023 05:58 AM    LABALBU 4.2 12/30/2023 01:39 PM    CREATININE 0.5 12/31/2023 05:58 AM    CALCIUM 8.4 12/31/2023 05:58 AM    GFRAA >60 06/08/2022 06:02 AM    LABGLOM >60 12/31/2023 05:58 AM    GLUCOSE 112 12/31/2023 05:58 AM     ASSESSMENT   Patient Active Problem List   Diagnosis    History of depression    CURLY (obstructive sleep apnea)    Vitamin D deficiency    Status post gastric bypass for obesity

## 2024-01-01 NOTE — PLAN OF CARE
Problem: Discharge Planning  Goal: Discharge to home or other facility with appropriate resources  1/1/2024 0219 by Melissa Olivo RN  Outcome: Progressing  12/31/2023 1728 by Marisol Alexander RN  Outcome: Progressing     Problem: Pain  Goal: Verbalizes/displays adequate comfort level or baseline comfort level  1/1/2024 0219 by Melissa Olivo RN  Outcome: Progressing  12/31/2023 1728 by Marisol Alexander RN  Outcome: Progressing     Problem: Safety - Adult  Goal: Free from fall injury  1/1/2024 0219 by Melissa Olivo RN  Outcome: Progressing  12/31/2023 1728 by Marisol Alexander RN  Outcome: Progressing     Problem: Respiratory - Adult  Goal: Achieves optimal ventilation and oxygenation  1/1/2024 0219 by Melissa Olivo RN  Outcome: Progressing  12/31/2023 1936 by Regis Zhong RCP  Outcome: Progressing

## 2024-01-01 NOTE — PLAN OF CARE
Problem: Discharge Planning  Goal: Discharge to home or other facility with appropriate resources  1/1/2024 1500 by Yanet Velarde RN  Outcome: Progressing  Flowsheets (Taken 1/1/2024 1500)  Discharge to home or other facility with appropriate resources:   Identify barriers to discharge with patient and caregiver   Identify discharge learning needs (meds, wound care, etc)   Arrange for needed discharge resources and transportation as appropriate  1/1/2024 0219 by Melissa Olivo RN  Outcome: Progressing     Problem: Pain  Goal: Verbalizes/displays adequate comfort level or baseline comfort level  1/1/2024 1500 by Yanet Velarde RN  Outcome: Progressing  Flowsheets (Taken 1/1/2024 1500)  Verbalizes/displays adequate comfort level or baseline comfort level:   Encourage patient to monitor pain and request assistance   Administer analgesics based on type and severity of pain and evaluate response   Assess pain using appropriate pain scale   Implement non-pharmacological measures as appropriate and evaluate response  1/1/2024 0219 by Melissa Olivo RN  Outcome: Progressing     Problem: Safety - Adult  Goal: Free from fall injury  1/1/2024 1500 by Yanet Velarde RN  Outcome: Progressing  Flowsheets (Taken 1/1/2024 1500)  Free From Fall Injury: Instruct family/caregiver on patient safety  1/1/2024 0219 by eMlissa Olivo RN  Outcome: Progressing     Problem: Respiratory - Adult  Goal: Achieves optimal ventilation and oxygenation  1/1/2024 1500 by Yanet Velarde RN  Outcome: Progressing  Flowsheets (Taken 1/1/2024 1500)  Achieves optimal ventilation and oxygenation:   Assess for changes in respiratory status   Assess for changes in mentation and behavior   Respiratory therapy support as indicated  1/1/2024 0219 by Melissa Olivo RN  Outcome: Progressing     Problem: Cardiovascular - Adult  Goal: Maintains optimal cardiac output and hemodynamic stability  Outcome: Progressing  Flowsheets (Taken

## 2024-01-02 VITALS
DIASTOLIC BLOOD PRESSURE: 62 MMHG | HEART RATE: 73 BPM | BODY MASS INDEX: 34.1 KG/M2 | OXYGEN SATURATION: 98 % | RESPIRATION RATE: 16 BRPM | HEIGHT: 64 IN | SYSTOLIC BLOOD PRESSURE: 108 MMHG | TEMPERATURE: 97.4 F | WEIGHT: 199.74 LBS

## 2024-01-02 LAB
ANION GAP SERPL CALCULATED.3IONS-SCNC: 11 MMOL/L (ref 9–17)
BASOPHILS # BLD: <0.03 K/UL (ref 0–0.2)
BASOPHILS NFR BLD: 0 % (ref 0–2)
BUN SERPL-MCNC: 3 MG/DL (ref 6–20)
CALCIUM SERPL-MCNC: 8.6 MG/DL (ref 8.6–10.4)
CHLORIDE SERPL-SCNC: 108 MMOL/L (ref 98–107)
CO2 SERPL-SCNC: 20 MMOL/L (ref 20–31)
CREAT SERPL-MCNC: 0.6 MG/DL (ref 0.5–0.9)
EKG ATRIAL RATE: 100 BPM
EKG P AXIS: 69 DEGREES
EKG P-R INTERVAL: 122 MS
EKG Q-T INTERVAL: 350 MS
EKG QRS DURATION: 90 MS
EKG QTC CALCULATION (BAZETT): 451 MS
EKG R AXIS: 66 DEGREES
EKG T AXIS: 64 DEGREES
EKG VENTRICULAR RATE: 100 BPM
EOSINOPHIL # BLD: 0.08 K/UL (ref 0–0.44)
EOSINOPHILS RELATIVE PERCENT: 2 % (ref 1–4)
ERYTHROCYTE [DISTWIDTH] IN BLOOD BY AUTOMATED COUNT: 11.9 % (ref 11.8–14.4)
GFR SERPL CREATININE-BSD FRML MDRD: >60 ML/MIN/1.73M2
GLUCOSE SERPL-MCNC: 117 MG/DL (ref 70–99)
HCT VFR BLD AUTO: 26.9 % (ref 36.3–47.1)
HGB BLD-MCNC: 8.1 G/DL (ref 11.9–15.1)
IMM GRANULOCYTES # BLD AUTO: <0.03 K/UL (ref 0–0.3)
IMM GRANULOCYTES NFR BLD: 0 %
LYMPHOCYTES NFR BLD: 1.88 K/UL (ref 1.1–3.7)
LYMPHOCYTES RELATIVE PERCENT: 35 % (ref 24–43)
MAGNESIUM SERPL-MCNC: 2.3 MG/DL (ref 1.6–2.6)
MCH RBC QN AUTO: 27.7 PG (ref 25.2–33.5)
MCHC RBC AUTO-ENTMCNC: 30.1 G/DL (ref 28.4–34.8)
MCV RBC AUTO: 92.1 FL (ref 82.6–102.9)
MONOCYTES NFR BLD: 0.37 K/UL (ref 0.1–1.2)
MONOCYTES NFR BLD: 7 % (ref 3–12)
NEUTROPHILS NFR BLD: 56 % (ref 36–65)
NEUTS SEG NFR BLD: 3.01 K/UL (ref 1.5–8.1)
NRBC BLD-RTO: 0 PER 100 WBC
PLATELET # BLD AUTO: 264 K/UL (ref 138–453)
PMV BLD AUTO: 10.5 FL (ref 8.1–13.5)
POTASSIUM SERPL-SCNC: 3.9 MMOL/L (ref 3.7–5.3)
RBC # BLD AUTO: 2.92 M/UL (ref 3.95–5.11)
SODIUM SERPL-SCNC: 139 MMOL/L (ref 135–144)
WBC OTHER # BLD: 5.4 K/UL (ref 3.5–11.3)

## 2024-01-02 PROCEDURE — 6360000002 HC RX W HCPCS: Performed by: STUDENT IN AN ORGANIZED HEALTH CARE EDUCATION/TRAINING PROGRAM

## 2024-01-02 PROCEDURE — 6360000002 HC RX W HCPCS: Performed by: SURGERY

## 2024-01-02 PROCEDURE — 85025 COMPLETE CBC W/AUTO DIFF WBC: CPT

## 2024-01-02 PROCEDURE — 36415 COLL VENOUS BLD VENIPUNCTURE: CPT

## 2024-01-02 PROCEDURE — C9113 INJ PANTOPRAZOLE SODIUM, VIA: HCPCS | Performed by: STUDENT IN AN ORGANIZED HEALTH CARE EDUCATION/TRAINING PROGRAM

## 2024-01-02 PROCEDURE — 99024 POSTOP FOLLOW-UP VISIT: CPT | Performed by: SURGERY

## 2024-01-02 PROCEDURE — 2580000003 HC RX 258: Performed by: STUDENT IN AN ORGANIZED HEALTH CARE EDUCATION/TRAINING PROGRAM

## 2024-01-02 PROCEDURE — 93010 ELECTROCARDIOGRAM REPORT: CPT | Performed by: INTERNAL MEDICINE

## 2024-01-02 PROCEDURE — 83735 ASSAY OF MAGNESIUM: CPT

## 2024-01-02 PROCEDURE — 6370000000 HC RX 637 (ALT 250 FOR IP): Performed by: STUDENT IN AN ORGANIZED HEALTH CARE EDUCATION/TRAINING PROGRAM

## 2024-01-02 PROCEDURE — 97165 OT EVAL LOW COMPLEX 30 MIN: CPT

## 2024-01-02 PROCEDURE — 2580000003 HC RX 258: Performed by: SURGERY

## 2024-01-02 PROCEDURE — 2500000003 HC RX 250 WO HCPCS: Performed by: STUDENT IN AN ORGANIZED HEALTH CARE EDUCATION/TRAINING PROGRAM

## 2024-01-02 PROCEDURE — 80048 BASIC METABOLIC PNL TOTAL CA: CPT

## 2024-01-02 PROCEDURE — 97535 SELF CARE MNGMENT TRAINING: CPT

## 2024-01-02 RX ORDER — DOCUSATE SODIUM 100 MG/1
100 CAPSULE, LIQUID FILLED ORAL 2 TIMES DAILY
Qty: 60 CAPSULE | Refills: 0 | Status: SHIPPED | OUTPATIENT
Start: 2024-01-02 | End: 2024-02-01

## 2024-01-02 RX ORDER — LANSOPRAZOLE 30 MG/1
30 CAPSULE, DELAYED RELEASE ORAL 2 TIMES DAILY
Qty: 180 CAPSULE | Refills: 0 | Status: SHIPPED | OUTPATIENT
Start: 2024-01-02 | End: 2024-04-01

## 2024-01-02 RX ORDER — SUCRALFATE ORAL 1 G/10ML
1 SUSPENSION ORAL 3 TIMES DAILY
Qty: 900 ML | Refills: 2 | Status: SHIPPED | OUTPATIENT
Start: 2024-01-02 | End: 2024-04-01

## 2024-01-02 RX ORDER — POLYETHYLENE GLYCOL 3350 17 G/17G
17 POWDER, FOR SOLUTION ORAL DAILY
Status: DISCONTINUED | OUTPATIENT
Start: 2024-01-02 | End: 2024-01-02 | Stop reason: HOSPADM

## 2024-01-02 RX ORDER — BISACODYL 10 MG
10 SUPPOSITORY, RECTAL RECTAL DAILY PRN
Status: DISCONTINUED | OUTPATIENT
Start: 2024-01-02 | End: 2024-01-02 | Stop reason: HOSPADM

## 2024-01-02 RX ORDER — AMOXICILLIN AND CLAVULANATE POTASSIUM 875; 125 MG/1; MG/1
1 TABLET, FILM COATED ORAL 2 TIMES DAILY
Qty: 14 TABLET | Refills: 0 | Status: SHIPPED | OUTPATIENT
Start: 2024-01-02 | End: 2024-01-09

## 2024-01-02 RX ORDER — POLYETHYLENE GLYCOL 3350 17 G/17G
17 POWDER, FOR SOLUTION ORAL DAILY PRN
Qty: 510 G | Refills: 0 | Status: SHIPPED | OUTPATIENT
Start: 2024-01-02 | End: 2024-02-01

## 2024-01-02 RX ORDER — ONDANSETRON 4 MG/1
4 TABLET, FILM COATED ORAL DAILY PRN
Qty: 30 TABLET | Refills: 0 | Status: SHIPPED | OUTPATIENT
Start: 2024-01-02

## 2024-01-02 RX ORDER — CYCLOBENZAPRINE HCL 10 MG
10 TABLET ORAL 3 TIMES DAILY PRN
Qty: 21 TABLET | Refills: 0 | Status: SHIPPED | OUTPATIENT
Start: 2024-01-02 | End: 2024-01-12

## 2024-01-02 RX ORDER — HYDROCODONE BITARTRATE AND ACETAMINOPHEN 5; 325 MG/1; MG/1
1 TABLET ORAL EVERY 6 HOURS PRN
Qty: 28 TABLET | Refills: 0 | Status: SHIPPED | OUTPATIENT
Start: 2024-01-02 | End: 2024-01-09

## 2024-01-02 RX ADMIN — SODIUM CHLORIDE, PRESERVATIVE FREE 10 ML: 5 INJECTION INTRAVENOUS at 08:36

## 2024-01-02 RX ADMIN — POLYETHYLENE GLYCOL 3350 17 G: 17 POWDER, FOR SOLUTION ORAL at 08:36

## 2024-01-02 RX ADMIN — HYDROMORPHONE HYDROCHLORIDE 0.25 MG: 1 INJECTION, SOLUTION INTRAMUSCULAR; INTRAVENOUS; SUBCUTANEOUS at 11:25

## 2024-01-02 RX ADMIN — PIPERACILLIN AND TAZOBACTAM 3375 MG: 3; .375 INJECTION, POWDER, FOR SOLUTION INTRAVENOUS at 10:05

## 2024-01-02 RX ADMIN — OXYCODONE 5 MG: 5 TABLET ORAL at 09:36

## 2024-01-02 RX ADMIN — HYDROMORPHONE HYDROCHLORIDE 0.25 MG: 1 INJECTION, SOLUTION INTRAMUSCULAR; INTRAVENOUS; SUBCUTANEOUS at 02:47

## 2024-01-02 RX ADMIN — ONDANSETRON 4 MG: 2 INJECTION INTRAMUSCULAR; INTRAVENOUS at 02:31

## 2024-01-02 RX ADMIN — SODIUM CHLORIDE, PRESERVATIVE FREE 40 MG: 5 INJECTION INTRAVENOUS at 08:36

## 2024-01-02 RX ADMIN — POTASSIUM CHLORIDE, DEXTROSE MONOHYDRATE AND SODIUM CHLORIDE: 150; 5; 450 INJECTION, SOLUTION INTRAVENOUS at 02:40

## 2024-01-02 RX ADMIN — HYDROMORPHONE HYDROCHLORIDE 0.25 MG: 1 INJECTION, SOLUTION INTRAMUSCULAR; INTRAVENOUS; SUBCUTANEOUS at 14:50

## 2024-01-02 RX ADMIN — HYDROMORPHONE HYDROCHLORIDE 0.25 MG: 1 INJECTION, SOLUTION INTRAMUSCULAR; INTRAVENOUS; SUBCUTANEOUS at 06:53

## 2024-01-02 ASSESSMENT — PAIN DESCRIPTION - LOCATION
LOCATION: ABDOMEN

## 2024-01-02 ASSESSMENT — PAIN DESCRIPTION - ORIENTATION
ORIENTATION: UPPER

## 2024-01-02 ASSESSMENT — PAIN SCALES - GENERAL
PAINLEVEL_OUTOF10: 8
PAINLEVEL_OUTOF10: 8

## 2024-01-02 NOTE — ANESTHESIA POSTPROCEDURE EVALUATION
Department of Anesthesiology  Postprocedure Note    Patient: Geoffery Lynne E Schober  MRN: [id number]  YOB: 1980  Date of evaluation: 1/1/2024    Procedure Summary       Date: 12/30/23 Room / Location: [address]    Anesthesia Start: 2347 Anesthesia Stop: 12/31/23 0217    Procedure: ROBOTIC REPAIR OF PERFORATED GASTRIC JEJUNAL ULCER, EGD Diagnosis: Gastric ulcer with perforation, unspecified chronicity (HCC)    Surgeons: Anjum Merrill DO Responsible Provider: Adeola Gloria MD    Anesthesia Type: general ASA Status: 2            Anesthesia Type: No value filed.    Bambi Phase I: Bambi Score: 9    Bambi Phase II:      Anesthesia Post Evaluation    Patient location during evaluation: floor  Patient participation: complete - patient participated  Level of consciousness: awake and alert  Airway patency: patent  Nausea & Vomiting: no nausea and no vomiting  Cardiovascular status: blood pressure returned to baseline  Respiratory status: acceptable  Hydration status: euvolemic  Comments: No known anesthesia related complication  Multimodal analgesia pain management approach  Pain management: adequate    No notable events documented.

## 2024-01-02 NOTE — CARE COORDINATION
Discharge Report    [facility]  Clinical Case Management Department  Written by: Theresa Heller RN    Patient Name: Imelda SCHWARTZ Schober  Attending Provider: No att. providers found  Admit Date: 2023  5:37 PM  MRN: [id number]  Account: [id number]                     : 1980  Discharge Date: 2024      Disposition: home    Theresa Heller RN

## 2024-01-02 NOTE — PROGRESS NOTES
CLINICAL PHARMACY NOTE: MEDS TO BEDS    Total # of Prescriptions Filled: 5   The following medications were delivered to the patient:  Augmentin  sucralafate  Norco  Flexeril  Silver sulfadiazine cream      Additional Documentation:

## 2024-01-02 NOTE — PLAN OF CARE
Problem: Discharge Planning  Goal: Discharge to home or other facility with appropriate resources  Outcome: Progressing     Problem: Pain  Goal: Verbalizes/displays adequate comfort level or baseline comfort level  Outcome: Progressing     Problem: Safety - Adult  Goal: Free from fall injury  Outcome: Progressing     Problem: Respiratory - Adult  Goal: Achieves optimal ventilation and oxygenation  Outcome: Progressing     Problem: Cardiovascular - Adult  Goal: Maintains optimal cardiac output and hemodynamic stability  Outcome: Progressing  Goal: Absence of cardiac dysrhythmias or at baseline  Outcome: Progressing     Problem: Skin/Tissue Integrity - Adult  Goal: Skin integrity remains intact  Outcome: Progressing  Goal: Incisions, wounds, or drain sites healing without S/S of infection  Outcome: Progressing     Problem: Gastrointestinal - Adult  Goal: Minimal or absence of nausea and vomiting  Outcome: Progressing  Goal: Maintains or returns to baseline bowel function  Outcome: Progressing     Problem: ABCDS Injury Assessment  Goal: Absence of physical injury  Outcome: Progressing

## 2024-01-02 NOTE — PLAN OF CARE
Problem: Discharge Planning  Goal: Discharge to home or other facility with appropriate resources  1/2/2024 1330 by Yanet Velarde RN  Outcome: Completed  Flowsheets (Taken 1/1/2024 1500)  Discharge to home or other facility with appropriate resources:   Identify barriers to discharge with patient and caregiver   Identify discharge learning needs (meds, wound care, etc)   Arrange for needed discharge resources and transportation as appropriate  1/2/2024 0512 by Winifred Nunes RN  Outcome: Progressing     Problem: Pain  Goal: Verbalizes/displays adequate comfort level or baseline comfort level  1/2/2024 1330 by Yanet Velarde RN  Outcome: Completed  Flowsheets (Taken 1/1/2024 1500)  Verbalizes/displays adequate comfort level or baseline comfort level:   Encourage patient to monitor pain and request assistance   Administer analgesics based on type and severity of pain and evaluate response   Assess pain using appropriate pain scale   Implement non-pharmacological measures as appropriate and evaluate response  1/2/2024 0512 by Winifred Nunes RN  Outcome: Progressing     Problem: Safety - Adult  Goal: Free from fall injury  1/2/2024 1330 by Yanet Velarde RN  Outcome: Completed  Flowsheets (Taken 1/1/2024 1500)  Free From Fall Injury: Instruct family/caregiver on patient safety  1/2/2024 0512 by Winifred Nunes RN  Outcome: Progressing     Problem: Respiratory - Adult  Goal: Achieves optimal ventilation and oxygenation  1/2/2024 1330 by Yanet Velarde RN  Outcome: Completed  Flowsheets (Taken 1/1/2024 1500)  Achieves optimal ventilation and oxygenation:   Assess for changes in respiratory status   Assess for changes in mentation and behavior   Respiratory therapy support as indicated  1/2/2024 0512 by Winifred Nunes RN  Outcome: Progressing     Problem: Cardiovascular - Adult  Goal: Maintains optimal cardiac output and hemodynamic stability  1/2/2024 1330 by Yanet Velarde RN  Outcome:  bowel function   Encourage oral fluids to ensure adequate hydration   Administer ordered medications as needed   Encourage mobilization and activity  1/2/2024 0512 by Winifrde Nunes, RN  Outcome: Progressing     Problem: ABCDS Injury Assessment  Goal: Absence of physical injury  1/2/2024 1330 by Yanet Velarde, RN  Outcome: Completed  Flowsheets (Taken 1/1/2024 1500)  Absence of Physical Injury: Implement safety measures based on patient assessment  1/2/2024 0512 by Winifred Nunes RN  Outcome: Progressing

## 2024-01-02 NOTE — DISCHARGE INSTRUCTIONS
Discharge Instructions for Bariatric Surgery     You had a robotic repair of perforated gastrojejunal ulcer     Home Care     It is important to keep the incisions clean and dry to promote healing.   Shower with soap and rinse and dry thoroughly.  If incisions are oozing, you may cover with clean gauze.  Use the incentive spirometer every couple of hours. This is to make sure you are breathing deeply and keeping the air sacs within your lungs as open as possible to prevent respiratory problems.      Diet    Clear liquid diet until 1/4/2023, then full liquid diet until seen by Dr. Merrill    Physical Activity    When home, we recommend that you take frequent walks, as tolerated, to prevent complications and increase your endurance.   Ask your doctor when you will be able to return to work. You may need to wait 2-6 weeks.    Do not drive unless you are no longer using pain medications  Do not lift anything over ten pounds for 4 weeks.      Medications    Remember to avoid aspirin, aspirin-containing products, and nonsteroidal anti-inflammatory drugs (NSAIDs, such as ibuprofen, naproxen, etc.).   If you were taking these medications before the procedure, and had to stop, ask your doctor when you can resume taking them. Be sure to review your medications with your primary care provider at your follow up office visit.    Lifestyle Changes    Changing your diet and level of activity are the biggest lifestyle changes associated with your success. Be aware that you may have emotional ups and downs after this surgery.           Follow-up   Follow up with your primary care physician in one week.   Follow up with Dr. Merrill in 1 week. If you don't already have a scheduled  appointment, please call the office at [phone].    Call Your Doctor If Any of the Following Occurs   Monitor your recovery once you leave the hospital. If any of the following occur, call your doctor:   Signs of infection, including fever above

## 2024-01-02 NOTE — PROGRESS NOTES
Bariatric Surgery Progress Note          PATIENT NAME: Geoffery Lynne E Schober     TODAY'S DATE: 1/2/2024, 7:51 AM    SUBJECTIVE:    Patient was seen and evaluated at bedside.  Afebrile, vital signs normal limits.  Has some left upper quadrant abdominal pain.  Has been ambulating.  Voiding spontaneously.  Negative bowel movement and flatus.  MALATHI drain with 375 serous output     OBJECTIVE:   VITALS:  BP (!) 99/54   Pulse 76   Temp 98.1 °F (36.7 °C) (Oral)   Resp 20   Ht 1.626 m (5' 4\")   Wt 90.6 kg (199 lb 11.8 oz)   SpO2 99%   BMI 34.28 kg/m²      INTAKE/OUTPUT:      Intake/Output Summary (Last 24 hours) at 1/2/2024 0751  Last data filed at 1/2/2024 0303  Gross per 24 hour   Intake --   Output 325 ml   Net -325 ml       CONSTITUTIONAL:  NAD, A&O x 3  HEENT: EOMI, moist mucous membranes  LUNGS:  normal effort with symmetric rise and fall of chest wall, no accessory muscle use, no wheezing   CARDIOVASCULAR:  regular rate and rhythm  ABDOMEN: Soft, nondistended, appropriately TTP, incision with dressing CDI, MALATHI drain with SS output   EXTREMITIES: no rashes, lesions, edema.    Data:  CBC:   Lab Results   Component Value Date/Time    WBC 5.4 01/02/2024 03:02 AM    RBC 2.92 01/02/2024 03:02 AM    HGB 8.1 01/02/2024 03:02 AM    HCT 26.9 01/02/2024 03:02 AM    MCV 92.1 01/02/2024 03:02 AM    MCH 27.7 01/02/2024 03:02 AM    MCHC 30.1 01/02/2024 03:02 AM    RDW 11.9 01/02/2024 03:02 AM     01/02/2024 03:02 AM    MPV 10.5 01/02/2024 03:02 AM     BMP:    Lab Results   Component Value Date/Time     01/01/2024 06:11 AM    K 4.0 01/01/2024 06:11 AM     01/01/2024 06:11 AM    CO2 24 01/01/2024 06:11 AM    BUN 7 01/01/2024 06:11 AM    LABALBU 4.2 12/30/2023 01:39 PM    CREATININE 0.5 01/01/2024 06:11 AM    CALCIUM 8.7 01/01/2024 06:11 AM    GFRAA >60 06/08/2022 06:02 AM    LABGLOM >60 01/01/2024 06:11 AM    GLUCOSE 131 01/01/2024 06:11 AM     ASSESSMENT   Patient Active Problem List   Diagnosis    History

## 2024-01-02 NOTE — PROGRESS NOTES
Occupational Therapy  Facility/Department: [address]  Occupational Therapy Initial Assessment    Name: Geoffery Lynne E Schober  : 1980  MRN: [id number]  Date of Service: 2024  Chief Complaint   Patient presents with    Perforated ulcer     Free air noted on CT at [facility]       Discharge Recommendations: No therapy recommended at discharge.  Defer OT at this time           Patient Diagnosis(es): The encounter diagnosis was Chronic marginal ulcer with perforation (HCC).  Past Medical History:  has a past medical history of Back pain, Depression, Migraines, and Sleep apnea.  Past Surgical History:  has a past surgical history that includes Partial hysterectomy; Upper gastrointestinal endoscopy (N/A, 2018); Hysterectomy; Heaven-en-Y Gastric Bypass (N/A, 2019); Cholecystectomy, laparoscopic (2022); Cholecystectomy, laparoscopic (N/A, 2022); Colonoscopy (N/A, 2022); and Stomach surgery (N/A, 2023).       Assessment   Prognosis: Good  Decision Making: Low Complexity  No Skilled OT: Independent with functional mobility;Independent with ADL's;At baseline function;Safe to return home  REQUIRES OT FOLLOW-UP: No  Activity Tolerance  Activity Tolerance: Patient Tolerated treatment well;Patient limited by pain        Restrictions  Restrictions/Precautions  Restrictions/Precautions: General Precautions, Up as Tolerated  Required Braces or Orthoses?: Yes  Required Braces or Orthoses  Other: Abdominal Binder  Position Activity Restriction  Other position/activity restrictions: Amb pt    Subjective   General  Patient assessed for rehabilitation services?: Yes  Family / Caregiver Present: No  Diagnosis: Acute jejunal ulcer, hx of gastric bypass sx  Subjective  Subjective: 7/10 Abd pain per pt, writer increased pt's activity and distraction, pt very cooperative     Social/Functional History  Social/Functional History  Lives With: Spouse, Son (18 yo son)  Type of Home: House  Home Layout:  binder at mod I with increased time, pt stood and demo'd toilet transfer without use of bars to simulate this ADL activity, while standing pt was able to bend at knees and  object from floor, pt also performed IS x3 successfully while standing  Skin Care: Soap and water            Vision  Vision: Impaired  Vision Exceptions: Wears glasses at all times  Hearing  Hearing: Within functional limits  Cognition  Overall Cognitive Status: WFL  Orientation  Overall Orientation Status: Within Functional Limits  Orientation Level: Oriented X4      Education Given To: Patient  Education Provided: Role of Therapy;Plan of Care;ADL Adaptive Strategies  Education Method: Verbal  Barriers to Learning: Vision  Education Outcome: Verbalized understanding;Demonstrated understanding    AM-PAC - ADL  AM-PAC Daily Activity - Inpatient   How much help is needed for putting on and taking off regular lower body clothing?: None  How much help is needed for bathing (which includes washing, rinsing, drying)?: None  How much help is needed for toileting (which includes using toilet, bedpan, or urinal)?: None  How much help is needed for putting on and taking off regular upper body clothing?: None  How much help is needed for taking care of personal grooming?: None  How much help for eating meals?: None  AM-PAC Inpatient Daily Activity Raw Score: 24  AM-PAC Inpatient ADL T-Scale Score : 57.54  ADL Inpatient CMS 0-100% Score: 0  ADL Inpatient CMS G-Code Modifier : CH       Goals  Short Term Goals  Time Frame for Short Term Goals: OT eval and d/c d/t pt (I)       Therapy Time   Individual Concurrent Group Co-treatment   Time In 1045         Time Out 1101         Minutes 16         Timed Code Treatment Minutes: 14 Minutes       ROSALINA George/L

## 2024-01-02 NOTE — CARE COORDINATION
Transitional planning-talked with patient. Plan is to go home with her . Has ride. Not wanting any home care at this time

## 2024-01-03 PROBLEM — K28.5 CHRONIC MARGINAL ULCER WITH PERFORATION (HCC): Status: ACTIVE | Noted: 2024-01-03

## 2024-01-04 LAB
MICROORGANISM SPEC CULT: NORMAL
MICROORGANISM SPEC CULT: NORMAL
SERVICE CMNT-IMP: NORMAL
SERVICE CMNT-IMP: NORMAL
SPECIMEN DESCRIPTION: NORMAL
SPECIMEN DESCRIPTION: NORMAL

## 2024-01-04 NOTE — DISCHARGE SUMMARY
BARIATRIC SURGERY DISCHARGE SUMMARY    Disposition: DISCHARGE TO HOME    PATIENT NAME: Geoffery Lynne E Schober    YOB: 1980  MEDICAL RECORD NO. [id number]  DATE: 1/4/2024  ADMITTING PHYSICIAN: DR. SALDANA  PRIMARY CARE PHYSICIAN: Germain Aceves MD  ADMIT DATE: 12/30/2023  DISCHARGE DATE:  1/2/2024  4:09 PM  DISPOSITION: to HOME  ADMITTING DIAGNOSIS:   1. Chronic marginal ulcer with perforation (HCC)    2. Gastrojejunal ulcer with perforation (HCC)      DISCHARGE DIAGNOSIS:   Patient Active Problem List   Diagnosis Code    History of depression Z86.59    CURLY (obstructive sleep apnea) G47.33    Vitamin D deficiency E55.9    Status post gastric bypass for obesity Z98.84    Obesity (BMI 30-39.9) E66.9    Abdominal pain R10.9    S/P total abdominal hysterectomy (2016) Z90.710    Pelvic mass R19.00    Chronic cholecystitis with calculus K80.10    Epigastric abdominal pain R10.13    Abdominal pain, epigastric R10.13    S/P cholecystectomy Z90.49    Acute marginal ulcer K28.3    Gastrojejunal ulcer with perforation (HCC) K28.5    Chronic marginal ulcer with perforation (HCC) K28.5     CONSULTANTS:  none    PROCEDURES / DIAGNOSTIC TESTS:   1. Robotic assisted laparoscopic Repair perforated Gastrojejunal ulcer with EGD    HOSPITAL COURSE     Geoffery Lynne E Schober is a 43 year old female who presented with perforated GJ ulcer.  She underwent robotic repair of the ulcer.      Post-operatively pt's pain was controlled w/ IV pain medications and transitioned to PO as tolerated. Bariatric clear liquids were started postoperatively and continued through discharge. Pt was ambulatory w/o assistance.     At time of discharge, Geoffery Lynne E Schober was tolerating a bariatric liquid diet, ambulating on own accord, had adequate analgesia on oral pain medications, and had no signs of symptoms of complications.  Patient was deemed medically stable and was DC to home on 1/2/2024  4:09 PM w/ instructions to f/u in office

## 2024-01-05 ENCOUNTER — OFFICE VISIT (OUTPATIENT)
Dept: BARIATRICS/WEIGHT MGMT | Age: 44
End: 2024-01-05

## 2024-01-05 VITALS
DIASTOLIC BLOOD PRESSURE: 68 MMHG | BODY MASS INDEX: 31.07 KG/M2 | OXYGEN SATURATION: 98 % | HEIGHT: 64 IN | HEART RATE: 72 BPM | WEIGHT: 182 LBS | SYSTOLIC BLOOD PRESSURE: 118 MMHG

## 2024-01-05 DIAGNOSIS — K28.9 GASTROJEJUNAL ULCER: ICD-10-CM

## 2024-01-05 DIAGNOSIS — K90.89 OTHER SPECIFIED INTESTINAL MALABSORPTION: ICD-10-CM

## 2024-01-05 DIAGNOSIS — Z98.84 STATUS POST GASTRIC BYPASS FOR OBESITY: Primary | ICD-10-CM

## 2024-01-05 PROCEDURE — 99024 POSTOP FOLLOW-UP VISIT: CPT | Performed by: SURGERY

## 2024-01-05 RX ORDER — CYCLOBENZAPRINE HCL 10 MG
10 TABLET ORAL 3 TIMES DAILY PRN
Qty: 21 TABLET | Refills: 0 | Status: SHIPPED | OUTPATIENT
Start: 2024-01-05 | End: 2024-01-15

## 2024-01-05 NOTE — PROGRESS NOTES
[facility] INVASIVE BARIATRIC SURG  [address]  [address]  [place] [id number]  Dept: [phone]    SURGICAL WEIGHT LOSS MANAGEMENT PROGRAM  PROGRESS NOTE     CC: Weight Loss    Patient: Geoffery Lynne E Schober      Service Date: 1/5/2024  Visit:  1 week    Medical Record #: [id number]  Date of Surgery:   12/30/2023    History: 43 y.o. female who presents today for a post op follow up after perforated GJ ulcer repair. She also has a history of gastric bypass done on 4/16/2019. Patient reports watery bowel movements. Her nausea has been well controlled with taking zofran. She denies vomiting, fever, and chills. She states the pain is well controlled with taking flexeril.  She had been vaping nicotine.      Height: 1.626 m (5' 4\")  Highest Weight:   280 lbs      Current Visit Weight Information  Weight: 82.6 kg (182 lb)   BMI: Body mass index is 31.24 kg/m².    Weight loss since surgery:  98 lbs    1 wk - D/C'd home on VTE Prohylaxis?   [x] Yes   [] No   On VTE Proph as directed?   [x] Yes   [] No    Liver pathology:    [] NA    [] No Gross path    [] Liver Steatosis   [x] Discussed w/ pt   [] Referred to GI    Exercising?   [] Yes    [x] No     Review of Systems:     General  Negative for: [] Weight Change   [x] Fatigue   [x] Fevers & Chills    [] Appetite change [] Other:    Positive for: [x] Weight Change   [] Fatigue   [] Fevers & Chills    [] Appetite change [] Other:   Cardiac  Negative for: [x] Chest Pain   [] Difficulty Breathing   [] Leg Cramps [x] Edema of Lower Extremeties    [] Left   [] Right      Positive for: [] Chest Pain   [] Difficulty Breathing   [] Leg Cramps [] Edema of Lower Extremeties    [] Left   [] Right   Pulmonary  Negative for: [x] Shortness of Breath [] Wheeze [x] Cough  [x] Calf Pain     Positive for: [] Shortness of Breath [] Wheeze [] Cough  [] Calf Pain   Gastro-Intestinal Negative for: [] Heartburn   [] Reflux   []

## 2024-02-05 RX ORDER — LANSOPRAZOLE 30 MG/1
30 CAPSULE, DELAYED RELEASE ORAL 2 TIMES DAILY
Qty: 180 CAPSULE | Refills: 0 | Status: SHIPPED | OUTPATIENT
Start: 2024-02-05 | End: 2024-05-05

## 2024-02-05 RX ORDER — SUCRALFATE ORAL 1 G/10ML
1 SUSPENSION ORAL 3 TIMES DAILY
Qty: 900 ML | Refills: 2 | Status: SHIPPED | OUTPATIENT
Start: 2024-02-05 | End: 2024-05-05

## 2024-02-07 ENCOUNTER — TELEPHONE (OUTPATIENT)
Dept: BARIATRICS/WEIGHT MGMT | Age: 44
End: 2024-02-07

## 2024-02-08 ENCOUNTER — OFFICE VISIT (OUTPATIENT)
Dept: BARIATRICS/WEIGHT MGMT | Age: 44
End: 2024-02-08

## 2024-02-08 VITALS
HEART RATE: 80 BPM | BODY MASS INDEX: 28.85 KG/M2 | DIASTOLIC BLOOD PRESSURE: 80 MMHG | SYSTOLIC BLOOD PRESSURE: 120 MMHG | HEIGHT: 64 IN | WEIGHT: 169 LBS

## 2024-02-08 DIAGNOSIS — K90.89 OTHER SPECIFIED INTESTINAL MALABSORPTION: Primary | ICD-10-CM

## 2024-02-08 DIAGNOSIS — Z98.84 S/P GASTRIC BYPASS: ICD-10-CM

## 2024-02-08 DIAGNOSIS — Z87.11 HX OF GASTRIC ULCER: ICD-10-CM

## 2024-02-08 PROCEDURE — 99024 POSTOP FOLLOW-UP VISIT: CPT | Performed by: SURGERY

## 2024-02-08 RX ORDER — LISDEXAMFETAMINE DIMESYLATE 30 MG/1
30 CAPSULE ORAL EVERY MORNING
COMMUNITY
Start: 2024-02-07

## 2024-02-08 RX ORDER — DOCUSATE SODIUM 100 MG/1
100 CAPSULE, LIQUID FILLED ORAL 2 TIMES DAILY
Qty: 180 CAPSULE | Refills: 0 | Status: SHIPPED | OUTPATIENT
Start: 2024-02-08 | End: 2024-05-08

## 2024-02-08 RX ORDER — SUCRALFATE 1 G/1
1 TABLET ORAL 3 TIMES DAILY
Qty: 120 TABLET | Refills: 3 | Status: SHIPPED | OUTPATIENT
Start: 2024-02-08

## 2024-02-08 RX ORDER — PANTOPRAZOLE SODIUM 40 MG/1
40 TABLET, DELAYED RELEASE ORAL DAILY
COMMUNITY
Start: 2024-02-03

## 2024-02-08 NOTE — PROGRESS NOTES
[facility] INVASIVE BARIATRIC SURG  [address]  [address]  [place] [id number]  Dept: [phone]    SURGICAL WEIGHT LOSS MANAGEMENT PROGRAM  PROGRESS NOTE     CC: Weight Loss    Patient: Geoffery Lynne E Schober      Service Date: 2/8/2024  Visit:   Follow up    Medical Record #: [id number]       History: 43 y.o. female who presents today for a GJ ulcer follow up. She is also s/p gastric bypass on 4/16/2019. She states she is doing well. She has been compliant with taking her daily vitamins. She does note constipation with taking carafate, which she has been managing with dulcolax. She denies nausea, vomiting, abdominal pain, fever, and chills. Denies nicotine, alcohol, or caffeine use.     Height: 1.626 m (5' 4\")  Highest Weight:   280 lbs    Current Visit Weight Information  Weight: 76.7 kg (169 lb)   BMI: Body mass index is 29.01 kg/m².    Weight loss since surgery:  111 lbs    1 wk - D/C'd home on VTE Prohylaxis?   [x] Yes   [] No   On VTE Proph as directed?   [x] Yes   [] No    Exercising?   [x] Yes    [] No     Review of Systems:     General  Negative for: [] Weight Change   [x] Fatigue   [x] Fevers & Chills    [] Appetite change [] Other:    Positive for: [x] Weight Change   [] Fatigue   [] Fevers & Chills    [] Appetite change [] Other:   Cardiac  Negative for: [x] Chest Pain   [] Difficulty Breathing   [] Leg Cramps [x] Edema of Lower Extremeties    [] Left   [] Right      Positive for: [] Chest Pain   [] Difficulty Breathing   [] Leg Cramps [] Edema of Lower Extremeties    [] Left   [] Right   Pulmonary  Negative for: [x] Shortness of Breath [] Wheeze [x] Cough  [x] Calf Pain     Positive for: [] Shortness of Breath [] Wheeze [] Cough  [] Calf Pain   Gastro-Intestinal Negative for: [] Heartburn   [] Reflux   [] Dysphagia   [] Melena   [] BRBPR  [x] Vomiting   [x] Abdominal Pain   [] Diarrhea     [] Constipation  [] Other:     Positive for: []

## 2024-02-23 ENCOUNTER — TELEPHONE (OUTPATIENT)
Dept: BARIATRICS/WEIGHT MGMT | Age: 44
End: 2024-02-23

## 2024-02-23 NOTE — TELEPHONE ENCOUNTER
Hello,    You are schedule for an EGD on 03/01/24 at the [facility]. Please arrive at 5:30. Nothing to eat or drink starting at midnight, stop blood thinners 7 days prior and you will need a  with you.      [facility] :  [id number] [place] [address], [place] [address]      Any questions , concerns or need to cancel, please call [phone].    Thank you,    [facility] Weight Management.

## 2024-02-29 ENCOUNTER — ANESTHESIA EVENT (OUTPATIENT)
Dept: OPERATING ROOM | Age: 44
End: 2024-02-29
Payer: COMMERCIAL

## 2024-03-01 ENCOUNTER — HOSPITAL ENCOUNTER (OUTPATIENT)
Age: 44
Setting detail: OUTPATIENT SURGERY
Discharge: HOME | End: 2024-03-01
Attending: SURGERY | Admitting: SURGERY
Payer: COMMERCIAL

## 2024-03-01 ENCOUNTER — ANESTHESIA (OUTPATIENT)
Dept: OPERATING ROOM | Age: 44
End: 2024-03-01
Payer: COMMERCIAL

## 2024-03-01 VITALS
TEMPERATURE: 97.3 F | OXYGEN SATURATION: 100 % | SYSTOLIC BLOOD PRESSURE: 104 MMHG | BODY MASS INDEX: 28 KG/M2 | WEIGHT: 164 LBS | DIASTOLIC BLOOD PRESSURE: 76 MMHG | RESPIRATION RATE: 16 BRPM | HEIGHT: 64 IN | HEART RATE: 79 BPM

## 2024-03-01 DIAGNOSIS — K21.9 GASTROESOPHAGEAL REFLUX DISEASE, UNSPECIFIED WHETHER ESOPHAGITIS PRESENT: ICD-10-CM

## 2024-03-01 PROCEDURE — 2500000003 HC RX 250 WO HCPCS: Performed by: NURSE ANESTHETIST, CERTIFIED REGISTERED

## 2024-03-01 PROCEDURE — 2580000003 HC RX 258: Performed by: ANESTHESIOLOGY

## 2024-03-01 PROCEDURE — 2709999900 HC NON-CHARGEABLE SUPPLY: Performed by: SURGERY

## 2024-03-01 PROCEDURE — 6360000002 HC RX W HCPCS: Performed by: NURSE ANESTHETIST, CERTIFIED REGISTERED

## 2024-03-01 PROCEDURE — 43239 EGD BIOPSY SINGLE/MULTIPLE: CPT | Performed by: SURGERY

## 2024-03-01 PROCEDURE — 7100000011 HC PHASE II RECOVERY - ADDTL 15 MIN: Performed by: SURGERY

## 2024-03-01 PROCEDURE — 88305 TISSUE EXAM BY PATHOLOGIST: CPT

## 2024-03-01 PROCEDURE — 7100000010 HC PHASE II RECOVERY - FIRST 15 MIN: Performed by: SURGERY

## 2024-03-01 PROCEDURE — 3609012400 HC EGD TRANSORAL BIOPSY SINGLE/MULTIPLE: Performed by: SURGERY

## 2024-03-01 PROCEDURE — 3700000000 HC ANESTHESIA ATTENDED CARE: Performed by: SURGERY

## 2024-03-01 RX ORDER — SODIUM CHLORIDE 9 MG/ML
INJECTION, SOLUTION INTRAVENOUS PRN
Status: DISCONTINUED | OUTPATIENT
Start: 2024-03-01 | End: 2024-03-01 | Stop reason: HOSPADM

## 2024-03-01 RX ORDER — SODIUM CHLORIDE 9 MG/ML
INJECTION, SOLUTION INTRAVENOUS PRN
Status: CANCELLED | OUTPATIENT
Start: 2024-03-01

## 2024-03-01 RX ORDER — PROPOFOL 10 MG/ML
INJECTION, EMULSION INTRAVENOUS PRN
Status: DISCONTINUED | OUTPATIENT
Start: 2024-03-01 | End: 2024-03-01 | Stop reason: SDUPTHER

## 2024-03-01 RX ORDER — IPRATROPIUM BROMIDE AND ALBUTEROL SULFATE 2.5; .5 MG/3ML; MG/3ML
1 SOLUTION RESPIRATORY (INHALATION)
Status: CANCELLED | OUTPATIENT
Start: 2024-03-01 | End: 2024-03-02

## 2024-03-01 RX ORDER — LIDOCAINE HYDROCHLORIDE 10 MG/ML
1 INJECTION, SOLUTION EPIDURAL; INFILTRATION; INTRACAUDAL; PERINEURAL
Status: DISCONTINUED | OUTPATIENT
Start: 2024-03-01 | End: 2024-03-01 | Stop reason: HOSPADM

## 2024-03-01 RX ORDER — ONDANSETRON 2 MG/ML
4 INJECTION INTRAMUSCULAR; INTRAVENOUS
Status: CANCELLED | OUTPATIENT
Start: 2024-03-01 | End: 2024-03-02

## 2024-03-01 RX ORDER — SODIUM CHLORIDE, SODIUM LACTATE, POTASSIUM CHLORIDE, CALCIUM CHLORIDE 600; 310; 30; 20 MG/100ML; MG/100ML; MG/100ML; MG/100ML
INJECTION, SOLUTION INTRAVENOUS CONTINUOUS
Status: DISCONTINUED | OUTPATIENT
Start: 2024-03-01 | End: 2024-03-01 | Stop reason: HOSPADM

## 2024-03-01 RX ORDER — LIDOCAINE HYDROCHLORIDE 10 MG/ML
INJECTION, SOLUTION INFILTRATION; PERINEURAL PRN
Status: DISCONTINUED | OUTPATIENT
Start: 2024-03-01 | End: 2024-03-01 | Stop reason: SDUPTHER

## 2024-03-01 RX ORDER — SODIUM CHLORIDE 0.9 % (FLUSH) 0.9 %
5-40 SYRINGE (ML) INJECTION EVERY 12 HOURS SCHEDULED
Status: DISCONTINUED | OUTPATIENT
Start: 2024-03-01 | End: 2024-03-01 | Stop reason: HOSPADM

## 2024-03-01 RX ORDER — SODIUM CHLORIDE 0.9 % (FLUSH) 0.9 %
5-40 SYRINGE (ML) INJECTION PRN
Status: DISCONTINUED | OUTPATIENT
Start: 2024-03-01 | End: 2024-03-01 | Stop reason: HOSPADM

## 2024-03-01 RX ORDER — SODIUM CHLORIDE 0.9 % (FLUSH) 0.9 %
5-40 SYRINGE (ML) INJECTION PRN
Status: CANCELLED | OUTPATIENT
Start: 2024-03-01

## 2024-03-01 RX ORDER — HYDRALAZINE HYDROCHLORIDE 20 MG/ML
10 INJECTION INTRAMUSCULAR; INTRAVENOUS
Status: CANCELLED | OUTPATIENT
Start: 2024-03-01

## 2024-03-01 RX ORDER — METOCLOPRAMIDE HYDROCHLORIDE 5 MG/ML
10 INJECTION INTRAMUSCULAR; INTRAVENOUS
Status: CANCELLED | OUTPATIENT
Start: 2024-03-01 | End: 2024-03-02

## 2024-03-01 RX ORDER — LABETALOL HYDROCHLORIDE 5 MG/ML
10 INJECTION, SOLUTION INTRAVENOUS
Status: CANCELLED | OUTPATIENT
Start: 2024-03-01

## 2024-03-01 RX ORDER — GLYCOPYRROLATE 0.2 MG/ML
INJECTION INTRAMUSCULAR; INTRAVENOUS PRN
Status: DISCONTINUED | OUTPATIENT
Start: 2024-03-01 | End: 2024-03-01 | Stop reason: SDUPTHER

## 2024-03-01 RX ORDER — MORPHINE SULFATE 2 MG/ML
2 INJECTION, SOLUTION INTRAMUSCULAR; INTRAVENOUS EVERY 5 MIN PRN
Status: CANCELLED | OUTPATIENT
Start: 2024-03-01

## 2024-03-01 RX ORDER — MIDAZOLAM HYDROCHLORIDE 2 MG/2ML
2 INJECTION, SOLUTION INTRAMUSCULAR; INTRAVENOUS
Status: CANCELLED | OUTPATIENT
Start: 2024-03-01 | End: 2024-03-02

## 2024-03-01 RX ORDER — MEPERIDINE HYDROCHLORIDE 50 MG/ML
12.5 INJECTION INTRAMUSCULAR; INTRAVENOUS; SUBCUTANEOUS EVERY 5 MIN PRN
Status: CANCELLED | OUTPATIENT
Start: 2024-03-01

## 2024-03-01 RX ORDER — DIPHENHYDRAMINE HYDROCHLORIDE 50 MG/ML
12.5 INJECTION INTRAMUSCULAR; INTRAVENOUS
Status: CANCELLED | OUTPATIENT
Start: 2024-03-01 | End: 2024-03-02

## 2024-03-01 RX ORDER — SODIUM CHLORIDE 0.9 % (FLUSH) 0.9 %
5-40 SYRINGE (ML) INJECTION EVERY 12 HOURS SCHEDULED
Status: CANCELLED | OUTPATIENT
Start: 2024-03-01

## 2024-03-01 RX ADMIN — SODIUM CHLORIDE, POTASSIUM CHLORIDE, SODIUM LACTATE AND CALCIUM CHLORIDE: 600; 310; 30; 20 INJECTION, SOLUTION INTRAVENOUS at 06:41

## 2024-03-01 RX ADMIN — LIDOCAINE HYDROCHLORIDE 50 MG: 10 INJECTION, SOLUTION INFILTRATION; PERINEURAL at 07:34

## 2024-03-01 RX ADMIN — GLYCOPYRROLATE 0.4 MG: 0.2 INJECTION INTRAMUSCULAR; INTRAVENOUS at 07:34

## 2024-03-01 RX ADMIN — PROPOFOL 50 MG: 10 INJECTION, EMULSION INTRAVENOUS at 07:37

## 2024-03-01 RX ADMIN — PROPOFOL 100 MG: 10 INJECTION, EMULSION INTRAVENOUS at 07:34

## 2024-03-01 ASSESSMENT — PAIN - FUNCTIONAL ASSESSMENT: PAIN_FUNCTIONAL_ASSESSMENT: NONE - DENIES PAIN

## 2024-03-01 ASSESSMENT — LIFESTYLE VARIABLES: SMOKING_STATUS: 1

## 2024-03-01 NOTE — ANESTHESIA POSTPROCEDURE EVALUATION
Department of Anesthesiology  Postprocedure Note    Patient: Geoffery Lynne E Schober  MRN: [id number]  YOB: 1980  Date of evaluation: 3/1/2024    Procedure Summary       Date: 03/01/24 Room / Location: [facility] PROCEDURE ROOM / [facility]    Anesthesia Start: 0730 Anesthesia Stop: 0743    Procedure: ESOPHAGOGASTRODUODENOSCOPY BIOPSY Diagnosis:       Gastroesophageal reflux disease, unspecified whether esophagitis present      BMI 20.0-20.9, adult      (Gastroesophageal reflux disease, unspecified whether esophagitis present [K21.9])      (BMI 20.0-20.9, adult [Z68.20])    Surgeons: Anjum Merrill DO Responsible Provider: Amelie Killian MD    Anesthesia Type: MAC ASA Status: 2            Anesthesia Type: No value filed.    Bambi Phase I: Bambi Score: 9    Bambi Phase II:      Anesthesia Post Evaluation    Patient location during evaluation: PACU  Patient participation: complete - patient participated  Level of consciousness: awake and alert  Airway patency: patent  Nausea & Vomiting: no vomiting and no nausea  Cardiovascular status: hemodynamically stable  Respiratory status: acceptable, room air and spontaneous ventilation  Hydration status: stable  Multimodal analgesia pain management approach  Pain management: adequate    No notable events documented.

## 2024-03-01 NOTE — ANESTHESIA PRE PROCEDURE
chloride flush 0.9 % injection 5-40 mL  5-40 mL IntraVENous 2 times per day Reyes Davis MD       • sodium chloride flush 0.9 % injection 5-40 mL  5-40 mL IntraVENous PRN Reyes Davis MD       • 0.9 % sodium chloride infusion   IntraVENous PRN Reyes Davis MD           Allergies:  No Known Allergies    Problem List:    Patient Active Problem List   Diagnosis Code   • History of depression Z86.59   • CURLY (obstructive sleep apnea) G47.33   • Vitamin D deficiency E55.9   • Status post gastric bypass for obesity Z98.84   • Obesity (BMI 30-39.9) E66.9   • Abdominal pain R10.9   • S/P total abdominal hysterectomy (2016) Z90.710   • Pelvic mass R19.00   • Chronic cholecystitis with calculus K80.10   • Epigastric abdominal pain R10.13   • Abdominal pain, epigastric R10.13   • S/P cholecystectomy Z90.49   • Acute marginal ulcer K28.3   • Gastrojejunal ulcer with perforation (HCC) K28.5   • Chronic marginal ulcer with perforation (HCC) K28.5       Past Medical History:        Diagnosis Date   • Back pain    • Depression    • Migraines    • Sleep apnea        Past Surgical History:        Procedure Laterality Date   • CHOLECYSTECTOMY, LAPAROSCOPIC  06/07/2022    CHOLECYSTECTOMY LAPAROSCOPIC ROBOTIC, EGD, CLOSURE OF MESENTERIC DEFECT   • CHOLECYSTECTOMY, LAPAROSCOPIC N/A 06/07/2022    CHOLECYSTECTOMY LAPAROSCOPIC ROBOTIC, EGD, CLOSURE OF MESENTERIC DEFECT performed by Anjum Merrill DO at [facility] OR   • COLONOSCOPY N/A 07/01/2022    COLONOSCOPY WITH BIOPSY performed by Jim Carroll MD at [facility] OR   • HYSTERECTOMY (CERVIX STATUS UNKNOWN)     • PARTIAL HYSTERECTOMY (CERVIX NOT REMOVED)     • BRIAN-EN-Y GASTRIC BYPASS N/A 04/16/2019    XI ROBOTIC LAPAROSCOPIC GASTRIC BYPASS BRIAN-EN-Y, LIVER BIOPSY, EGD - GI SCHEDULED performed by Anjum Merrlil DO at [facility] OR   • BRIAN-EN-Y GASTRIC BYPASS N/A 12/30/2023    ROBOTIC REPAIR OF PERFORATED GASTRIC JEJUNAL ULCER, EGD performed by Anjum Merrill DO at

## 2024-03-01 NOTE — H&P
Subjective     The patient is a 43 y.o. female who is here to undergo EGD for ulcer       Past Medical History:   Diagnosis Date    Back pain     Depression     Migraines     Sleep apnea    .    Review of Systems - A complete 14 point review of systems was performed.  All was negative unless otherwise documented in HPI.    Allergies:  No Known Allergies    Past Surgical History:  Past Surgical History:   Procedure Laterality Date    CHOLECYSTECTOMY, LAPAROSCOPIC  06/07/2022    CHOLECYSTECTOMY LAPAROSCOPIC ROBOTIC, EGD, CLOSURE OF MESENTERIC DEFECT    CHOLECYSTECTOMY, LAPAROSCOPIC N/A 06/07/2022    CHOLECYSTECTOMY LAPAROSCOPIC ROBOTIC, EGD, CLOSURE OF MESENTERIC DEFECT performed by Anjum Merrill DO at [facility] OR    COLONOSCOPY N/A 07/01/2022    COLONOSCOPY WITH BIOPSY performed by Jim Carroll MD at [facility] OR    HYSTERECTOMY (CERVIX STATUS UNKNOWN)      PARTIAL HYSTERECTOMY (CERVIX NOT REMOVED)      BRIAN-EN-Y GASTRIC BYPASS N/A 04/16/2019    XI ROBOTIC LAPAROSCOPIC GASTRIC BYPASS BRIAN-EN-Y, LIVER BIOPSY, EGD - GI SCHEDULED performed by Anjum Merrill DO at [facility] OR    BRIAN-EN-Y GASTRIC BYPASS N/A 12/30/2023    ROBOTIC REPAIR OF PERFORATED GASTRIC JEJUNAL ULCER, EGD performed by Anjum Merrill DO at [facility] OR    STOMACH SURGERY N/A 12/31/2023    ROBOTIC REPAIR OF PERFORATED GASTRIC JEJUNAL ULCER, EGD    UPPER GASTROINTESTINAL ENDOSCOPY N/A 11/23/2018    EGD BIOPSY performed by Anjum Merrill DO at [facility] Endoscopy       Family History:  Family History   Problem Relation Age of Onset    Arthritis Mother     Depression Mother     Heart Disease Father     Cancer Father        Social History:  Social History     Socioeconomic History    Marital status:      Spouse name: Not on file    Number of children: Not on file    Years of education: Not on file    Highest education level: Not on file   Occupational History    Not on file   Tobacco Use    Smoking status: Never    Smokeless tobacco: Never    Vaping Use    Vaping Use: Never used   Substance and Sexual Activity    Alcohol use: Yes     Comment: Social    Drug use: Yes     Types: Marijuana (Weed)     Comment: Rare    Sexual activity: Not on file   Other Topics Concern    Not on file   Social History Narrative    Not on file     Social Determinants of Health     Financial Resource Strain: Not on file   Food Insecurity: No Food Insecurity (12/31/2023)    Hunger Vital Sign     Worried About Running Out of Food in the Last Year: Never true     Ran Out of Food in the Last Year: Never true   Transportation Needs: No Transportation Needs (12/31/2023)    PRAPARE - Transportation     Lack of Transportation (Medical): No     Lack of Transportation (Non-Medical): No   Physical Activity: Not on file   Stress: Not on file   Social Connections: Not on file   Intimate Partner Violence: Not on file   Housing Stability: Low Risk  (12/31/2023)    Housing Stability Vital Sign     Unable to Pay for Housing in the Last Year: No     Number of Places Lived in the Last Year: 1     Unstable Housing in the Last Year: No       Current Facility-Administered Medications   Medication Dose Route Frequency Provider Last Rate Last Admin    lidocaine PF 1 % injection 1 mL  1 mL IntraDERmal Once PRN Reyes Davis MD        lactated ringers IV soln infusion   IntraVENous Continuous Reyes Davis  mL/hr at 03/01/24 0721 NoRateChange at 03/01/24 0721    sodium chloride flush 0.9 % injection 5-40 mL  5-40 mL IntraVENous 2 times per day Reyes Davis MD        sodium chloride flush 0.9 % injection 5-40 mL  5-40 mL IntraVENous PRN Reyes Davis MD        0.9 % sodium chloride infusion   IntraVENous PRN Reyes Davis MD           Objective      Physical Exam  /65   Pulse 82   Temp 97 °F (36.1 °C) (Infrared)   Resp 14   Ht 1.626 m (5' 4\")   Wt 74.4 kg (164 lb)   SpO2 98%   BMI 28.15 kg/m²    Constitutional:  Vital signs are normal. The patient

## 2024-03-01 NOTE — DISCHARGE INSTRUCTIONS
POST-ENDOSCOPY INSTRUCTIONS    1. ACTIVITY   No driving, operating machinery, or making important decisions for 24 hours.    Resume normal activity after 24 hours.  You may return to work after 24 hours.    2. DIET    EGD: Resume your usual diet unless specified below.                Diet Modification: Regular    3. MEDICATIONS  (Do not consume alcohol, tranquilizers, or sleeping medications for 24 hours unless advised by your physician)                 Resume your usual medications    4. PHYSICIAN FOLLOW-UP                 Please continue with your previously scheduled appointments                 See your primary care physician as planned.      6. NORMAL CHANGES YOU MAY EXPERIENCE AFTER ENDOSCOPY:      EGD:  Sore throat, some abdominal pain and cramping.            7. CALL YOUR PHYSICIAN IF YOU EXPERIENCE ANY OF THE FOLLOWING      A.  Passing blood rectally or vomiting blood (color may be red or black)      B.  Severe abdominal pain or tenderness (that is not relieved by passing air)      C.  Fever, chills, or excessive sweating      D.  Persistent nausea or vomiting      E.  Redness or swelling at the IV site    If you have additional questions, PLEASE call your doctor or the [facility] center at [phone]

## 2024-03-01 NOTE — OP NOTE
[facility] OR  [address]  [facility] [address]  Dept: [phone]  Loc: [phone]    Preoperative Diagnosis: Gastrojejunal Ulcer    Postoperative Diagnosis: Healed ulcer    Procedure: Esophagogastroduodenoscopy with biopsy    Surgeon: Anjum Merrill,     Findings:   Healed ulcer  No stomal dilation   5 cm pouch  Heaven limb normal for 20 cm  Normal esophagus    EBL: None    Anesthesia: MAC, See Anesthesia Records    Specimen:    Gastric pouch    Operative Narrative:  The risks and benefits of the procedure were explained to the patient in detail, including but not limited to: bleeding, infection, need for further surgery, damage to surrounding structures and perforation.  The patient consented with the procedure.    The patient was taken to the endoscopic suite and placed in lateral position.  Oxygen was administered via nasal cannula and a mouth guard placed.  MAC was administered via the anesthesia team.  The endoscope was then advanced into the oropharynx and passed into the esophagus under direct visualization.  The scope was further advanced under direct visualization into the esophageal lumen and down into the gastric pouch.   The scope was advanced past the anastomosis and the heaven limb surveyed.  The heaven limb appeared patent and without signs of ischemia.  The scope was withdrawn and the blind pouch surveyed, without lesion.  The scope withdrawn and cold forceps biopsy of the pouch performed.  Hemostasis noted. The heaven limb and gastric pouch without trauma or bleeding. The esophagus without lesion    The patient tolerated the procedure well    PPI

## 2024-03-05 LAB — SURGICAL PATHOLOGY REPORT: NORMAL

## 2025-08-28 ENCOUNTER — TELEPHONE (OUTPATIENT)
Dept: BARIATRICS/WEIGHT MGMT | Age: 45
End: 2025-08-28

## (undated) DEVICE — SOLUTION ANTIFOG VIS SYS CLEARIFY LAPSCP

## (undated) DEVICE — DEVICE TRCR 12X9X3IN WHT CLSR DISP OMNICLOSE

## (undated) DEVICE — STRAP ARMBRD W1.5XL32IN FOAM STR YET SFT W/ HK AND LOOP

## (undated) DEVICE — ADHESIVE SKIN CLOSURE TOP 36 CC HI VISC DERMBND MINI

## (undated) DEVICE — SUTURE PERMA-HAND SZ 2-0 L30IN NONABSORBABLE BLK L26MM SH K833H

## (undated) DEVICE — SUTURE VCRL SZ 0 L54IN ABSRB UD POLYGLACTIN 910 COAT BRAID J608H

## (undated) DEVICE — Device

## (undated) DEVICE — STRIP,CLOSURE,WOUND,MEDI-STRIP,1/2X4: Brand: MEDLINE

## (undated) DEVICE — 3M™ IOBAN™ 2 ANTIMICROBIAL INCISE DRAPE 6650EZ: Brand: IOBAN™ 2

## (undated) DEVICE — SUTURE PERMAHAND SZ 0 L30IN NONABSORBABLE BLK L26MM SH 1/2 K834H

## (undated) DEVICE — CANNULA SEAL

## (undated) DEVICE — GOWN,SIRUS,NONRNF,SETINSLV,XL,20/CS: Brand: MEDLINE

## (undated) DEVICE — TROCAR: Brand: KII FIOS FIRST ENTRY

## (undated) DEVICE — DRAIN SURG 19FR 100% SIL RADPQ RND CHN FULL FLUT

## (undated) DEVICE — BITEBLOCK 54FR W/ DENT RIM BLOX

## (undated) DEVICE — AGENT HEMSTAT W2XL14IN OXIDIZED REGENERATED CELOS ABSRB FOR

## (undated) DEVICE — GLOVE ORANGE PI 7   MSG9070

## (undated) DEVICE — TISSUE RETRIEVAL SYSTEM: Brand: INZII RETRIEVAL SYSTEM

## (undated) DEVICE — MEDICINE CUP, GRADUATED, STER: Brand: MEDLINE

## (undated) DEVICE — GLOVE SURG SZ 65 L12IN FNGR THK87MIL WHT LTX FREE

## (undated) DEVICE — TOTAL TRAY, 16FR 10ML SIL FOLEY, URN: Brand: MEDLINE

## (undated) DEVICE — SEAL

## (undated) DEVICE — STAPLER INT W25MM WHT TRNSOR CIR ANVIL W/ ADVANCING PROX

## (undated) DEVICE — Device: Brand: DEFENDO VALVE AND CONNECTOR KIT

## (undated) DEVICE — 3M™ STERI-STRIP™ COMPOUND BENZOIN TINCTURE 40 BAGS/CARTON 4 CARTONS/CASE C1544: Brand: 3M™ STERI-STRIP™

## (undated) DEVICE — SUTURE SZ 0 27IN 5/8 CIR UR-6  TAPER PT VIOLET ABSRB VICRYL J603H

## (undated) DEVICE — HYPODERMIC SAFETY NEEDLE: Brand: MAGELLAN

## (undated) DEVICE — TROCARS: Brand: KII® BALLOON BLUNT TIP SYSTEM

## (undated) DEVICE — ARM DRAPE

## (undated) DEVICE — CHLORAPREP 26ML ORANGE

## (undated) DEVICE — TOWEL,OR,DSP,ST,NATURAL,DLX,4/PK,20PK/CS: Brand: MEDLINE

## (undated) DEVICE — SUTURE ETHBND EXCEL SZ 3-0 L30IN NONABSORBABLE GRN L26MM SH X832H

## (undated) DEVICE — TUBING, SUCTION, 1/4" X 12', STRAIGHT: Brand: MEDLINE

## (undated) DEVICE — DRESSING TRNSPAR W5XL4.5IN FLM SHT SEMIPERMEABLE WIND

## (undated) DEVICE — COVER LT HNDL BLU PLAS

## (undated) DEVICE — ADAPTER TBNG LUER STUB 15 GA INTMED

## (undated) DEVICE — SYRINGE MED 50ML LUERLOCK TIP

## (undated) DEVICE — REDUCER: Brand: ENDOWRIST

## (undated) DEVICE — SUTURE NONABSORBABLE MONOFILAMENT 3-0 PS-1 18 IN BLK ETHILON 1663H

## (undated) DEVICE — LEGGINGS, PAIR, 31X48, STERILE: Brand: MEDLINE

## (undated) DEVICE — SUTURE ABSRB BRAID COAT VLT SH 3-0 27IN VCRL J311H

## (undated) DEVICE — GLOVE ORANGE PI 7 1/2   MSG9075

## (undated) DEVICE — SUTURE MCRYL SZ 4-0 L18IN ABSRB UD L16MM PC-3 3/8 CIR PRIM Y845G

## (undated) DEVICE — VESSEL SEALER EXTEND: Brand: ENDOWRIST

## (undated) DEVICE — SUTURE ETHLN SZ 3-0 L18IN NONABSORBABLE BLK L24MM PS-1 3/8 1663G

## (undated) DEVICE — FORCEP BX MESH TOOTH MIC 2.8 MMX240 CM NDL STRL RADIAL JAW 4

## (undated) DEVICE — 450 ML BOTTLE OF 0.05% CHLORHEXIDINE GLUCONATE IN 99.95% STERILE WATER FOR IRRIGATION, USP AND APPLICATOR.: Brand: IRRISEPT ANTIMICROBIAL WOUND LAVAGE

## (undated) DEVICE — COUNTER NDL 40 COUNT HLD 70 FOAM BLK ADH W/ MAG

## (undated) DEVICE — ADAPTER CLEANING PORPOISE CLEANING

## (undated) DEVICE — GOWN,AURORA,NONREINFORCED,LARGE: Brand: MEDLINE

## (undated) DEVICE — LIQUIBAND RAPID ADHESIVE 36/CS 0.8ML: Brand: MEDLINE

## (undated) DEVICE — 35 ML SYRINGE LUER-LOCK TIP: Brand: MONOJECT

## (undated) DEVICE — APPLICATOR MEDICATED 26 CC SOLUTION HI LT ORNG CHLORAPREP

## (undated) DEVICE — TIP COVER ACCESSORY

## (undated) DEVICE — TROCAR ENDOSCP L100MM DIA12MM BLDELSS OBT RADLUC STBL SL

## (undated) DEVICE — RESERVOIR,SUCTION,100CC,SILICONE: Brand: MEDLINE

## (undated) DEVICE — CO2 CANNULA,SUPERSOFT, ADLT,7'O2,7'CO2: Brand: MEDLINE

## (undated) DEVICE — CLIP INT L POLYMER LOK LIG HEM O LOK

## (undated) DEVICE — PROTECTOR ULN NRV PUR FOAM HK LOOP STRP ANATOMICALLY

## (undated) DEVICE — SOLUTION IV 1000ML 0.9% SOD CHL PH 5 INJ USP VIAFLX PLAS

## (undated) DEVICE — STAPLER 60: Brand: SUREFORM

## (undated) DEVICE — ELECTRO LUBE IS A SINGLE PATIENT USE DEVICE THAT IS INTENDED TO BE USED ON ELECTROSURGICAL ELECTRODES TO REDUCE STICKING.: Brand: KEY SURGICAL ELECTRO LUBE

## (undated) DEVICE — DRESSING TRNSPAR W2XL2.75IN FLM SHT SEMIPERMEABLE WIND

## (undated) DEVICE — STAPLER INT L L25MM DIA5MM GI WHT TI BARIATRIC CIR CUT 2

## (undated) DEVICE — SKIN AFFIX SURG ADHESIVE 72/CS 0.55ML: Brand: MEDLINE

## (undated) DEVICE — GLOVE SURG SZ 6 L12IN FNGR THK87MIL WHT LTX FREE

## (undated) DEVICE — TUBING, SUCTION, 9/32" X 20', STRAIGHT: Brand: MEDLINE INDUSTRIES, INC.

## (undated) DEVICE — STAPLER 60 RELOAD WHITE: Brand: SUREFORM

## (undated) DEVICE — PERRYSBURG ENDO PACK: Brand: MEDLINE INDUSTRIES, INC.

## (undated) DEVICE — INSUFFLATION TUBING SET WITH FILTER, FUNNEL CONNECTOR AND LUER LOCK: Brand: JOSNOE MEDICAL INC

## (undated) DEVICE — FORCEPS BX L240CM JAW DIA22MM ORNG STD CAP W NDL RAD JAW 4

## (undated) DEVICE — SPONGE GZ W3XL3IN 4 PLY RAYON POLY STD NONWOVEN

## (undated) DEVICE — BLADELESS OBTURATOR: Brand: WECK VISTA

## (undated) DEVICE — APPLIER CLP M L L11.4IN DIA10MM ENDOSCP ROT MULT FOR LIG

## (undated) DEVICE — SUTURE MCRYL SZ 4-0 L18IN ABSRB UD L19MM PS-2 3/8 CIR PRIM Y496G

## (undated) DEVICE — HYBRID CO2 TUBING/CAP SET FOR OLYMPUS® SCOPES & UCR: Brand: ERBE

## (undated) DEVICE — GOWN,AURORA,NONRNF,XL,30/CS: Brand: MEDLINE

## (undated) DEVICE — BINDER ABD 3XL H9XL71 82IN E UNISX 3 PNL

## (undated) DEVICE — GAUZE,SPONGE,FLUFF,6"X6.75",STRL,5/TRAY: Brand: MEDLINE

## (undated) DEVICE — GLOVE ORANGE PI 8   MSG9080

## (undated) DEVICE — BASIN EMSIS 700ML GRAPHITE PLAS KID SHP GRAD

## (undated) DEVICE — TROCAR ENDOSCP L100MM DIA5MM BLDELSS STBL SL THRD OPT VW

## (undated) DEVICE — SUCTION IRRIGATOR: Brand: ENDOWRIST

## (undated) DEVICE — GARMENT,MEDLINE,DVT,INT,CALF,LG, GEN2: Brand: MEDLINE

## (undated) DEVICE — DRAIN SURG PENROSE 0.25X18 IN CLOSED WND DRAINAGE PREM SIL

## (undated) DEVICE — DRESSING BORDERED ADH GZ UNIV GEN USE 5IN 4IN AND 2 1/2IN

## (undated) DEVICE — FORCEPS BX L240CM JAW DIA2.8MM L CAP W/ NDL MIC MESH TOOTH

## (undated) DEVICE — SOLUTION IRRIG 1000ML STRL H2O USP PLAS POUR BTL

## (undated) DEVICE — STAPLER 60 RELOAD BLUE: Brand: SUREFORM